# Patient Record
Sex: MALE | Race: WHITE | Employment: OTHER | URBAN - NONMETROPOLITAN AREA
[De-identification: names, ages, dates, MRNs, and addresses within clinical notes are randomized per-mention and may not be internally consistent; named-entity substitution may affect disease eponyms.]

---

## 2021-08-08 ENCOUNTER — HOSPITAL ENCOUNTER (OUTPATIENT)
Age: 81
Setting detail: OBSERVATION
Discharge: HOME | End: 2021-08-10
Attending: EMERGENCY MEDICINE | Admitting: INTERNAL MEDICINE
Payer: MEDICARE

## 2021-08-08 ENCOUNTER — APPOINTMENT (OUTPATIENT)
Dept: GENERAL RADIOLOGY | Age: 81
End: 2021-08-08
Payer: MEDICARE

## 2021-08-08 DIAGNOSIS — R07.9 CHEST PAIN, UNSPECIFIED TYPE: Primary | ICD-10-CM

## 2021-08-08 LAB
ALBUMIN SERPL-MCNC: 4.4 G/DL (ref 3.5–5.2)
ALP BLD-CCNC: 120 U/L (ref 40–130)
ALT SERPL-CCNC: 21 U/L (ref 5–41)
ANION GAP SERPL CALCULATED.3IONS-SCNC: 12 MMOL/L (ref 7–19)
APTT: 26.8 SEC (ref 26–36.2)
AST SERPL-CCNC: 21 U/L (ref 5–40)
BASOPHILS ABSOLUTE: 0 K/UL (ref 0–0.2)
BASOPHILS RELATIVE PERCENT: 0.4 % (ref 0–1)
BILIRUB SERPL-MCNC: 0.5 MG/DL (ref 0.2–1.2)
BUN BLDV-MCNC: 17 MG/DL (ref 8–23)
CALCIUM SERPL-MCNC: 9.8 MG/DL (ref 8.8–10.2)
CHLORIDE BLD-SCNC: 101 MMOL/L (ref 98–111)
CO2: 27 MMOL/L (ref 22–29)
CREAT SERPL-MCNC: 1 MG/DL (ref 0.5–1.2)
D DIMER: <0.27 UG/ML FEU (ref 0–0.48)
EOSINOPHILS ABSOLUTE: 0.1 K/UL (ref 0–0.6)
EOSINOPHILS RELATIVE PERCENT: 1.1 % (ref 0–5)
GFR AFRICAN AMERICAN: >59
GFR NON-AFRICAN AMERICAN: >60
GLUCOSE BLD-MCNC: 120 MG/DL (ref 70–99)
GLUCOSE BLD-MCNC: 147 MG/DL (ref 74–109)
HCT VFR BLD CALC: 38.6 % (ref 42–52)
HEMOGLOBIN: 12.6 G/DL (ref 14–18)
IMMATURE GRANULOCYTES #: 0 K/UL
INR BLD: 1.12 (ref 0.88–1.18)
LYMPHOCYTES ABSOLUTE: 0.4 K/UL (ref 1.1–4.5)
LYMPHOCYTES RELATIVE PERCENT: 7.4 % (ref 20–40)
MCH RBC QN AUTO: 32.1 PG (ref 27–31)
MCHC RBC AUTO-ENTMCNC: 32.6 G/DL (ref 33–37)
MCV RBC AUTO: 98.2 FL (ref 80–94)
MONOCYTES ABSOLUTE: 0.5 K/UL (ref 0–0.9)
MONOCYTES RELATIVE PERCENT: 8.1 % (ref 0–10)
NEUTROPHILS ABSOLUTE: 4.6 K/UL (ref 1.5–7.5)
NEUTROPHILS RELATIVE PERCENT: 82.3 % (ref 50–65)
PDW BLD-RTO: 13.3 % (ref 11.5–14.5)
PERFORMED ON: ABNORMAL
PLATELET # BLD: 135 K/UL (ref 130–400)
PMV BLD AUTO: 10.5 FL (ref 9.4–12.4)
POTASSIUM SERPL-SCNC: 3.7 MMOL/L (ref 3.5–5)
PRO-BNP: 78 PG/ML (ref 0–1800)
PROTHROMBIN TIME: 14.6 SEC (ref 12–14.6)
RBC # BLD: 3.93 M/UL (ref 4.7–6.1)
SARS-COV-2, NAAT: NOT DETECTED
SODIUM BLD-SCNC: 140 MMOL/L (ref 136–145)
TOTAL PROTEIN: 6.7 G/DL (ref 6.6–8.7)
TROPONIN: <0.01 NG/ML (ref 0–0.03)
WBC # BLD: 5.6 K/UL (ref 4.8–10.8)

## 2021-08-08 PROCEDURE — 93005 ELECTROCARDIOGRAM TRACING: CPT | Performed by: EMERGENCY MEDICINE

## 2021-08-08 PROCEDURE — 6370000000 HC RX 637 (ALT 250 FOR IP): Performed by: EMERGENCY MEDICINE

## 2021-08-08 PROCEDURE — G0378 HOSPITAL OBSERVATION PER HR: HCPCS

## 2021-08-08 PROCEDURE — 80053 COMPREHEN METABOLIC PANEL: CPT

## 2021-08-08 PROCEDURE — 83880 ASSAY OF NATRIURETIC PEPTIDE: CPT

## 2021-08-08 PROCEDURE — 82947 ASSAY GLUCOSE BLOOD QUANT: CPT

## 2021-08-08 PROCEDURE — 87635 SARS-COV-2 COVID-19 AMP PRB: CPT

## 2021-08-08 PROCEDURE — 85379 FIBRIN DEGRADATION QUANT: CPT

## 2021-08-08 PROCEDURE — 85610 PROTHROMBIN TIME: CPT

## 2021-08-08 PROCEDURE — 84484 ASSAY OF TROPONIN QUANT: CPT

## 2021-08-08 PROCEDURE — 36415 COLL VENOUS BLD VENIPUNCTURE: CPT

## 2021-08-08 PROCEDURE — 71045 X-RAY EXAM CHEST 1 VIEW: CPT

## 2021-08-08 PROCEDURE — 85730 THROMBOPLASTIN TIME PARTIAL: CPT

## 2021-08-08 PROCEDURE — 85025 COMPLETE CBC W/AUTO DIFF WBC: CPT

## 2021-08-08 PROCEDURE — 99283 EMERGENCY DEPT VISIT LOW MDM: CPT

## 2021-08-08 PROCEDURE — 2580000003 HC RX 258: Performed by: INTERNAL MEDICINE

## 2021-08-08 RX ORDER — ATORVASTATIN CALCIUM 80 MG/1
80 TABLET, FILM COATED ORAL NIGHTLY
Status: DISCONTINUED | OUTPATIENT
Start: 2021-08-08 | End: 2021-08-10 | Stop reason: HOSPADM

## 2021-08-08 RX ORDER — MAGNESIUM SULFATE IN WATER 40 MG/ML
2000 INJECTION, SOLUTION INTRAVENOUS PRN
Status: DISCONTINUED | OUTPATIENT
Start: 2021-08-08 | End: 2021-08-10 | Stop reason: HOSPADM

## 2021-08-08 RX ORDER — ASPIRIN 81 MG/1
81 TABLET ORAL DAILY
Status: DISCONTINUED | OUTPATIENT
Start: 2021-08-09 | End: 2021-08-10 | Stop reason: HOSPADM

## 2021-08-08 RX ORDER — SODIUM CHLORIDE 9 MG/ML
25 INJECTION, SOLUTION INTRAVENOUS PRN
Status: DISCONTINUED | OUTPATIENT
Start: 2021-08-08 | End: 2021-08-10 | Stop reason: HOSPADM

## 2021-08-08 RX ORDER — FUROSEMIDE 20 MG/1
20 TABLET ORAL DAILY
Status: DISCONTINUED | OUTPATIENT
Start: 2021-08-09 | End: 2021-08-10 | Stop reason: HOSPADM

## 2021-08-08 RX ORDER — MORPHINE SULFATE 4 MG/ML
2 INJECTION, SOLUTION INTRAMUSCULAR; INTRAVENOUS EVERY 4 HOURS PRN
Status: DISCONTINUED | OUTPATIENT
Start: 2021-08-08 | End: 2021-08-10 | Stop reason: HOSPADM

## 2021-08-08 RX ORDER — TAMSULOSIN HYDROCHLORIDE 0.4 MG/1
0.4 CAPSULE ORAL DAILY
Status: DISCONTINUED | OUTPATIENT
Start: 2021-08-08 | End: 2021-08-10 | Stop reason: HOSPADM

## 2021-08-08 RX ORDER — DEXTROSE MONOHYDRATE 25 G/50ML
12.5 INJECTION, SOLUTION INTRAVENOUS PRN
Status: DISCONTINUED | OUTPATIENT
Start: 2021-08-08 | End: 2021-08-10 | Stop reason: HOSPADM

## 2021-08-08 RX ORDER — POLYETHYLENE GLYCOL 3350 17 G/17G
17 POWDER, FOR SOLUTION ORAL DAILY PRN
Status: DISCONTINUED | OUTPATIENT
Start: 2021-08-08 | End: 2021-08-10 | Stop reason: HOSPADM

## 2021-08-08 RX ORDER — ACETAMINOPHEN 325 MG/1
650 TABLET ORAL EVERY 6 HOURS PRN
Status: DISCONTINUED | OUTPATIENT
Start: 2021-08-08 | End: 2021-08-10 | Stop reason: HOSPADM

## 2021-08-08 RX ORDER — LEVOTHYROXINE SODIUM 0.07 MG/1
75 TABLET ORAL DAILY
Status: DISCONTINUED | OUTPATIENT
Start: 2021-08-09 | End: 2021-08-10 | Stop reason: HOSPADM

## 2021-08-08 RX ORDER — AMIODARONE HYDROCHLORIDE 200 MG/1
200 TABLET ORAL DAILY
Status: DISCONTINUED | OUTPATIENT
Start: 2021-08-09 | End: 2021-08-10 | Stop reason: HOSPADM

## 2021-08-08 RX ORDER — SODIUM CHLORIDE 0.9 % (FLUSH) 0.9 %
5-40 SYRINGE (ML) INJECTION EVERY 12 HOURS SCHEDULED
Status: DISCONTINUED | OUTPATIENT
Start: 2021-08-08 | End: 2021-08-10 | Stop reason: HOSPADM

## 2021-08-08 RX ORDER — HEPARIN SODIUM 1000 [USP'U]/ML
40 INJECTION, SOLUTION INTRAVENOUS; SUBCUTANEOUS PRN
Status: DISCONTINUED | OUTPATIENT
Start: 2021-08-08 | End: 2021-08-08

## 2021-08-08 RX ORDER — ASPIRIN 325 MG
325 TABLET ORAL ONCE
Status: COMPLETED | OUTPATIENT
Start: 2021-08-08 | End: 2021-08-08

## 2021-08-08 RX ORDER — HEPARIN SODIUM 1000 [USP'U]/ML
80 INJECTION, SOLUTION INTRAVENOUS; SUBCUTANEOUS ONCE
Status: DISCONTINUED | OUTPATIENT
Start: 2021-08-08 | End: 2021-08-08

## 2021-08-08 RX ORDER — ACETAMINOPHEN 650 MG/1
650 SUPPOSITORY RECTAL EVERY 6 HOURS PRN
Status: DISCONTINUED | OUTPATIENT
Start: 2021-08-08 | End: 2021-08-10 | Stop reason: HOSPADM

## 2021-08-08 RX ORDER — POTASSIUM CHLORIDE 7.45 MG/ML
10 INJECTION INTRAVENOUS PRN
Status: DISCONTINUED | OUTPATIENT
Start: 2021-08-08 | End: 2021-08-10 | Stop reason: HOSPADM

## 2021-08-08 RX ORDER — HYDROCHLOROTHIAZIDE 25 MG/1
12.5 TABLET ORAL DAILY
Status: DISCONTINUED | OUTPATIENT
Start: 2021-08-09 | End: 2021-08-10 | Stop reason: HOSPADM

## 2021-08-08 RX ORDER — SODIUM CHLORIDE 0.9 % (FLUSH) 0.9 %
5-40 SYRINGE (ML) INJECTION PRN
Status: DISCONTINUED | OUTPATIENT
Start: 2021-08-08 | End: 2021-08-10 | Stop reason: HOSPADM

## 2021-08-08 RX ORDER — POTASSIUM CHLORIDE 20 MEQ/1
40 TABLET, EXTENDED RELEASE ORAL PRN
Status: DISCONTINUED | OUTPATIENT
Start: 2021-08-08 | End: 2021-08-10 | Stop reason: HOSPADM

## 2021-08-08 RX ORDER — ONDANSETRON 4 MG/1
4 TABLET, ORALLY DISINTEGRATING ORAL EVERY 8 HOURS PRN
Status: DISCONTINUED | OUTPATIENT
Start: 2021-08-08 | End: 2021-08-10 | Stop reason: HOSPADM

## 2021-08-08 RX ORDER — HEPARIN SODIUM 1000 [USP'U]/ML
80 INJECTION, SOLUTION INTRAVENOUS; SUBCUTANEOUS PRN
Status: DISCONTINUED | OUTPATIENT
Start: 2021-08-08 | End: 2021-08-08

## 2021-08-08 RX ORDER — HEPARIN SODIUM 10000 [USP'U]/100ML
5-30 INJECTION, SOLUTION INTRAVENOUS CONTINUOUS
Status: DISCONTINUED | OUTPATIENT
Start: 2021-08-08 | End: 2021-08-08

## 2021-08-08 RX ORDER — AMLODIPINE BESYLATE 10 MG/1
10 TABLET ORAL DAILY
Status: DISCONTINUED | OUTPATIENT
Start: 2021-08-09 | End: 2021-08-10 | Stop reason: HOSPADM

## 2021-08-08 RX ORDER — TERBINAFINE HYDROCHLORIDE 250 MG/1
250 TABLET ORAL DAILY
Status: DISCONTINUED | OUTPATIENT
Start: 2021-08-08 | End: 2021-08-10 | Stop reason: HOSPADM

## 2021-08-08 RX ORDER — NICOTINE POLACRILEX 4 MG
15 LOZENGE BUCCAL PRN
Status: DISCONTINUED | OUTPATIENT
Start: 2021-08-08 | End: 2021-08-10 | Stop reason: HOSPADM

## 2021-08-08 RX ORDER — LOSARTAN POTASSIUM 100 MG/1
100 TABLET ORAL DAILY
Status: DISCONTINUED | OUTPATIENT
Start: 2021-08-09 | End: 2021-08-10 | Stop reason: HOSPADM

## 2021-08-08 RX ORDER — ONDANSETRON 2 MG/ML
4 INJECTION INTRAMUSCULAR; INTRAVENOUS EVERY 6 HOURS PRN
Status: DISCONTINUED | OUTPATIENT
Start: 2021-08-08 | End: 2021-08-10 | Stop reason: HOSPADM

## 2021-08-08 RX ORDER — NITROGLYCERIN 0.4 MG/1
0.4 TABLET SUBLINGUAL EVERY 5 MIN PRN
Status: DISCONTINUED | OUTPATIENT
Start: 2021-08-08 | End: 2021-08-10 | Stop reason: HOSPADM

## 2021-08-08 RX ORDER — DEXTROSE MONOHYDRATE 50 MG/ML
100 INJECTION, SOLUTION INTRAVENOUS PRN
Status: DISCONTINUED | OUTPATIENT
Start: 2021-08-08 | End: 2021-08-10 | Stop reason: HOSPADM

## 2021-08-08 RX ORDER — FLUTICASONE PROPIONATE 50 MCG
2 SPRAY, SUSPENSION (ML) NASAL 2 TIMES DAILY PRN
Status: DISCONTINUED | OUTPATIENT
Start: 2021-08-08 | End: 2021-08-10 | Stop reason: HOSPADM

## 2021-08-08 RX ADMIN — ASPIRIN 325 MG: 325 TABLET ORAL at 13:53

## 2021-08-08 RX ADMIN — SODIUM CHLORIDE, PRESERVATIVE FREE 10 ML: 5 INJECTION INTRAVENOUS at 20:41

## 2021-08-08 ASSESSMENT — ENCOUNTER SYMPTOMS
BACK PAIN: 0
SORE THROAT: 0
VOMITING: 0
SHORTNESS OF BREATH: 0
NAUSEA: 0
DIARRHEA: 0
RHINORRHEA: 0
ABDOMINAL PAIN: 0

## 2021-08-08 ASSESSMENT — HEART SCORE: ECG: 0

## 2021-08-08 NOTE — ED NOTES
Bed: 06  Expected date:   Expected time:   Means of arrival:   Comments:  [id number] Erika Carrera RN  08/08/21 [id number]

## 2021-08-08 NOTE — ED TRIAGE NOTES
Patient was in [employer], said he stood up and had to sit back down because of chest pain.   \"I drank me a diet soda on the way over and the pain is gone\"

## 2021-08-08 NOTE — ED PROVIDER NOTES
[facility]  eMERGENCY dEPARTMENT eNCOUnter      Pt Name: Phoebe Jessica  MRN: [id number]  [place] 1940  Date of evaluation: 8/8/2021  Provider: Isamar Melvin MD    [address] [place] [address]       Chief Complaint   Patient presents with    Chest Pain         HISTORY OF PRESENT ILLNESS   (Location/Symptom, Timing/Onset,Context/Setting, Quality, Duration, Modifying Factors, Severity)  Note limiting factors. Phoebe Jessica is a 80 y.o. male who presents to the emergency department with a \"hard\" pain on the right and center part of his chest.  It lasted about an hour. It resolved on the way here. Patient has a history of hypertension hyperlipidemia and diabetes. He has a history of atrial fibrillation. He is on Eliquis. His prior cardiac management was in [place]. He just moved here recently. He said he had a stress test 3 months ago and believes it was negative. He had a recent cardioversion for atrial fibrillation. Currently chest pain-free. No history of blood clots. No calf pain or leg swelling. HPI    NursingNotes were reviewed. REVIEW OF SYSTEMS    (2-9 systems for level 4, 10 or more for level 5)     Review of Systems   Constitutional: Negative for chills and fever. HENT: Negative for rhinorrhea and sore throat. Respiratory: Negative for shortness of breath. Cardiovascular: Positive for chest pain. Negative for leg swelling. Gastrointestinal: Negative for abdominal pain, diarrhea, nausea and vomiting. Genitourinary: Negative for difficulty urinating. Musculoskeletal: Negative for back pain and neck pain. Skin: Negative for rash. Neurological: Negative for weakness and headaches. Psychiatric/Behavioral: Negative for confusion. A complete review of systems was performed and is negative except as noted above in the HPI.        PAST MEDICAL HISTORY     Past Medical History:   Diagnosis Date    Agent orange exposure     [facility]     [facility]     Hyperlipidemia     Hypertension     Non [facility]'s [facility]     Non-Hodgkin lymphoma ([facility]          SURGICAL HISTORY       Past Surgical History:   Procedure Laterality Date    ANKLE SURGERY      CARDIAC CATHETERIZATION      JOINT REPLACEMENT      SHOULDER ARTHROPLASTY           CURRENT MEDICATIONS       There are no discharge medications for this patient. ALLERGIES     Patient has no known allergies. FAMILY HISTORY     History reviewed. No pertinent family history. SOCIAL HISTORY       Social History     Socioeconomic History    Marital status:      Spouse name: None    Number of children: None    Years of education: None    Highest education level: None   Occupational History    None   Tobacco Use    Smoking status: Former Smoker     Quit date: 1976     Years since quittin.4    Smokeless tobacco: Never Used   Vaping Use    Vaping Use: Never used   Substance and Sexual Activity    Alcohol use: Not Currently    Drug use: Never    Sexual activity: None   Other Topics Concern    None   Social History Narrative    None     Social Determinants of Health     Financial Resource Strain:     Difficulty of Paying Living Expenses:    Food Insecurity:     Worried About Running Out of Food in the Last Year:     [address] in the Last Year:    Transportation Needs:     Lack of Transportation (Medical):      Lack of Transportation (Non-Medical):    Physical Activity:     Days of Exercise per Week:     Minutes of Exercise per Session:    Stress:     Feeling of Stress :    Social Connections:     Frequency of Communication with Friends and Family:     Frequency of Social Gatherings with Friends and Family:     Attends Religion Services:     Active Member of Clubs or Organizations:     Attends Club or Organization Meetings:     Marital Status:    Intimate Partner Violence:     Fear of Current or Ex-Partner:     Emotionally Abused:     Physically Abused:     Sexually Abused:        SCREENINGS    [place] Coma Scale  Eye Opening: Spontaneous  Best Verbal Response: Oriented  Best Motor Response: Obeys commands  [place] Coma Scale Score: 15 Heart Score for chest pain patients  History: Slightly Suspicious  ECG: Normal  Patient Age: > 65 years  *Risk factors for Atherosclerotic disease: Diabetes Mellitus, Hypercholesterolemia, Hypertension  Risk Factors: > 3 Risk factors or history of atherosclerotic disease*  Troponin: < 1X normal limit  Heart Score Total: 4      PHYSICAL EXAM    (up to 7 for level 4, 8 or more for level 5)     ED Triage Vitals [08/08/21 1222]   BP Temp Temp Source Pulse Resp SpO2 Height Weight   (!) 152/60 98.1 °F (36.7 °C) Temporal 53 18 -- 6' 1\" (1.854 m) 200 lb (90.7 kg)       Physical Exam  Vitals and nursing note reviewed. Constitutional:       General: He is not in acute distress. Appearance: He is well-developed. He is not diaphoretic. HENT:      Head: Normocephalic and atraumatic. Eyes:      Pupils: Pupils are equal, round, and reactive to light. Cardiovascular:      Rate and Rhythm: Normal rate and regular rhythm. Heart sounds: Normal heart sounds. Pulmonary:      Effort: Pulmonary effort is normal. No respiratory distress. Breath sounds: Normal breath sounds. Abdominal:      General: Bowel sounds are normal. There is no distension. Palpations: Abdomen is soft. Tenderness: There is no abdominal tenderness. Musculoskeletal:         General: Normal range of motion. Cervical back: Normal range of motion and neck supple. Skin:     General: Skin is warm and dry. Findings: No rash. Neurological:      Mental Status: He is alert and oriented to person, place, and time. Cranial Nerves: No cranial nerve deficit. Motor: No abnormal muscle tone. Coordination: Coordination normal.   Psychiatric:         Behavior: Behavior normal.         DIAGNOSTIC RESULTS     EKG:  All EKG's are interpreted by the Emergency Department Physician who either signs or Co-signs this chart in the absence of a cardiologist.    Normal sinus rhythm rate 50 nonspecific ST waves  Sinus rhythm rate 52 nonspecific ST waves    RADIOLOGY:   Non-plain film images such as CT, Ultrasound and MRI are read by the radiologist. Plainradiographic images are visualized and preliminarily interpreted by the emergency physician with the below findings:        Interpretation per the Radiologist below, if available at the time of this note:    XR CHEST PORTABLE   Final Result   1. No radiographic evidence of acute cardiopulmonary process. Signed by Dr Seble Mcknight            ED BEDSIDE ULTRASOUND:   Performed by ED Physician - none    LABS:  Labs Reviewed   CBC WITH AUTO DIFFERENTIAL - Abnormal; Notable for the following components:       Result Value    RBC 3.93 (*)     Hemoglobin 12.6 (*)     Hematocrit 38.6 (*)     MCV 98.2 (*)     MCH 32.1 (*)     MCHC 32.6 (*)     Neutrophils % 82.3 (*)     Lymphocytes % 7.4 (*)     Lymphocytes Absolute 0.4 (*)     All other components within normal limits   COMPREHENSIVE METABOLIC PANEL - Abnormal; Notable for the following components:    Glucose 147 (*)     All other components within normal limits   COVID-19, RAPID   APTT   BRAIN NATRIURETIC PEPTIDE   D-DIMER, QUANTITATIVE   PROTIME-INR   TROPONIN   TROPONIN   TROPONIN   TROPONIN   CBC WITH AUTO DIFFERENTIAL   COMPREHENSIVE METABOLIC PANEL   MAGNESIUM   HEMOGLOBIN A1C   LIPID PANEL       All other labs were within normal range or not returned as of this dictation.     EMERGENCY DEPARTMENT COURSE and DIFFERENTIALDIAGNOSIS/MDM:   Vitals:    Vitals:    08/08/21 1222 08/08/21 1345 08/08/21 1543 08/08/21 1632   BP: (!) 152/60  (!) 167/61 (!) 159/61   Pulse: 53  53 54   Resp: 18  19 16   Temp: 98.1 °F (36.7 °C)      TempSrc: Temporal      SpO2:  97% 96% 94%   Weight: 200 lb (90.7 kg)      Height: 6' 1\" (1.854 m)          MDM  No access to prior stress test. Heart score 4. Pt does not want to go home    CONSULTS:  [address]:  Unless otherwise notedbelow, none     Procedures    FINAL IMPRESSION     1. Chest pain, unspecified type          DISPOSITION/PLAN   DISPOSITION        PATIENT REFERRED TO:  @FUP@    DISCHARGE MEDICATIONS:  There are no discharge medications for this patient.          (Please note that portions of this note were completed with a voice recognition program.  Efforts were made to edit the dictations butoccasionally words are mis-transcribed.)    Cierra Owens MD (electronically signed)  AttendingEmergency Physician         Cierra Owens MD  08/08/21 3408

## 2021-08-08 NOTE — H&P
Priya Trujillo - History & Physical    06/06  PCP: No primary care provider on file. Date of Admission: 8/8/2021   Date of Service: Pt seen/examined on8/8/2021 and Placed in Observation. Chief Complaint: Chest pain    History Of Present Illness: The patient is a 80 y.o. male with a past medical history of atrial fibrillation on amiodarone/Eliquis, hypertension, hypothyroidism, BPH, diabetes, hyperlipidemia, non-Hodgkin's lymphoma, chronic sinusitis who presented to the ED complaining of midsternal pressure-like constant nonradiating chest pain not associated with dyspnea while he was at [employer] this morning that went away while he was on his way to the hospital.  Patient appears comfortable during my evaluation is currently eating a sandwich. Denies nausea, vomiting, diarrhea, constipation, fevers, chills, sweats, dysuria, cough, sick contacts. He is recently moved from [place] to this area. Past Medical History:        Diagnosis Date    Agent orange exposure     Atrial fibrillation ([facility]     Diabetes mellitus ([facility]     Hyperlipidemia     Hypertension     Non Hodgkin's lymphoma ([facility]        Past Surgical History:        Procedure Laterality Date    ANKLE SURGERY      CARDIAC CATHETERIZATION      JOINT REPLACEMENT      SHOULDER ARTHROPLASTY         Home Medications:  Tamsulosin  Hydrochlorothiazide  Synthroid  Terbinafine  Olmesartan  Amlodipine  Lipitor  Amiodarone  Lasix  Eliquis  Fluticasone  Glipizide    Allergies:    Patient has no known allergies. Social History:    Tobacco:   reports that he has quit smoking. He has never used smokeless tobacco.  Alcohol:   reports previous alcohol use. Illicit Drugs: denies    Family History:  History reviewed. No pertinent family history. Review of Systems:   Negative except as noted above.         Physical Examination:  BP (!) 159/61   Pulse 54   Temp 98.1 °F (36.7 °C) (Temporal)   Resp 16   Ht 6' 1\" (1.854 m) Wt 200 lb (90.7 kg)   SpO2 94%   BMI 26.39 kg/m²      General appearance: Alert  Head: NC/AT  Eyes: conjunctivae/corneas clear. Ears: normal external ears  Neck: supple  Lungs: BLAE, bibasilar crackles   Heart: regular rate and rhythm, no murmurs appreciated  Abdomen: BS+, soft, NT, ND  Extremities: no edema, pulses+  Skin: warm  Neurologic: Alert, gross motor function intact  Psychiatric:  calm        Diagnostic Data:     CBC:  Recent Labs     08/08/21  1325   WBC 5.6   HGB 12.6*   HCT 38.6*        BMP:  Recent Labs     08/08/21  1325      K 3.7      CO2 27   BUN 17   CREATININE 1.0   CALCIUM 9.8     Recent Labs     08/08/21  1325   AST 21   ALT 21   BILITOT 0.5   ALKPHOS 120     Coag Panel:   Recent Labs     08/08/21  1325   INR 1.12   PROTIME 14.6   APTT 26.8     Cardiac Enzymes:   Recent Labs     08/08/21  1325 08/08/21  1550   TROPONINI <0.01 <0.01     ABGs:No results found for: PHART, PO2ART, ZIU8YOQ  Urinalysis:No results found for: Justyn Roulette, BACTERIA, RBCUA, BLOODU, SPECGRAV, GLUCOSEU  RAD:     XR CHEST PORTABLE [[id number]] Resulted: 08/08/21 1410      Order Status: Completed Updated: 08/08/21 1412     Narrative:       EXAMINATION: XR CHEST PORTABLE 8/8/2021 3:09 PM   HISTORY: XR CHEST PORTABLE 8/8/2021 1:51 PM   HISTORY: Chest pain   COMPARISON: None. FINDINGS:   The lungs are clear. The cardiomediastinal silhouette and pulmonary   vascularity are within normal limits. Right shoulder prosthesis is present. Right central venous Port-A-Cath   is present. .     Impression:       1. No radiographic evidence of acute cardiopulmonary process.    Signed by Dr Korin Hartman Problems    Diagnosis Date Noted    Chest pain [R07.9] 08/08/2021    Agent orange exposure [Z77.098]     Atrial fibrillation (HCC) [I48.91]     Diabetes mellitus ([facility] [E11.9]     Hyperlipidemia [E78.5]     Hypertension [I10]     Non Hodgkin's lymphoma ([facility] [C85.90]     BPH (benign prostatic hyperplasia) [N40.0]     Hypothyroidism [E03.9]     Chronic sinusitis [J32.9]        MPRESSION / PLAN:  Principal Problem:    Chest pain  Active Problems:    Agent orange exposure    Atrial fibrillation (HCC)    Diabetes mellitus ([facility]    Hyperlipidemia    Hypertension    Non Hodgkin's lymphoma (HCC)    BPH (benign prostatic hyperplasia)    Hypothyroidism    Chronic sinusitis  Resolved Problems:    * No resolved hospital problems. *    Chest pain: Loaded with aspirin in the ED. Aspirin/statin. Serial troponin.  TTE. Telemetry. Cardiology evaluation. As needed pain management. Hypertension: Home medications resumed. Monitor BP and adjust medications as needed. Diabetes: HbA1c.  ISS. Monitor BG and adjust medications as needed. Hypothyroidism: Synthroid. Atrial fibrillation: RC/AC.    BPH: Tamsulosin. Non-Hodgkin's lymphoma: Requesting referral for outpatient hematology/oncology. Supportive management. DVT prophylaxis: Eliquis  Full code.       Srini Mena MD  8/8/2021

## 2021-08-09 ENCOUNTER — APPOINTMENT (OUTPATIENT)
Dept: NUCLEAR MEDICINE | Age: 81
End: 2021-08-09
Payer: MEDICARE

## 2021-08-09 LAB
ALBUMIN SERPL-MCNC: 4.2 G/DL (ref 3.5–5.2)
ALP BLD-CCNC: 111 U/L (ref 40–130)
ALT SERPL-CCNC: 18 U/L (ref 5–41)
ANION GAP SERPL CALCULATED.3IONS-SCNC: 10 MMOL/L (ref 7–19)
AST SERPL-CCNC: 16 U/L (ref 5–40)
BASOPHILS ABSOLUTE: 0 K/UL (ref 0–0.2)
BASOPHILS RELATIVE PERCENT: 0.7 % (ref 0–1)
BILIRUB SERPL-MCNC: 0.3 MG/DL (ref 0.2–1.2)
BUN BLDV-MCNC: 16 MG/DL (ref 8–23)
CALCIUM SERPL-MCNC: 10.1 MG/DL (ref 8.8–10.2)
CHLORIDE BLD-SCNC: 103 MMOL/L (ref 98–111)
CHOLESTEROL, TOTAL: 129 MG/DL (ref 160–199)
CO2: 30 MMOL/L (ref 22–29)
CREAT SERPL-MCNC: 1.1 MG/DL (ref 0.5–1.2)
EKG P AXIS: -3 DEGREES
EKG P AXIS: 43 DEGREES
EKG P-R INTERVAL: 208 MS
EKG P-R INTERVAL: 230 MS
EKG Q-T INTERVAL: 480 MS
EKG Q-T INTERVAL: 526 MS
EKG QRS DURATION: 102 MS
EKG QRS DURATION: 108 MS
EKG QTC CALCULATION (BAZETT): 466 MS
EKG QTC CALCULATION (BAZETT): 509 MS
EKG T AXIS: 29 DEGREES
EKG T AXIS: 37 DEGREES
EOSINOPHILS ABSOLUTE: 0.2 K/UL (ref 0–0.6)
EOSINOPHILS RELATIVE PERCENT: 3.4 % (ref 0–5)
GFR AFRICAN AMERICAN: >59
GFR NON-AFRICAN AMERICAN: >60
GLUCOSE BLD-MCNC: 105 MG/DL (ref 74–109)
GLUCOSE BLD-MCNC: 107 MG/DL (ref 70–99)
GLUCOSE BLD-MCNC: 116 MG/DL (ref 70–99)
GLUCOSE BLD-MCNC: 189 MG/DL (ref 70–99)
GLUCOSE BLD-MCNC: 201 MG/DL (ref 70–99)
HBA1C MFR BLD: 5.5 % (ref 4–6)
HCT VFR BLD CALC: 33.9 % (ref 42–52)
HDLC SERPL-MCNC: 48 MG/DL (ref 55–121)
HEMOGLOBIN: 11.5 G/DL (ref 14–18)
IMMATURE GRANULOCYTES #: 0.1 K/UL
LDL CHOLESTEROL CALCULATED: 64 MG/DL
LV EF: 54 %
LV EF: 60 %
LVEF MODALITY: NORMAL
LVEF MODALITY: NORMAL
LYMPHOCYTES ABSOLUTE: 0.8 K/UL (ref 1.1–4.5)
LYMPHOCYTES RELATIVE PERCENT: 17.8 % (ref 20–40)
MAGNESIUM: 2.3 MG/DL (ref 1.6–2.4)
MCH RBC QN AUTO: 32.4 PG (ref 27–31)
MCHC RBC AUTO-ENTMCNC: 33.9 G/DL (ref 33–37)
MCV RBC AUTO: 95.5 FL (ref 80–94)
MONOCYTES ABSOLUTE: 0.5 K/UL (ref 0–0.9)
MONOCYTES RELATIVE PERCENT: 11 % (ref 0–10)
NEUTROPHILS ABSOLUTE: 2.9 K/UL (ref 1.5–7.5)
NEUTROPHILS RELATIVE PERCENT: 65 % (ref 50–65)
PDW BLD-RTO: 13.2 % (ref 11.5–14.5)
PERFORMED ON: ABNORMAL
PLATELET # BLD: 119 K/UL (ref 130–400)
PMV BLD AUTO: 10.4 FL (ref 9.4–12.4)
POTASSIUM SERPL-SCNC: 3.7 MMOL/L (ref 3.5–5)
RBC # BLD: 3.55 M/UL (ref 4.7–6.1)
SODIUM BLD-SCNC: 143 MMOL/L (ref 136–145)
TOTAL PROTEIN: 5.7 G/DL (ref 6.6–8.7)
TRIGL SERPL-MCNC: 84 MG/DL (ref 0–149)
TROPONIN: <0.01 NG/ML (ref 0–0.03)
WBC # BLD: 4.4 K/UL (ref 4.8–10.8)

## 2021-08-09 PROCEDURE — 6370000000 HC RX 637 (ALT 250 FOR IP): Performed by: INTERNAL MEDICINE

## 2021-08-09 PROCEDURE — 6360000002 HC RX W HCPCS: Performed by: INTERNAL MEDICINE

## 2021-08-09 PROCEDURE — 80053 COMPREHEN METABOLIC PANEL: CPT

## 2021-08-09 PROCEDURE — 85025 COMPLETE CBC W/AUTO DIFF WBC: CPT

## 2021-08-09 PROCEDURE — 82947 ASSAY GLUCOSE BLOOD QUANT: CPT

## 2021-08-09 PROCEDURE — 36415 COLL VENOUS BLD VENIPUNCTURE: CPT

## 2021-08-09 PROCEDURE — 83735 ASSAY OF MAGNESIUM: CPT

## 2021-08-09 PROCEDURE — 93306 TTE W/DOPPLER COMPLETE: CPT

## 2021-08-09 PROCEDURE — 99204 OFFICE O/P NEW MOD 45 MIN: CPT | Performed by: INTERNAL MEDICINE

## 2021-08-09 PROCEDURE — G0378 HOSPITAL OBSERVATION PER HR: HCPCS

## 2021-08-09 PROCEDURE — A9500 TC99M SESTAMIBI: HCPCS | Performed by: INTERNAL MEDICINE

## 2021-08-09 PROCEDURE — 84484 ASSAY OF TROPONIN QUANT: CPT

## 2021-08-09 PROCEDURE — 93017 CV STRESS TEST TRACING ONLY: CPT

## 2021-08-09 PROCEDURE — 80061 LIPID PANEL: CPT

## 2021-08-09 PROCEDURE — 83036 HEMOGLOBIN GLYCOSYLATED A1C: CPT

## 2021-08-09 PROCEDURE — 2580000003 HC RX 258: Performed by: INTERNAL MEDICINE

## 2021-08-09 PROCEDURE — 3430000000 HC RX DIAGNOSTIC RADIOPHARMACEUTICAL: Performed by: INTERNAL MEDICINE

## 2021-08-09 RX ORDER — ATORVASTATIN CALCIUM 80 MG/1
80 TABLET, FILM COATED ORAL NIGHTLY
COMMUNITY
End: 2022-02-16 | Stop reason: SDUPTHER

## 2021-08-09 RX ORDER — TERBINAFINE HYDROCHLORIDE 250 MG/1
250 TABLET ORAL DAILY
COMMUNITY

## 2021-08-09 RX ORDER — FLUTICASONE PROPIONATE 50 MCG
1 SPRAY, SUSPENSION (ML) NASAL DAILY PRN
COMMUNITY

## 2021-08-09 RX ORDER — HYDROCHLOROTHIAZIDE 12.5 MG/1
12.5 TABLET ORAL DAILY
COMMUNITY

## 2021-08-09 RX ORDER — AMLODIPINE BESYLATE 10 MG/1
10 TABLET ORAL DAILY
COMMUNITY
End: 2022-02-16 | Stop reason: SDUPTHER

## 2021-08-09 RX ORDER — MULTIVIT-MIN/IRON/FOLIC ACID/K 18-600-40
2 CAPSULE ORAL NIGHTLY
COMMUNITY

## 2021-08-09 RX ORDER — GLIPIZIDE 2.5 MG/1
2.5 TABLET, EXTENDED RELEASE ORAL DAILY
COMMUNITY
End: 2021-12-27

## 2021-08-09 RX ORDER — LEVOTHYROXINE SODIUM 0.07 MG/1
75 TABLET ORAL DAILY
COMMUNITY
End: 2021-12-27

## 2021-08-09 RX ORDER — MULTIVIT-MIN/IRON/FOLIC ACID/K 18-600-40
CAPSULE ORAL NIGHTLY
COMMUNITY

## 2021-08-09 RX ORDER — ASCORBIC ACID 500 MG
2000 TABLET ORAL DAILY
COMMUNITY

## 2021-08-09 RX ORDER — AMIODARONE HYDROCHLORIDE 200 MG/1
200 TABLET ORAL DAILY
COMMUNITY
End: 2022-02-16 | Stop reason: SDUPTHER

## 2021-08-09 RX ORDER — TAMSULOSIN HYDROCHLORIDE 0.4 MG/1
0.4 CAPSULE ORAL NIGHTLY
COMMUNITY

## 2021-08-09 RX ORDER — FUROSEMIDE 20 MG/1
20 TABLET ORAL DAILY
COMMUNITY
End: 2021-12-27

## 2021-08-09 RX ORDER — OLMESARTAN MEDOXOMIL 40 MG/1
40 TABLET ORAL DAILY
COMMUNITY
End: 2022-02-16 | Stop reason: SDUPTHER

## 2021-08-09 RX ADMIN — SODIUM CHLORIDE, PRESERVATIVE FREE 10 ML: 5 INJECTION INTRAVENOUS at 21:48

## 2021-08-09 RX ADMIN — FUROSEMIDE 20 MG: 20 TABLET ORAL at 11:02

## 2021-08-09 RX ADMIN — TAMSULOSIN HYDROCHLORIDE 0.4 MG: 0.4 CAPSULE ORAL at 11:03

## 2021-08-09 RX ADMIN — TETRAKIS(2-METHOXYISOBUTYLISOCYANIDE)COPPER(I) TETRAFLUOROBORATE 30 MILLICURIE: 1 INJECTION, POWDER, LYOPHILIZED, FOR SOLUTION INTRAVENOUS at 15:14

## 2021-08-09 RX ADMIN — TERBINAFINE 250 MG: 250 TABLET ORAL at 11:02

## 2021-08-09 RX ADMIN — AMLODIPINE BESYLATE 10 MG: 10 TABLET ORAL at 11:01

## 2021-08-09 RX ADMIN — TETRAKIS(2-METHOXYISOBUTYLISOCYANIDE)COPPER(I) TETRAFLUOROBORATE 10 MILLICURIE: 1 INJECTION, POWDER, LYOPHILIZED, FOR SOLUTION INTRAVENOUS at 15:14

## 2021-08-09 RX ADMIN — ASPIRIN 81 MG: 81 TABLET, COATED ORAL at 11:02

## 2021-08-09 RX ADMIN — APIXABAN 5 MG: 5 TABLET, FILM COATED ORAL at 21:48

## 2021-08-09 RX ADMIN — ATORVASTATIN CALCIUM 80 MG: 80 TABLET, FILM COATED ORAL at 21:48

## 2021-08-09 RX ADMIN — REGADENOSON 0.4 MG: 0.08 INJECTION, SOLUTION INTRAVENOUS at 15:10

## 2021-08-09 RX ADMIN — APIXABAN 5 MG: 5 TABLET, FILM COATED ORAL at 11:03

## 2021-08-09 RX ADMIN — LEVOTHYROXINE SODIUM 75 MCG: 75 TABLET ORAL at 11:01

## 2021-08-09 RX ADMIN — HYDROCHLOROTHIAZIDE 12.5 MG: 25 TABLET ORAL at 11:02

## 2021-08-09 RX ADMIN — LOSARTAN POTASSIUM 100 MG: 100 TABLET, FILM COATED ORAL at 11:01

## 2021-08-09 RX ADMIN — SODIUM CHLORIDE, PRESERVATIVE FREE 10 ML: 5 INJECTION INTRAVENOUS at 11:04

## 2021-08-09 RX ADMIN — AMIODARONE HYDROCHLORIDE 200 MG: 200 TABLET ORAL at 11:02

## 2021-08-09 ASSESSMENT — PAIN SCALES - GENERAL: PAINLEVEL_OUTOF10: 0

## 2021-08-09 ASSESSMENT — ENCOUNTER SYMPTOMS
GASTROINTESTINAL NEGATIVE: 1
RESPIRATORY NEGATIVE: 1

## 2021-08-09 NOTE — PROGRESS NOTES
[facility]        Hospitalist Progress Note  8/9/2021 5:02 PM  Subjective:   Admit Date: 8/8/2021  PCP: No primary care provider on file. Chief Complaint: Chest pain    Subjective: Patient seen and examined status post Lexiscan stress test.  Denies current chest pain or shortness of breath. Appears well. [facility] History: 80 y.o. male with a past medical history of atrial fibrillation on amiodarone/Eliquis, hypertension, hypothyroidism, BPH, diabetes, hyperlipidemia, non-Hodgkin's lymphoma, chronic sinusitis who presented to the ED complaining of chest pain. Cardiology consulted admission. ROS: 14 point review of systems is negative except as specifically addressed above. ADULT DIET;  Regular; 4 carb choices (60 gm/meal)    Intake/Output Summary (Last 24 hours) at 8/9/2021 1702  Last data filed at 8/9/2021 1101  Gross per 24 hour   Intake 250 ml   Output --   Net 250 ml     Medications:   sodium chloride      dextrose       Current Facility-Administered Medications   Medication Dose Route Frequency Provider Last Rate Last Admin    aspirin EC tablet 81 mg  81 mg Oral Daily Derinda Leyden, MD   81 mg at 08/09/21 1102    sodium chloride flush 0.9 % injection 5-40 mL  5-40 mL Intravenous 2 times per day Derinda Leyden, MD   10 mL at 08/09/21 1104    sodium chloride flush 0.9 % injection 5-40 mL  5-40 mL Intravenous PRN Derinda Leyden, MD        0.9 % sodium chloride infusion  25 mL Intravenous PRN Derinda Leyden, MD        ondansetron (ZOFRAN-ODT) disintegrating tablet 4 mg  4 mg Oral Q8H PRN Derinda Leyden, MD        Or    ondansetron [facility]) injection 4 mg  4 mg Intravenous Q6H PRN Derinda Leyden, MD        polyethylene glycol [facility]) packet 17 g  17 g Oral Daily PRN Derinda Leyden, MD        acetaminophen (TYLENOL) tablet 650 mg  650 mg Oral Q6H PRN Derinda Leyden, MD        Or    acetaminophen (TYLENOL) suppository 650 mg  650 mg Rectal Q6H PRN Derinda Leyden, MD        potassium chloride (KLOR-CON M) extended release tablet 40 mEq  40 mEq Oral PRN Janet Small MD        Or    potassium bicarb-citric acid (EFFER-K) effervescent tablet 40 mEq  40 mEq Oral PRN Janet Small MD        Or    potassium chloride 10 mEq/100 mL IVPB (Peripheral Line)  10 mEq Intravenous PRN Janet Small MD        magnesium sulfate 2000 mg in 50 mL IVPB premix  2,000 mg Intravenous PRN Janet Small MD        morphine injection 2 mg  2 mg Intravenous Q4H PRN Janet Small MD        nitroGLYCERIN (NITROSTAT) SL tablet 0.4 mg  0.4 mg Sublingual Q5 Min PRGUILLERMO Small MD        tamsulosin [facility]) capsule 0.4 mg  0.4 mg Oral Daily Janet Small MD   0.4 mg at 08/09/21 1103    levothyroxine (SYNTHROID) tablet 75 mcg  75 mcg Oral Daily Janet Small MD   75 mcg at 08/09/21 1101    terbinafine (LAMISIL) tablet 250 mg  250 mg Oral Daily Janet Small MD   250 mg at 08/09/21 1102    insulin lispro (HUMALOG) injection vial 0-6 Units  0-6 Units Subcutaneous TID WC Janet Small MD        insulin lispro (HUMALOG) injection vial 0-3 Units  0-3 Units Subcutaneous Nightly Janet Small MD        glucose (GLUTOSE) 40 % oral gel 15 g  15 g Oral PRN Janet Small MD        dextrose 50 % IV solution  12.5 g Intravenous PRN Janet Small MD        glucagon (rDNA) injection 1 mg  1 mg Intramuscular PRN Janet Small MD        dextrose 5 % solution  100 mL/hr Intravenous PRN Janet Small MD        hydroCHLOROthiazide (HYDRODIURIL) tablet 12.5 mg  12.5 mg Oral Daily Janet Small MD   12.5 mg at 08/09/21 1102    losartan (COZAAR) tablet 100 mg  100 mg Oral Daily Janet Small MD   100 mg at 08/09/21 1101    amLODIPine (NORVASC) tablet 10 mg  10 mg Oral Daily Janet Small MD   10 mg at 08/09/21 1101    atorvastatin (LIPITOR) tablet 80 mg  80 mg Oral Nightly Janet Small MD        amiodarone (CORDARONE) tablet 200 mg  200 mg Oral Daily Janet Small MD   200 mg at 08/09/21 1102    furosemide (LASIX) tablet 20 mg 20 mg Oral Daily Srini Mena MD   20 mg at 08/09/21 1102    apixaban (ELIQUIS) tablet 5 mg  5 mg Oral BID Srini Mena MD   5 mg at 08/09/21 1103    fluticasone (FLONASE) 50 MCG/ACT nasal spray 2 spray  2 spray Each Nostril BID PRN Srini Mena MD            Labs:     Recent Labs     08/08/21  1325 08/09/21  0004   WBC 5.6 4.4*   RBC 3.93* 3.55*   HGB 12.6* 11.5*   HCT 38.6* 33.9*   MCV 98.2* 95.5*   MCH 32.1* 32.4*   MCHC 32.6* 33.9    119*     Recent Labs     08/08/21  1325 08/09/21  0004    143   K 3.7 3.7   ANIONGAP 12 10    103   CO2 27 30*   BUN 17 16   CREATININE 1.0 1.1   GLUCOSE 147* 105   CALCIUM 9.8 10.1     Recent Labs     08/09/21  0004   MG 2.3     Recent Labs     08/08/21  1325 08/09/21  0004   AST 21 16   ALT 21 18   BILITOT 0.5 0.3   ALKPHOS 120 111     ABGs:No results for input(s): PH, PO2, PCO2, HCO3, BE, O2SAT in the last 72 hours. Troponin T:   Recent Labs     08/08/21  1550 08/08/21 2013 08/09/21  0005   TROPONINI <0.01 <0.01 <0.01     INR:   Recent Labs     08/08/21  1325   INR 1.12     Lactic Acid: No results for input(s): LACTA in the last 72 hours. Objective:   Vitals: BP (!) 113/56   Pulse 52   Temp 96.8 °F (36 °C) (Temporal)   Resp 18   Ht 6' 1\" (1.854 m)   Wt 200 lb (90.7 kg)   SpO2 97%   BMI 26.39 kg/m²   24HR INTAKE/OUTPUT:      Intake/Output Summary (Last 24 hours) at 8/9/2021 1702  Last data filed at 8/9/2021 1101  Gross per 24 hour   Intake 250 ml   Output --   Net 250 ml     General appearance: Alert  Head: NC/AT  Eyes: conjunctivae/corneas clear.    Ears: normal external ears  Neck: supple  Lungs: BLAE, no increased work of breathing  Heart: regular rate and rhythm, no murmurs appreciated  Abdomen: BS+, soft, NT, ND  Extremities: no edema, pulses+  Skin: warm  Neurologic: Alert, gross motor function intact  Psychiatric:  calm    Assessment and Plan:   Principal Problem:    Chest pain  Active Problems:    Agent orange exposure    Atrial fibrillation ([facility]    Diabetes mellitus ([facility]    Hyperlipidemia    Hypertension    Non Hodgkin's lymphoma ([facility]    BPH (benign prostatic hyperplasia)    Hypothyroidism    Chronic sinusitis  Resolved Problems:    * No resolved hospital problems. *    Chest pain: Denies current chest pain. Aspirin/statin. Serial troponin unremarkable. TTE pending. Telemetry. Cardiology on board. As needed pain management. Lexiscan stress test performed today.     Hypertension: Home medications resumed. Monitor BP and adjust medications as needed.     Diabetes: HbA1c 5.5.  ISS. Monitor BG and adjust medications as needed.     Hypothyroidism: Synthroid.     Atrial fibrillation: RC/AC.     BPH: Tamsulosin.     Non-Hodgkin's lymphoma: Requesting referral for outpatient hematology/oncology.     Supportive management.     Advance Directive: Full Code    DVT prophylaxis: Eliquis    Discharge planning: TBD      Signed:  Isaias Mendiola MD 8/9/2021 5:02 PM  Rounding Hospitalist

## 2021-08-09 NOTE — CONSULTS
[facility]) Cardiology   Consult Note   Sarah Caraballo       Requesting MD:  Lisette Tabares MD   Admit Status:  Observation [104]       History obtained from:   [x] Patient  [] Other (specify):     Patient:  Alejandra Shown  [id number]     Chief Complaint:   Chief Complaint   Patient presents with    Chest Pain        HPI: Mr. Jose Ann is a 80 y.o. male with a history of hypertension, hyperlipidemia, history of diabetes, atrial fibrillation on Eliquis, history of hypothyroidism, non-Hodgkin's lymphoma with previous chemotherapy via port, benign prostate hypertrophy    Patient was admitted to the hospital with chest pain which is located mostly in the center on the right side of the chest, patient was living in [place], and just moved here, he mentioned that he underwent recent cardioversion for atrial fibrillation    Review of Systems:  Review of Systems   Constitutional: Negative. Respiratory: Negative. Cardiovascular: Positive for chest pain. Gastrointestinal: Negative. Endocrine: Negative. Genitourinary: Negative. Musculoskeletal: Negative. Skin: Negative. Neurological: Negative. Hematological: Negative. All other systems reviewed and are negative. Cardiac Specific Data:  Specialty Problems        Vascular Problems    Atrial fibrillation ([address]        Diabetes mellitus ([address]        Hyperlipidemia        Hypertension              Past Medical History:  Past Medical History:   Diagnosis Date    Agent orange exposure     Atrial fibrillation ([address]     Diabetes mellitus ([address]     Hyperlipidemia     Hypertension     Non Hodgkin's lymphoma ([address]     Non-Hodgkin lymphoma ([address]         Past Surgical History:  Past Surgical History:   Procedure Laterality Date    ANKLE SURGERY      CARDIAC CATHETERIZATION      JOINT REPLACEMENT      SHOULDER ARTHROPLASTY         Past Family History:  History reviewed. No pertinent family history.     Past Social History:  Social History     Socioeconomic History    Marital status:      Spouse name: Not on file    Number of children: Not on file    Years of education: Not on file    Highest education level: Not on file   Occupational History    Not on file   Tobacco Use    Smoking status: Former Smoker     Quit date: 1976     Years since quittin.4    Smokeless tobacco: Never Used   Vaping Use    Vaping Use: Never used   Substance and Sexual Activity    Alcohol use: Not Currently    Drug use: Never    Sexual activity: Not on file   Other Topics Concern    Not on file   Social History Narrative    Not on file     Social Determinants of Health     Financial Resource Strain:     Difficulty of Paying Living Expenses:    Food Insecurity:     Worried About Running Out of Food in the Last Year:     [address] in the Last Year:    Transportation Needs:     Lack of Transportation (Medical):  Lack of Transportation (Non-Medical):    Physical Activity:     Days of Exercise per Week:     Minutes of Exercise per Session:    Stress:     Feeling of Stress :    Social Connections:     Frequency of Communication with Friends and Family:     Frequency of Social Gatherings with Friends and Family:     Attends [employer] Services:     Active Member of Clubs or Organizations:     Attends Club or Organization Meetings:     Marital Status:    Intimate Partner Violence:     Fear of Current or Ex-Partner:     Emotionally Abused:     Physically Abused:     Sexually Abused: Allergies:  No Known Allergies    Home Meds:  Prior to Admission medications    Medication Sig Start Date End Date Taking?  Authorizing Provider   levothyroxine (SYNTHROID) 75 MCG tablet Take 75 mcg by mouth Daily   Yes Historical Provider, MD   atorvastatin (LIPITOR) 80 MG tablet Take 80 mg by mouth nightly   Yes Historical Provider, MD   apixaban (ELIQUIS) 2.5 MG TABS tablet Take 5 mg by mouth 2 times daily   Yes Historical Provider, MD   CINNAMON PO Take 4,000 mg by mouth 2 times daily   Yes Historical Provider, MD   MULTIPLE VITAMIN PO Take 1 tablet by mouth nightly   Yes Historical Provider, MD   Cholecalciferol (VITAMIN D) 50 MCG (2000 UT) CAPS capsule Take by mouth nightly    Yes Historical Provider, MD   Vitamin D, Cholecalciferol, 25 MCG (1000 UT) TABS Take 2 tablets by mouth nightly   Yes Historical Provider, MD   furosemide (LASIX) 20 MG tablet Take 20 mg by mouth daily   Yes Historical Provider, MD   glipiZIDE (GLUCOTROL XL) 2.5 MG extended release tablet Take 2.5 mg by mouth daily   Yes Historical Provider, MD   amLODIPine (NORVASC) 10 MG tablet Take 10 mg by mouth daily   Yes Historical Provider, MD   hydroCHLOROthiazide (HYDRODIURIL) 12.5 MG tablet Take 12.5 mg by mouth daily   Yes Historical Provider, MD   amiodarone (CORDARONE) 200 MG tablet Take 200 mg by mouth daily   Yes Historical Provider, MD   tamsulosin (FLOMAX) 0.4 MG capsule Take 0.4 mg by mouth nightly   Yes Historical Provider, MD   olmesartan (BENICAR) 40 MG tablet Take 40 mg by mouth daily   Yes Historical Provider, MD   Cyanocobalamin (VITAMIN B 12 PO) Take 5,000 mcg by mouth daily   Yes Historical Provider, MD   terbinafine (LAMISIL) 250 MG tablet Take 250 mg by mouth daily   Yes Historical Provider, MD   fluticasone (FLONASE) 50 MCG/ACT nasal spray 1 spray by Each Nostril route daily as needed for Rhinitis or Allergies   Yes Historical Provider, MD   Multiple Vitamins-Minerals (EYE VITAMINS PO) Take 1 tablet by mouth nightly OcuXanthin OTC supplement   Yes Historical Provider, MD   ascorbic acid (VITAMIN C) 500 MG tablet Take 2,000 mg by mouth daily    Yes Historical Provider, MD       Current Meds:   aspirin  81 mg Oral Daily    sodium chloride flush  5-40 mL Intravenous 2 times per day    tamsulosin  0.4 mg Oral Daily    levothyroxine  75 mcg Oral Daily    terbinafine  250 mg Oral Daily    insulin lispro  0-6 Units Subcutaneous TID WC    insulin lispro  0-3 Units Subcutaneous Nightly  hydroCHLOROthiazide  12.5 mg Oral Daily    losartan  100 mg Oral Daily    amLODIPine  10 mg Oral Daily    atorvastatin  80 mg Oral Nightly    amiodarone  200 mg Oral Daily    furosemide  20 mg Oral Daily    apixaban  5 mg Oral BID       Current Infused Meds:   sodium chloride      dextrose         Physical Exam:  Vitals:    08/09/21 0652   BP: 123/62   Pulse: 80   Resp: 16   Temp: 96.9 °F (36.1 °C)   SpO2: 98%       Intake/Output Summary (Last 24 hours) at 8/9/2021 0844  Last data filed at 8/8/2021 1943  Gross per 24 hour   Intake 240 ml   Output --   Net 240 ml     Estimated body mass index is 26.39 kg/m² as calculated from the following:    Height as of this encounter: 6' 1\" (1.854 m). Weight as of this encounter: 200 lb (90.7 kg). Physical Exam  Vitals reviewed. Constitutional:       Appearance: Normal appearance. HENT:      Head: Normocephalic. Mouth/Throat:      Mouth: Mucous membranes are moist.   Cardiovascular:      Rate and Rhythm: Normal rate and regular rhythm. Pulses: Normal pulses. Heart sounds: Normal heart sounds. No murmur heard. Pulmonary:      Effort: Pulmonary effort is normal.      Breath sounds: Normal breath sounds. Abdominal:      General: Bowel sounds are normal.      Palpations: Abdomen is soft. Tenderness: There is no abdominal tenderness. Musculoskeletal:         General: Normal range of motion. Right lower leg: No edema. Left lower leg: No edema. Skin:     General: Skin is warm. Neurological:      General: No focal deficit present. Mental Status: He is alert and oriented to person, place, and time.    Psychiatric:         Mood and Affect: Mood normal.         Behavior: Behavior normal.         Labs:  Recent Labs     08/08/21  1325 08/09/21  0004   WBC 5.6 4.4*   HGB 12.6* 11.5*    119*       Recent Labs     08/08/21  1325 08/09/21  0004    143   K 3.7 3.7    103   CO2 27 30*   BUN 17 16   CREATININE 1.0 1. 1   LABGLOM >60 >60   MG  --  2.3   CALCIUM 9.8 10.1       CK, CKMB, Troponin:   Recent Labs     08/08/21  1550 08/08/21 2013 08/09/21  0005   TROPONINI <0.01 <0.01 <0.01       Last 3 BNP:  Recent Labs     08/08/21  1325   PROBNP 78       IMAGING:    EKG -   Sinus bradycardia, heart rate 52 with first-degree heart block  Prolonged QT  No acute ischemic changes    ECHO      TTE procedure:ECHO 2D W/DOPPLER/COLOR/CONTRAST. Study Location: Echo Lab     Patient Status: Inpatient     BP: 123/62 mmHg     Indications:Chest pain and Atrial fibrillation.      Conclusions      Summary   LV is normal in size with normal systolic function. LV ejection fraction   estimated at 60%. Diastolic function appears preserved. RV is normal in size with normal systolic function. Normal left atrial size. Normal right atrial size. Aortic valve is trileaflet with mild leaflet thickening but preserved   mobility. No stenosis or significant regurgitation. Mitral valve appears structurally normal with normal leaflet mobility. Trace mitral regurgitation. No stenosis. No significant tricuspid regurgitation noted. No significant pericardial effusion. Signature      ----------------------------------------------------------------   Electronically signed by Randi Mann MD(Interpreting physician)   on 08/09/2021 10:09 PM   ----------------------------------------------------------------    Nuclear stress test       Impression   Impression:   There is no obvious infarct or ischemia, with a calculated ejection   fraction of 54 %. Suggest: Clinical correlation with consideration for medical   management. Submaximal exercise stress test with poor effort   tolerance. Signed by Dr Louise Carias    Result Date: 8/8/2021  1. No radiographic evidence of acute cardiopulmonary process. Signed by Dr Tania Schilling and Plan:     1.  Chest pain -patient was noted with significant chest pain, although the chest pain was resolved and when I saw him on the floor he denies any active angina or shortness of breath, EKG showed no acute ischemic changes, troponin negative x3, 2D echo showed normal LV systolic function with no significant valvular pathology, nuclear stress test showed no obvious infarct or ischemia . Patient was ruled out, no further cardiac work-up needed at this point, he can be discharged home per primary team follow-up with cardiology as outpatient since he moved here from [place]  2. Atrial fibrillation -patient has recent cardioversion, currently his rate and rhythm are sinus, continue with same medication including Eliquis for stroke prevention  3. Hypertension -blood pressure seem to be stable    Thank you for the consult, we appreciate the opportunity to provide care to your patients. Feel free to contact me if I can be of any further assistance.       Electronically signed by Bautista Srivastava MD on 8/9/2021 at 8:44 AM    Bautista Srivastava MD, [facility]  Interventional Cardiologist, Endovascular Specialist   Medical Director, Structural Heart Program   [facility]       (Please note that portions of this note were completed with a voice recognition program.  Effortswere made to edit the dictations but occasionally words are mis-transcribed.)

## 2021-08-10 VITALS
WEIGHT: 187.8 LBS | HEART RATE: 51 BPM | OXYGEN SATURATION: 98 % | HEIGHT: 73 IN | SYSTOLIC BLOOD PRESSURE: 117 MMHG | DIASTOLIC BLOOD PRESSURE: 61 MMHG | TEMPERATURE: 98 F | RESPIRATION RATE: 16 BRPM | BODY MASS INDEX: 24.89 KG/M2

## 2021-08-10 LAB
ALBUMIN SERPL-MCNC: 4.1 G/DL (ref 3.5–5.2)
ALP BLD-CCNC: 113 U/L (ref 40–130)
ALT SERPL-CCNC: 18 U/L (ref 5–41)
ANION GAP SERPL CALCULATED.3IONS-SCNC: 12 MMOL/L (ref 7–19)
AST SERPL-CCNC: 16 U/L (ref 5–40)
BASOPHILS ABSOLUTE: 0 K/UL (ref 0–0.2)
BASOPHILS RELATIVE PERCENT: 0.4 % (ref 0–1)
BILIRUB SERPL-MCNC: 0.4 MG/DL (ref 0.2–1.2)
BUN BLDV-MCNC: 21 MG/DL (ref 8–23)
CALCIUM SERPL-MCNC: 9.7 MG/DL (ref 8.8–10.2)
CHLORIDE BLD-SCNC: 105 MMOL/L (ref 98–111)
CO2: 26 MMOL/L (ref 22–29)
CREAT SERPL-MCNC: 1 MG/DL (ref 0.5–1.2)
EOSINOPHILS ABSOLUTE: 0.1 K/UL (ref 0–0.6)
EOSINOPHILS RELATIVE PERCENT: 1.7 % (ref 0–5)
GFR AFRICAN AMERICAN: >59
GFR NON-AFRICAN AMERICAN: >60
GLUCOSE BLD-MCNC: 106 MG/DL (ref 70–99)
GLUCOSE BLD-MCNC: 92 MG/DL (ref 74–109)
HCT VFR BLD CALC: 34.4 % (ref 42–52)
HEMOGLOBIN: 11.7 G/DL (ref 14–18)
IMMATURE GRANULOCYTES #: 0 K/UL
LYMPHOCYTES ABSOLUTE: 0.5 K/UL (ref 1.1–4.5)
LYMPHOCYTES RELATIVE PERCENT: 7.7 % (ref 20–40)
MAGNESIUM: 2 MG/DL (ref 1.6–2.4)
MCH RBC QN AUTO: 32.5 PG (ref 27–31)
MCHC RBC AUTO-ENTMCNC: 34 G/DL (ref 33–37)
MCV RBC AUTO: 95.6 FL (ref 80–94)
MONOCYTES ABSOLUTE: 0.6 K/UL (ref 0–0.9)
MONOCYTES RELATIVE PERCENT: 8 % (ref 0–10)
NEUTROPHILS ABSOLUTE: 5.7 K/UL (ref 1.5–7.5)
NEUTROPHILS RELATIVE PERCENT: 81.6 % (ref 50–65)
PDW BLD-RTO: 13.4 % (ref 11.5–14.5)
PERFORMED ON: ABNORMAL
PLATELET # BLD: 127 K/UL (ref 130–400)
PMV BLD AUTO: 10.4 FL (ref 9.4–12.4)
POTASSIUM SERPL-SCNC: 3.3 MMOL/L (ref 3.5–5)
RBC # BLD: 3.6 M/UL (ref 4.7–6.1)
SODIUM BLD-SCNC: 143 MMOL/L (ref 136–145)
TOTAL PROTEIN: 5.7 G/DL (ref 6.6–8.7)
WBC # BLD: 7 K/UL (ref 4.8–10.8)

## 2021-08-10 PROCEDURE — 36415 COLL VENOUS BLD VENIPUNCTURE: CPT

## 2021-08-10 PROCEDURE — 80053 COMPREHEN METABOLIC PANEL: CPT

## 2021-08-10 PROCEDURE — 85025 COMPLETE CBC W/AUTO DIFF WBC: CPT

## 2021-08-10 PROCEDURE — 6370000000 HC RX 637 (ALT 250 FOR IP): Performed by: INTERNAL MEDICINE

## 2021-08-10 PROCEDURE — 82947 ASSAY GLUCOSE BLOOD QUANT: CPT

## 2021-08-10 PROCEDURE — G0378 HOSPITAL OBSERVATION PER HR: HCPCS

## 2021-08-10 PROCEDURE — 83735 ASSAY OF MAGNESIUM: CPT

## 2021-08-10 PROCEDURE — 2580000003 HC RX 258: Performed by: INTERNAL MEDICINE

## 2021-08-10 RX ADMIN — ASPIRIN 81 MG: 81 TABLET, COATED ORAL at 10:15

## 2021-08-10 RX ADMIN — TERBINAFINE 250 MG: 250 TABLET ORAL at 10:15

## 2021-08-10 RX ADMIN — SODIUM CHLORIDE, PRESERVATIVE FREE 10 ML: 5 INJECTION INTRAVENOUS at 10:15

## 2021-08-10 RX ADMIN — APIXABAN 5 MG: 5 TABLET, FILM COATED ORAL at 10:15

## 2021-08-10 RX ADMIN — TAMSULOSIN HYDROCHLORIDE 0.4 MG: 0.4 CAPSULE ORAL at 10:15

## 2021-08-10 RX ADMIN — FUROSEMIDE 20 MG: 20 TABLET ORAL at 10:15

## 2021-08-10 RX ADMIN — AMLODIPINE BESYLATE 10 MG: 10 TABLET ORAL at 10:15

## 2021-08-10 RX ADMIN — AMIODARONE HYDROCHLORIDE 200 MG: 200 TABLET ORAL at 10:15

## 2021-08-10 RX ADMIN — LOSARTAN POTASSIUM 100 MG: 100 TABLET, FILM COATED ORAL at 10:15

## 2021-08-10 RX ADMIN — HYDROCHLOROTHIAZIDE 12.5 MG: 25 TABLET ORAL at 10:15

## 2021-08-10 RX ADMIN — LEVOTHYROXINE SODIUM 75 MCG: 75 TABLET ORAL at 06:10

## 2021-08-10 ASSESSMENT — PAIN SCALES - GENERAL: PAINLEVEL_OUTOF10: 0

## 2021-08-10 NOTE — DISCHARGE SUMMARY
Discharge Summary      Doreatha Leventhal  :  1940  MRN:  [id number]    Admit date:  2021  Discharge date:      Admitting Physician:  Berny Huerta MD    Advance Directive: Full Code    Consults: cardiology    Primary Care Physician:  No primary care provider on file. Discharge Diagnoses:  Principal Problem:    Chest pain  Active Problems:    Agent orange exposure    Atrial fibrillation (HCC)    Diabetes mellitus ([address]    Hyperlipidemia    Hypertension    Non Hodgkin's lymphoma (HCC)    BPH (benign prostatic hyperplasia)    Hypothyroidism    Chronic sinusitis  Resolved Problems:    * No resolved hospital problems. *      Significant Diagnostic Studies:   ECHO Complete 2D W Doppler W Color    Result Date: 2021  Transthoracic Echocardiography Report (TTE)  Demographics   Patient Name   Mabel Moreau   Date of Study           2021   MRN            [id number]        Gender                  Male   Date of Birth  1940    Room Number             [facility]-0714   Age            80 year(s)   Height:        73 inches     Referring Physician     erika lucia   Weight:        200 pounds    Sonographer             Heike Avendaño RDCS   BSA:           2.15 m^2      Interpreting Physician  Bianca Mann MD   BMI:           26.39 kg/m^2  Procedure Type of Study   TTE procedure:ECHO 2D W/DOPPLER/COLOR/CONTRAST. Study Location: Echo Lab Patient Status: Inpatient BP: 123/62 mmHg Indications:Chest pain and Atrial fibrillation. Conclusions   Summary  LV is normal in size with normal systolic function. LV ejection fraction  estimated at 60%. Diastolic function appears preserved. RV is normal in size with normal systolic function. Normal left atrial size. Normal right atrial size. Aortic valve is trileaflet with mild leaflet thickening but preserved  mobility. No stenosis or significant regurgitation. Mitral valve appears structurally normal with normal leaflet mobility. Trace mitral regurgitation. No stenosis.   No significant tricuspid regurgitation noted. No significant pericardial effusion. Signature   ----------------------------------------------------------------  Electronically signed by Randi Mann MD(Interpreting physician)  on 08/09/2021 10:09 PM  ----------------------------------------------------------------  M-Mode Measurements (cm)   LVIDd: 5.42 cm                       LVIDs: 3.52 cm  IVSd: 0.94 cm  LVPWd: 0.97 cm                       AO Root Dimension: 3.3 cm  % Ejection Fraction: 63 %            LA: 3.5 cm                                       LVOT: 2.2 cm  Doppler Measurements:   AV Peak Velocity:119 cm/s           MV Peak E-Wave: 84.4 cm/s  AV Peak Gradient: 5.66 mmHg         MV Peak A-Wave: 76.6 cm/s                                      MV E/A Ratio: 1.1 %                                      MV Peak Gradient: 2.85 mmHg      XR CHEST PORTABLE    Result Date: 8/8/2021  EXAMINATION: XR CHEST PORTABLE 8/8/2021 3:09 PM HISTORY: XR CHEST PORTABLE 8/8/2021 1:51 PM HISTORY: Chest pain COMPARISON: None. FINDINGS: The lungs are clear. The cardiomediastinal silhouette and pulmonary vascularity are within normal limits. Right shoulder prosthesis is present. Right central venous Port-A-Cath is present. .    1. No radiographic evidence of acute cardiopulmonary process. Signed by Dr Reymundo Ibarra    Result Date: 8/9/2021  Treadmill  Nuclear Stress Test Report Procedure date: 08/09/21 Indications: chest pain Procedure: Stress was performed using a standard Ravinder protocol. Vital signs and EKG were monitored. Technetium-99 sestamibi was injected in divided doses, approximately 10 mCi and 30 mCi respectively for rest and stress imaging. The patient was discharged in stable condition. Results: Patient had  dyspnea and abdominal cramping during exercise that resolved in recovery. Baseline EKG showed sinus bradycardia without ST/T changes.  During stress there was  nonspecific ST changes. Changes resolved in recovery. Baseline and peak blood pressures were 117/81, and 191/37 respectively. Baseline and peak heart rates were 56 and  109 respectively. The patient exercised for 3:42 minutes on a standard Ravinder protocol, NOT obtaining target heart rate. Nuclear Stress Test Report Results: Patient had symptoms of dyspnea during infusion that resolved in recovery. Baseline EKG showed normal sinus rhythm without ST/T changes. During stress there were no significant EKG changes or rhythm changes. Baseline and peak blood pressures were 134/33, and 134/33 respectively. Baseline and peak heart rates were 95 and  95 respectively. Lexiscan/Cardiolyte Nuclear Medicine Report Date of Procedure: 8/9/2021 The patient was injected with 6.60 millicuries (mCi) of Technetium (Tc99m). After an appropriate level of stress the patient was re-injected with 62.4 millicuries (mCi) of Technetium (Tc99m). Repeat gated images were then performed per standard protocol. Findings: 1. Analysis of the the stress and rest images reveals no obvious defects on stress and rest images. 2.  Analysis of the gated images reveals grossly normal left ventricular function with a calculated ejection fraction of 54 %. Impression: There is no obvious infarct or ischemia, with a calculated ejection fraction of 54 %. Suggest: Clinical correlation with consideration for medical management. Submaximal exercise stress test with poor effort tolerance.  Signed by Dr Narayan Peck:   CBC:   Recent Labs     08/08/21  1325 08/09/21  0004 08/10/21  0214   WBC 5.6 4.4* 7.0   HGB 12.6* 11.5* 11.7*    119* 127*     BMP:    Recent Labs     08/08/21  1325 08/09/21  0004 08/10/21  0214    143 143   K 3.7 3.7 3.3*    103 105   CO2 27 30* 26   BUN 17 16 21   CREATININE 1.0 1.1 1.0   GLUCOSE 147* 105 92     INR:   Recent Labs     08/08/21  1325   INR 1.12     ABGs:No results for input(s): PH, PO2, PCO2, HCO3, BE, O2SAT in the last 72 hours. Lactic Acid:No results for input(s): LACTA in the last 72 hours. Procedures:     Lexiscan  Impression:   There is no obvious infarct or ischemia, with a calculated ejection   fraction of 54 %. Suggest: Clinical correlation with consideration for medical   management. Submaximal exercise stress test with poor effort   tolerance. Signed by Dr Andrea Martin Course: 80 y. o. male with a past medical history of atrial fibrillation on amiodarone/Eliquis, hypertension, hypothyroidism, BPH, diabetes, hyperlipidemia, non-Hodgkin's lymphoma, chronic sinusitis who presented to the ED complaining of chest pain. Cardiology consulted admission with TTE and Lexiscan stress test performed during admission which were both grossly unremarkable. Patient no longer has chest pain and is currently hemodynamically stable for discharge home today to follow-up with PCP and cardiology as an outpatient. Patient has asked for a outpatient referral for oncology regarding follow-up of his known non-Hodgkin's lymphoma and this is been made for the patient. No medication changes have been made during this admission. Physical Exam:  Vitals: /61   Pulse 51   Temp 98 °F (36.7 °C) (Temporal)   Resp 16   Ht 6' 1\" (1.854 m)   Wt 187 lb 12.8 oz (85.2 kg)   SpO2 98%   BMI 24.78 kg/m²   24HR INTAKE/OUTPUT:      Intake/Output Summary (Last 24 hours) at 8/10/2021 0816  Last data filed at 8/9/2021 1101  Gross per 24 hour   Intake 10 ml   Output --   Net 10 ml     General appearance: Alert  Head: NC/AT  Eyes: conjunctivae/corneas clear.    Ears: normal external ears  Neck: supple  Lungs: BLAE, no increased work of breathing  Heart: regular rate and rhythm, no murmurs appreciated  Abdomen: BS+, soft, NT, ND  Extremities: no edema, pulses+  Skin: warm  Neurologic: Alert, gross motor function intact  Psychiatric:  calm    Discharge Medications:       Patrici Brush Medication Instructions SXS:[id number]    Printed on:08/10/21 0786   Medication Information                      amiodarone (CORDARONE) 200 MG tablet  Take 200 mg by mouth daily             amLODIPine (NORVASC) 10 MG tablet  Take 10 mg by mouth daily             apixaban (ELIQUIS) 2.5 MG TABS tablet  Take 5 mg by mouth 2 times daily             ascorbic acid (VITAMIN C) 500 MG tablet  Take 2,000 mg by mouth daily              atorvastatin (LIPITOR) 80 MG tablet  Take 80 mg by mouth nightly             Cholecalciferol (VITAMIN D) 50 MCG (2000 UT) CAPS capsule  Take by mouth nightly              CINNAMON PO  Take 4,000 mg by mouth 2 times daily             Cyanocobalamin (VITAMIN B 12 PO)  Take 5,000 mcg by mouth daily             fluticasone (FLONASE) 50 MCG/ACT nasal spray  1 spray by Each Nostril route daily as needed for Rhinitis or Allergies             furosemide (LASIX) 20 MG tablet  Take 20 mg by mouth daily             glipiZIDE (GLUCOTROL XL) 2.5 MG extended release tablet  Take 2.5 mg by mouth daily             hydroCHLOROthiazide (HYDRODIURIL) 12.5 MG tablet  Take 12.5 mg by mouth daily             levothyroxine (SYNTHROID) 75 MCG tablet  Take 75 mcg by mouth Daily             MULTIPLE VITAMIN PO  Take 1 tablet by mouth nightly             Multiple Vitamins-Minerals (EYE VITAMINS PO)  Take 1 tablet by mouth nightly OcuXanthin OTC supplement             olmesartan (BENICAR) 40 MG tablet  Take 40 mg by mouth daily             tamsulosin (FLOMAX) 0.4 MG capsule  Take 0.4 mg by mouth nightly             terbinafine (LAMISIL) 250 MG tablet  Take 250 mg by mouth daily             Vitamin D, Cholecalciferol, 25 MCG (1000 UT) TABS  Take 2 tablets by mouth nightly                 Discharge Instructions: Follow up with No primary care provider on file. in 7 days. Take medications as directed. Resume activity as tolerated. Diet: ADULT DIET;  Regular; 4 carb choices (60 gm/meal)     Disposition: Patient is medically stable and will be discharged Home. Time spent on discharge 35 minutes.     Signed:  Henrietta Valencia MD 8/10/2021 8:16 AM

## 2021-08-11 ENCOUNTER — CARE COORDINATION (OUTPATIENT)
Dept: CASE MANAGEMENT | Age: 81
End: 2021-08-11

## 2021-08-11 NOTE — CARE COORDINATION
Andre [facility] Transitions Initial Follow Up Call    Call within 2 business days of discharge: Yes    Patient: Ulysses Fujita Patient : 1940   MRN: [id number]  Reason for Admission:   Discharge Date: 8/10/21 RARS: No data recorded    Last Discharge [facility]       Complaint Diagnosis Description Type Department Provider    21 Chest Pain Chest pain, unspecified type ED to Hosp-Admission (Discharged) (ADMITTED) [facility] Bangura MD; Soha Olivarez MD           Spoke with: N/A    Facility: [place] [id number]      Non-face-to-face services provided:  Reviewed encounter information for continuity of care prior to follow up phone call - chart notes, consults, progress notes, test results, med list, appointments, AVS, other information. Care Transitions 24 Hour Call    Care Transitions Interventions         Follow Up : Attempted to make contact with Carol Munoz for an initial follow up call post discharge from the hospital without success. Unable to leave a message regarding intent of call and call back information. Will try again my next business day. No future appointments.     Dorlene Pallas, RN

## 2021-08-12 ENCOUNTER — CARE COORDINATION (OUTPATIENT)
Dept: CASE MANAGEMENT | Age: 81
End: 2021-08-12

## 2021-08-12 NOTE — CARE COORDINATION
[facility] Transitions Initial Follow Up Call    Call within 2 business days of discharge: Yes    Patient: Norwood Goldmann Patient : 1940   MRN: [id number]  Reason for Admission:   Discharge Date: 8/10/21 RARS: No data recorded    Last Discharge [facility]       Complaint Diagnosis Description Type Department Provider    21 Chest Pain Chest pain, unspecified type ED to Hosp-Admission (Discharged) (ADMITTED) [facility] Estrada MD; Leah Lewis MD           Spoke with: N/A    Facility: [place] [id number]    Non-face-to-face services provided:  Reviewed encounter information for continuity of care prior to follow up phone call - chart notes, consults, progress notes, test results, med list, appointments, AVS, other information. Care Transitions 24 Hour Call    Care Transitions Interventions         Follow Up ; Attempt #2 to make contact with Marcy Mejia for an initial follow up call post discharge from the hospital without success. Unable to leave a message regarding intent of call and call back information. Will discharge from CTN services at this time due to inability to make contact. No future appointments.     Tomeka Pardo RN

## 2021-11-22 DIAGNOSIS — C82.90 FOLLICULAR LYMPHOMA, UNSPECIFIED GRADE, UNSPECIFIED BODY REGION (HCC): Primary | ICD-10-CM

## 2021-11-30 ENCOUNTER — HOSPITAL ENCOUNTER (OUTPATIENT)
Dept: INFUSION THERAPY | Age: 81
End: 2021-11-30

## 2021-12-06 NOTE — PROGRESS NOTES
MEDICAL ONCOLOGY CONSULTATION    Pt Name: Rika Baez  MRN: [id number]  YOB: 1940  Date of evaluation: 12/8/2021    REASON FOR CONSULTATION: History of lymphoma  [facility]    History Obtained From:  patient and old medical records    HISTORY OF PRESENT ILLNESS:    Diagnosis  · B-cell lymphoma, consistent with Mantle cell lymphoma, June 2017  · CD20+  · Translocation 11, 14  · Cyclin D1 positive  · SOX-11 positive    Treatment Summary  · 8/2/17 - 12/19/17 Completion bendamustine/Rituxan x6 cycles  · 2/16/18 - 12/18/19 Completion Maintenance Rituxan every 2 months    Hematology/Cancer History  Rika Baez was first seen by me on 12/8/2021. He presents to [facility] continued of care of a history of mantle cell lymphoma. He was treated in [place], [place]. He received induction chemotherapy with bendamustine/rituximab followed by 2 years of maintenance rituximab. · 12/16/16 US Abdominal ([facility], [place]):  20 cm x 9.7 cm splenomegaly  · 5/19/17 CT chest, abdomen, pelvis ([address], [place]): Splenomegaly is similar to the previous exam. No evidence of adenopathy involving the chest abdomen or pelvis. Small nonspecific granular nodule the right upper lobe. Follow-up imaging to assess stability recommended. · 6/26/17 Bone marrow ([facility] [place], [place]): Mildly hypercellular, 40%. CD20+ B-cell lymphoma consistent with mantle cell lymphoma. B-cell lymphoma involves 45%-50% of marrow cellularity. Trilineage hematopoiesis with adequate megakaryocytes. Iron increased (3+/4+). FLOW: CD20+, CD5+, monoclonal B-cell population. Monoclonal B-cell population is CD5+, CD19+, Bright  CD20+, Lambda+, CD23 dim and involves 17% of events. No increased blast population. KARYOTYPE: 45,X,-Y [4]/46,XY[16]. FISH: IGH/BCL1 translocation was detected. MOLECULAR: IgVh somatic hypermutation analysis failed to detect a prominent clonal B-cell population.  COMMENT: The monoclonal B-cell population demonstrates weak staining with Cyclin D1, most suggestive of mantle cell lymphoma. FISH for t(11;14) has been requested for definitive evaluation as mantle cell lymphoma. SOX-11 IHC stain is positive in the lymphocytes, confirming classical  Type (aggressive) mantle cell lymphoma. · [facility]17 [facility] [place] No focal area of abnormal uptake. Splenomegaly. · 7/28/17 MUGA scan ([address], [place]): Normal LVEF of 66%. No wall motion abnormalities. · 10/23/17 CT chest, abdomen, pelvis ([address], [place]): Decrease in splenomegaly. No lymphadenopathy or other definite suspicious findings. Unchanged tiny bilateral pulmonary nodules, favored to be benign. Attention on follow-up recommended. Colonic diverticulosis. Prostatomegaly. · 8/2/17 - 12/19/17 CompletionTreanda Rituxan x6 cycles  · 2/9/18 CT neck, chest, abdomen, pelvis ([address], [place]): No acute abnormality. No significant change in splenomegaly. No  Lymphadenopathy. Redemonstration of incidental findings to include colonic diverticulosis, prostatomegaly, coronary atherosclerotic disease, and tiny likely benign bilateral pulmonary nodules. New mild bilateral lower lobe reticular ground-glass opacities, infectious/inflammatory in appearance. · 2/16/18 - 12/18/19 Completion Maintenance Rituxan every 2 months  · 9/26/18 CT neck, chest, abdomen, pelvis ([address], [place]): No CT evidence of malignancy, lymphadenopthay or metastatic disease within the neck, chest, abdomen, pelvis. No significant change in mild splenomegaly. · 11/15/18 MRI C-spine [facility]): Multilevel degenerative disc and facet disease. Multilevel bilateral neural foraminal narrowing, greatest at C5-C6 where it is severe bilaterally. The vocal cords are opposed, limiting the evaluation of the neck area for a mass.  No CT evidence of malignancy, lymphadenopthay or metastatic disease within the neck, chest, abdomen, pelvis. The spleen is now normal size. · 5/9/19 CT neck, chest, abdomen, pelvis ([address], [place]): No CT evidence of malignancy, lymphadenopthay or metastatic disease within the neck, chest, abdomen, pelvis. · 12/17/19 CT neck, chest, abdomen, pelvis ([address], [place]): No CT evidence of malignancy, lymphadenopthay or metastatic disease within the neck, chest, abdomen, pelvis. · 6/9/20 CT chest, abdomen, pelvis ([address], [place]): No CT evidence of malignancy, lymphadenopthay or metastatic disease within the chest, abdomen, pelvis. · 6/9/20  IgG 597.1, IgA 72.9, IgM 58, Kappa 18.32, Lambda 12.67, Kappa/Lambda ratio 1.45  · 10/27/20  IgG 631.1, IgA 95.3, IgM 63, Kappa 18.37, Lambda 13.12, Kappa/Lambda ratio 1.40  · 2/23/21  IgG 596, IgA 83.1, IgM 65,  · 3/23/21 CT chest, abdomen, pelvis ([address], [place]): No CT evidence of malignancy, lymphadenopathy or metastatic disease within the chest, abdomen, pelvis. Extensive coronary artery calcifications. · 6/22/21- IgG 534.6, IgA 91.8, IgM 59, Kappa 19.25, Lambda 16.27, Kappa/Lambda ratio 1.18  · 12/8/2021she was first seen by me. Recommend continued surveillance. Past Medical History:    Past Medical History:   Diagnosis Date    Agent orange exposure     [facility]     Diabetes mellitus [facility]     Hyperlipidemia     Hypertension     Non Hodgkin's lymphoma ([facility]     Non-Hodgkin lymphoma ([address]        Past Surgical History:    Past Surgical History:   Procedure Laterality Date    ANKLE SURGERY      CARDIAC CATHETERIZATION      JOINT REPLACEMENT      SHOULDER ARTHROPLASTY         Social History:    Marital status:    Smoking status:Former;[id number]  ETOH status:No  Resides: [place]  Prior exposure round-up and Asbestos, agent orange    Family History:   · Brother, Lung cancer    Current Hospital Medications:    Current Outpatient Medications   Medication Sig Dispense Refill    levothyroxine (SYNTHROID) 75 MCG tablet Take 75 mcg by mouth Daily      atorvastatin (LIPITOR) 80 MG tablet Take 80 mg by mouth nightly      apixaban (ELIQUIS) 2.5 MG TABS tablet Take 5 mg by mouth 2 times daily      CINNAMON PO Take 4,000 mg by mouth 2 times daily      MULTIPLE VITAMIN PO Take 1 tablet by mouth nightly      Cholecalciferol (VITAMIN D) 50 MCG (2000 UT) CAPS capsule Take by mouth nightly       Vitamin D, Cholecalciferol, 25 MCG (1000 UT) TABS Take 2 tablets by mouth nightly      amLODIPine (NORVASC) 10 MG tablet Take 10 mg by mouth daily      hydroCHLOROthiazide (HYDRODIURIL) 12.5 MG tablet Take 12.5 mg by mouth daily      amiodarone (CORDARONE) 200 MG tablet Take 200 mg by mouth daily      tamsulosin (FLOMAX) 0.4 MG capsule Take 0.4 mg by mouth nightly      olmesartan (BENICAR) 40 MG tablet Take 40 mg by mouth daily      Cyanocobalamin (VITAMIN B 12 PO) Take 5,000 mcg by mouth daily      terbinafine (LAMISIL) 250 MG tablet Take 250 mg by mouth daily      fluticasone (FLONASE) 50 MCG/ACT nasal spray 1 spray by Each Nostril route daily as needed for Rhinitis or Allergies      Multiple Vitamins-Minerals (EYE VITAMINS PO) Take 1 tablet by mouth nightly OcuXanthin OTC supplement      ascorbic acid (VITAMIN C) 500 MG tablet Take 2,000 mg by mouth daily       furosemide (LASIX) 20 MG tablet Take 20 mg by mouth daily (Patient not taking: Reported on 12/8/2021)      glipiZIDE (GLUCOTROL XL) 2.5 MG extended release tablet Take 2.5 mg by mouth daily (Patient not taking: Reported on 12/8/2021)       No current facility-administered medications for this visit. Allergies: No Known Allergies      Subjective   REVIEW OF SYSTEMS:   CONSTITUTIONAL: no fever, no night sweats, no fatigue, chills, unintentional weight loss;   HEENT: no blurring of vision, no double vision, no hearing difficulty, no tinnitus, no ulceration, no dysplasia, no epistaxis;  LUNGS: no cough, no hemoptysis, no wheeze,   shortness of breath;  CARDIOVASCULAR: calf swelling,no palpitation, atypical chest pain,  shortness of breath;  GI:  abdominal pain, no nausea, no vomiting, no diarrhea,  constipation;  SON: no dysuria, no hematuria, no frequency or urgency, no nephrolithiasis;  MUSCULOSKELETAL:back pain,  joint pain, no swelling, no stiffness;  ENDOCRINE: no polyuria, no polydipsia,  cold intolerance;  HEMATOLOGY: no easy bruising or bleeding, no history of clotting disorder;  DERMATOLOGY: no skin rash, no eczema, no pruritus;  PSYCHIATRY: no depression, no anxiety, no panic attacks, no suicidal ideation, no homicidal ideation;  NEUROLOGY:balance issues, no syncope, no seizures, no numbness or tingling of hands, no numbness or tingling of feet, no paresis;    Objective   /66   Pulse 65   Ht 6' 1\" (1.854 m)   Wt 197 lb 11.2 oz (89.7 kg)   SpO2 97%   BMI 26.08 kg/m²     PHYSICAL EXAM:  CONSTITUTIONAL: Alert, appropriate, no acute distress  EYES: Non icteric, EOM intact, pupils equal round   ENT:congestion, Mucus membranes moist, no oral pharyngeal lesions, external inspection of ears and nose are normal  NECK: Supple, no masses. No palpable thyroid mass  CHEST/LUNGS: CTA bilaterally, normal respiratory effort   CARDIOVASCULAR: RRR, no murmurs. No lower extremity edema  ABDOMEN: soft non-tender, active bowel sounds, no HSM. No palpable masses  EXTREMITIES: warm, full ROM in all 4 extremities, no focal weakness. SKIN: warm, dry with no rashes or lesions  LYMPH: No cervical, clavicular, axillary, or inguinal lymphadenopathy  NEUROLOGIC: follows commands, non focal   PSYCH: mood and affect appropriate.   Alert and oriented to time, place, person      LABORATORY RESULTS REVIEWED/ANALYZED BY ME:  12/8/2021 CBC  WBC 3.57  HGB 12.2    Neut 2.48    RADIOLOGY STUDIES REVIEWED BY ME:  As above     ASSESSMENT:    Orders Placed This Encounter   Procedures    CT CHEST W CONTRAST     Standing Status:   Future     Standing Expiration Date:   12/8/2022     Order Specific Question:   Reason for exam:     Answer:   assess response to treatment    CT ABDOMEN PELVIS W IV CONTRAST Additional Contrast? Oral     Standing Status:   Future     Standing Expiration Date:   12/8/2022     Order Specific Question:   Additional Contrast?     Answer:   Oral     Order Specific Question:   Reason for exam:     Answer:   assess reponse to treatment    Comprehensive Metabolic Panel     Standing Status:   Future     Standing Expiration Date:   12/8/2022    Lactate Dehydrogenase     Standing Status:   Future     Standing Expiration Date:   12/8/2022    External Referral To Dermatology     Referral Priority:   Routine     Referral Type:   Eval and Treat     Referral Reason:   Specialty Services Required     Requested Specialty:   Dermatology     Number of Visits Requested:   1    External Referral To ENT     Referral Priority:   Routine     Referral Type:   Eval and Treat     Referral Reason:   Specialty Services Required     Requested Specialty:   Otolaryngology     Number of Visits Requested:   1      Matha Gilford was seen today for new patient. Diagnoses and all orders for this visit:    Follicular lymphoma, unspecified grade, unspecified body region [facility])  -     Comprehensive Metabolic Panel; Future  -     Lactate Dehydrogenase; Future  -     CT CHEST W CONTRAST; Future  -     CT ABDOMEN PELVIS W IV CONTRAST Additional Contrast? Oral; Future    Care plan discussed with patient    Skin lesions  -     External Referral To Dermatology    Lesion of nose  -     External Referral To ENT    Mantle cell lymphoma, SOX11 positive, June 2017  The patient was counseled today about diagnosis, staging, prognosis, diagnostic tests and disease management. Essentially, status post completion of induction chemotherapy with bendamustine/rituximab followed by 2 years of maintenance rituximab completed December 2019.   He is in clinical/radiologic complete response.  -Continue clinical/short-term surveillance  -Repeat CT chest, abdomen, pelvis March 2022  -RTC MD after scans    Environmental exposureDriver/agent orange. This may have contributed to his diagnosis of lymphoma. More likely than not. A. fibcontinue Eliquis. Advised compliance. Skin lesion over right mandibular regionreferral to dermatology. Lesion has been present for 6 months. Right nare lesion-referral to ENT    PLAN:  · RTC with MD in March 2022 and scans  · Recommend CT Chest,Abdomen,Pelvis in March 2022  · Recommend Port Flush today and every 8 weeks  · CMP,LDH labs today  · Refer to Dr. Emir Jeffries ENT  · Refer to Dr. Gina Chun Dermatology    I, Robley Krabbe, am pre-charting as a registered nurse for Jackie Madden MD. Electronically signed by Robley Krabbe, RN on 12/8/2021 at 5:57 PM CST. Howard Mckenzie am scribing as Medical Assistant for Jackie Madden MD. Electronically signed by Jack Hernández MA on 12/8/2021 at 10:30 AM CST. I, Dr Brian Silva, personally performed the services described in this documentation as scribed by Jack Hernádnez MA in my presence and is both accurate and complete. I have seen, examined and reviewed this patient medication list, appropriate labs and imaging studies. I reviewed relevant medical records and others physicians notes. I discussed the plans of care with the patient. I answered all the questions to the patients satisfaction. I have also reviewed the chief complaint (CC) and part of the history (History of Present Illness (HPI), Past Family Social History [facility]), or Review of Systems (ROS) and made changes when appropriated.        (Please note that portions of this note were completed with a voice recognition program. Efforts were made to edit the dictations but occasionally words are mis-transcribed.)    Electronically signed by Jackie Madden MD on 12/8/2021 at 10:42 AM      The total time (60 min) I spent to see the patient today includes at least one or more of the following: preparing to see the patient by reviewing prior tests, prior notes or other relevant information, performing appropriate independent examination and evaluation, counseling, ordering of medications, tests or procedures, referrals, communicating with other healthcare professionals when appropriated to coordinate care, documenting clinic information in the electronic medical record or other health records, independently interpreting results of tests, managing test results and communicating the results to the patient/family or caregiver.

## 2021-12-08 ENCOUNTER — OFFICE VISIT (OUTPATIENT)
Dept: HEMATOLOGY | Age: 81
End: 2021-12-08
Payer: MEDICARE

## 2021-12-08 ENCOUNTER — HOSPITAL ENCOUNTER (OUTPATIENT)
Dept: INFUSION THERAPY | Age: 81
Discharge: HOME | End: 2021-12-08
Payer: MEDICARE

## 2021-12-08 VITALS
BODY MASS INDEX: 26.2 KG/M2 | WEIGHT: 197.7 LBS | HEART RATE: 65 BPM | OXYGEN SATURATION: 97 % | DIASTOLIC BLOOD PRESSURE: 66 MMHG | SYSTOLIC BLOOD PRESSURE: 132 MMHG | HEIGHT: 73 IN

## 2021-12-08 DIAGNOSIS — Z71.89 CARE PLAN DISCUSSED WITH PATIENT: ICD-10-CM

## 2021-12-08 DIAGNOSIS — J34.89 LESION OF NOSE: ICD-10-CM

## 2021-12-08 DIAGNOSIS — C82.90 FOLLICULAR LYMPHOMA, UNSPECIFIED GRADE, UNSPECIFIED BODY REGION (HCC): Primary | ICD-10-CM

## 2021-12-08 DIAGNOSIS — L98.9 SKIN LESIONS: ICD-10-CM

## 2021-12-08 DIAGNOSIS — C82.90 FOLLICULAR LYMPHOMA, UNSPECIFIED GRADE, UNSPECIFIED BODY REGION (HCC): ICD-10-CM

## 2021-12-08 LAB
ALBUMIN SERPL-MCNC: 4.3 G/DL (ref 3.5–5.2)
ALP BLD-CCNC: 124 U/L (ref 40–130)
ALT SERPL-CCNC: 24 U/L (ref 21–72)
ANION GAP SERPL CALCULATED.3IONS-SCNC: 6 MMOL/L (ref 7–19)
AST SERPL-CCNC: 31 U/L (ref 17–59)
BASOPHILS ABSOLUTE: 0.02 K/UL (ref 0.01–0.08)
BASOPHILS RELATIVE PERCENT: 0.6 % (ref 0.1–1.2)
BILIRUB SERPL-MCNC: 1 MG/DL (ref 0.2–1.3)
BUN BLDV-MCNC: 17 MG/DL (ref 9–20)
CALCIUM SERPL-MCNC: 10.4 MG/DL (ref 8.4–10.2)
CHLORIDE BLD-SCNC: 104 MMOL/L (ref 98–111)
CO2: 31 MMOL/L (ref 22–29)
CREAT SERPL-MCNC: 1.1 MG/DL (ref 0.6–1.2)
EOSINOPHILS ABSOLUTE: 0.08 K/UL (ref 0.04–0.54)
EOSINOPHILS RELATIVE PERCENT: 2.2 % (ref 0.7–7)
GFR NON-AFRICAN AMERICAN: >60
GLUCOSE BLD-MCNC: 139 MG/DL (ref 74–106)
HCT VFR BLD CALC: 37.8 % (ref 40.1–51)
HEMOGLOBIN: 12.2 G/DL (ref 13.7–17.5)
LACTATE DEHYDROGENASE: 505 U/L (ref 313–618)
LYMPHOCYTES ABSOLUTE: 0.57 K/UL (ref 1.18–3.74)
LYMPHOCYTES RELATIVE PERCENT: 16 % (ref 19.3–53.1)
MCH RBC QN AUTO: 33.3 PG (ref 25.7–32.2)
MCHC RBC AUTO-ENTMCNC: 32.3 G/DL (ref 32.3–36.5)
MCV RBC AUTO: 103.3 FL (ref 79–92.2)
MONOCYTES ABSOLUTE: 0.42 K/UL (ref 0.24–0.82)
MONOCYTES RELATIVE PERCENT: 11.8 % (ref 4.7–12.5)
NEUTROPHILS ABSOLUTE: 2.48 K/UL (ref 1.56–6.13)
NEUTROPHILS RELATIVE PERCENT: 69.4 % (ref 34–71.1)
PDW BLD-RTO: 13.8 % (ref 11.6–14.4)
PLATELET # BLD: 108 K/UL (ref 163–337)
PMV BLD AUTO: 10.4 FL (ref 7.4–10.4)
POTASSIUM SERPL-SCNC: 3.6 MMOL/L (ref 3.5–5.1)
RBC # BLD: 3.66 M/UL (ref 4.63–6.08)
SODIUM BLD-SCNC: 141 MMOL/L (ref 137–145)
TOTAL PROTEIN: 6.8 G/DL (ref 6.3–8.2)
WBC # BLD: 3.57 K/UL (ref 4.23–9.07)

## 2021-12-08 PROCEDURE — 85025 COMPLETE CBC W/AUTO DIFF WBC: CPT

## 2021-12-08 PROCEDURE — G8484 FLU IMMUNIZE NO ADMIN: HCPCS | Performed by: INTERNAL MEDICINE

## 2021-12-08 PROCEDURE — 83615 LACTATE (LD) (LDH) ENZYME: CPT

## 2021-12-08 PROCEDURE — 80053 COMPREHEN METABOLIC PANEL: CPT

## 2021-12-08 PROCEDURE — G8427 DOCREV CUR MEDS BY ELIG CLIN: HCPCS | Performed by: INTERNAL MEDICINE

## 2021-12-08 PROCEDURE — 4040F PNEUMOC VAC/ADMIN/RCVD: CPT | Performed by: INTERNAL MEDICINE

## 2021-12-08 PROCEDURE — 99213 OFFICE O/P EST LOW 20 MIN: CPT

## 2021-12-08 PROCEDURE — 99205 OFFICE O/P NEW HI 60 MIN: CPT | Performed by: INTERNAL MEDICINE

## 2021-12-08 PROCEDURE — 1123F ACP DISCUSS/DSCN MKR DOCD: CPT | Performed by: INTERNAL MEDICINE

## 2021-12-08 PROCEDURE — G8417 CALC BMI ABV UP PARAM F/U: HCPCS | Performed by: INTERNAL MEDICINE

## 2021-12-08 PROCEDURE — 1036F TOBACCO NON-USER: CPT | Performed by: INTERNAL MEDICINE

## 2021-12-08 PROCEDURE — 36415 COLL VENOUS BLD VENIPUNCTURE: CPT

## 2021-12-27 ENCOUNTER — OFFICE VISIT (OUTPATIENT)
Dept: CARDIOLOGY CLINIC | Age: 81
End: 2021-12-27
Payer: MEDICARE

## 2021-12-27 VITALS
WEIGHT: 197 LBS | BODY MASS INDEX: 26.11 KG/M2 | DIASTOLIC BLOOD PRESSURE: 61 MMHG | HEIGHT: 73 IN | HEART RATE: 63 BPM | SYSTOLIC BLOOD PRESSURE: 121 MMHG

## 2021-12-27 DIAGNOSIS — E78.5 HYPERLIPIDEMIA, UNSPECIFIED HYPERLIPIDEMIA TYPE: ICD-10-CM

## 2021-12-27 DIAGNOSIS — I48.0 PAROXYSMAL ATRIAL FIBRILLATION (HCC): Primary | ICD-10-CM

## 2021-12-27 DIAGNOSIS — I10 HYPERTENSION, UNSPECIFIED TYPE: ICD-10-CM

## 2021-12-27 PROCEDURE — G8484 FLU IMMUNIZE NO ADMIN: HCPCS | Performed by: NURSE PRACTITIONER

## 2021-12-27 PROCEDURE — 4040F PNEUMOC VAC/ADMIN/RCVD: CPT | Performed by: NURSE PRACTITIONER

## 2021-12-27 PROCEDURE — 1123F ACP DISCUSS/DSCN MKR DOCD: CPT | Performed by: NURSE PRACTITIONER

## 2021-12-27 PROCEDURE — 1036F TOBACCO NON-USER: CPT | Performed by: NURSE PRACTITIONER

## 2021-12-27 PROCEDURE — G8427 DOCREV CUR MEDS BY ELIG CLIN: HCPCS | Performed by: NURSE PRACTITIONER

## 2021-12-27 PROCEDURE — 99213 OFFICE O/P EST LOW 20 MIN: CPT | Performed by: NURSE PRACTITIONER

## 2021-12-27 PROCEDURE — G8417 CALC BMI ABV UP PARAM F/U: HCPCS | Performed by: NURSE PRACTITIONER

## 2021-12-27 RX ORDER — LEVOTHYROXINE SODIUM 88 UG/1
88 TABLET ORAL DAILY
COMMUNITY

## 2021-12-27 ASSESSMENT — ENCOUNTER SYMPTOMS
SHORTNESS OF BREATH: 0
ABDOMINAL PAIN: 0
FACIAL SWELLING: 0
WHEEZING: 0
EYE REDNESS: 0
ABDOMINAL DISTENTION: 0
EYE DISCHARGE: 0
NAUSEA: 0
COUGH: 0
COLOR CHANGE: 0
VOMITING: 0
TROUBLE SWALLOWING: 0
BLOOD IN STOOL: 0

## 2021-12-27 NOTE — PROGRESS NOTES
[address] [place] [address] Cardiology  [address]  [id number]  Phone: [phone]  Fax: [phone]    OFFICE VISIT:  2021    Marion Kelsey - : 1940    Reason For Visit:  Mickie Chun is a 80 y.o. male who is here for Follow-Up from Hospital      HPI    Mr. Indu Cooper is a 80 y.o. male with history of hyperlipidemia, hypertension, non-Hodgkin's lymphoma, agent orange exposure, proximal atrial fibrillation, diabetes who presents with the chief complaint of hospital follow-up. Patient was in the hospital in August for chest pain. He states since then he has been doing well. He had one episode of chest discomfort that started in his stomach. He was seen at [facility] and we will request these records. He states it has been over a month ago when he has no complaints. Mickie Chun has no exertional chest pain, pressure, burning, tightness or squeezing. No symptomatic tachy- or mgehann-arrhythmia. No lightheadedness, dizziness, or syncope. No numbness or weakness to suggest cerebrovascular accident or transient ischemic attack. he denies signs of bleeding. Reports no edema or shortness of breath. He denies orthopnea or paroxysmal nocturnal dyspnea. his BP has been controlled at home. he reports no activity change. PCP follows lipids and labs. Dr. Cooper Mari is patient's cardiologist.       Gay Arriaga MD is PCP.   Marion Kelsey has the following history as recorded in [facility]:    Patient Active Problem List    Diagnosis Date Noted    Chest pain 2021    Agent orange exposure     Atrial fibrillation ([address]     Diabetes mellitus ([address]     Hyperlipidemia     Hypertension     Non Hodgkin's lymphoma ([address]     BPH (benign prostatic hyperplasia)     Hypothyroidism     Chronic sinusitis      Past Medical History:   Diagnosis Date    Agent orange exposure     Atrial fibrillation ([address]     Diabetes mellitus ([address]     Hyperlipidemia     Hypertension     Non [facility]     [facility]      Past Surgical History:   Procedure Laterality Date    ANKLE SURGERY      CARDIAC CATHETERIZATION      JOINT REPLACEMENT      SHOULDER ARTHROPLASTY       Family History   Problem Relation Age of Onset    Cancer Brother         Lung     Social History     Tobacco Use    Smoking status: Former Smoker     Quit date: 1976     Years since quittin.8    Smokeless tobacco: Never Used   Substance Use Topics    Alcohol use: Not Currently      Current Outpatient Medications   Medication Sig Dispense Refill    levothyroxine (SYNTHROID) 88 MCG tablet Take 88 mcg by mouth Daily      atorvastatin (LIPITOR) 80 MG tablet Take 80 mg by mouth nightly      apixaban (ELIQUIS) 2.5 MG TABS tablet Take 5 mg by mouth 2 times daily      CINNAMON PO Take 4,000 mg by mouth 2 times daily      MULTIPLE VITAMIN PO Take 1 tablet by mouth nightly      Cholecalciferol (VITAMIN D) 50 MCG (2000 UT) CAPS capsule Take by mouth nightly       Vitamin D, Cholecalciferol, 25 MCG (1000 UT) TABS Take 2 tablets by mouth nightly      amLODIPine (NORVASC) 10 MG tablet Take 10 mg by mouth daily      hydroCHLOROthiazide (HYDRODIURIL) 12.5 MG tablet Take 12.5 mg by mouth daily      amiodarone (CORDARONE) 200 MG tablet Take 200 mg by mouth daily      tamsulosin (FLOMAX) 0.4 MG capsule Take 0.4 mg by mouth nightly      olmesartan (BENICAR) 40 MG tablet Take 40 mg by mouth daily      Cyanocobalamin (VITAMIN B 12 PO) Take 5,000 mcg by mouth daily      terbinafine (LAMISIL) 250 MG tablet Take 250 mg by mouth daily      fluticasone (FLONASE) 50 MCG/ACT nasal spray 1 spray by Each Nostril route daily as needed for Rhinitis or Allergies      Multiple Vitamins-Minerals (EYE VITAMINS PO) Take 1 tablet by mouth nightly OcuXanthin OTC supplement      ascorbic acid (VITAMIN C) 500 MG tablet Take 2,000 mg by mouth daily        No current facility-administered medications for this visit. Allergies: Patient has no known allergies. Review of Systems  Review of Systems   Constitutional: Negative for activity change, diaphoresis, fatigue, fever and unexpected weight change. HENT: Negative for facial swelling, nosebleeds and trouble swallowing. Eyes: Negative for discharge, redness and visual disturbance. Respiratory: Negative for cough, shortness of breath and wheezing. Cardiovascular: Negative for chest pain, palpitations and leg swelling. Gastrointestinal: Negative for abdominal distention, abdominal pain, blood in stool, nausea and vomiting. Endocrine: Negative for cold intolerance and heat intolerance. Genitourinary: Negative for dysuria and hematuria. Musculoskeletal: Negative for gait problem. Skin: Negative for color change, pallor and rash. Neurological: Negative for dizziness, syncope, facial asymmetry and light-headedness. Hematological: Does not bruise/bleed easily. Psychiatric/Behavioral: Negative for behavioral problems and confusion. All other systems reviewed and are negative. Objective  Vital Signs - /61   Pulse 63   Ht 6' 1\" (1.854 m)   Wt 197 lb (89.4 kg)   BMI 25.99 kg/m²   Physical Exam  Vitals and nursing note reviewed. Constitutional:       Appearance: He is well-developed. HENT:      Head: Normocephalic and atraumatic. Right Ear: External ear normal.      Left Ear: External ear normal.      Nose: Nose normal.   Eyes:      General:         Right eye: No discharge. Left eye: No discharge. Pupils: Pupils are equal, round, and reactive to light. Neck:      Vascular: No carotid bruit or JVD. Trachea: No tracheal deviation. Cardiovascular:      Rate and Rhythm: Normal rate and regular rhythm. Heart sounds: Normal heart sounds. No murmur heard. No friction rub. No gallop. Pulmonary:      Effort: Pulmonary effort is normal. No respiratory distress. Breath sounds: No wheezing, rhonchi or rales. Abdominal:      General: Bowel sounds are normal. There is no distension. Palpations: Abdomen is soft. Tenderness: There is no abdominal tenderness. Musculoskeletal:         General: Normal range of motion. Cervical back: Normal range of motion. No edema. Skin:     General: Skin is warm and dry. Capillary Refill: Capillary refill takes less than 2 seconds. Findings: No rash. Neurological:      Mental Status: He is alert and oriented to person, place, and time. Psychiatric:         Behavior: Behavior normal.         Judgment: Judgment normal.         Cardiac data:      Assessment, Recommendations, & Plan:  80 y.o. male with      Diagnosis Orders   1. Paroxysmal atrial fibrillation (HCC)     2. Hypertension, unspecified type     3. Hyperlipidemia, unspecified hyperlipidemia type         1. Proximal atrial fibrillation-sinus rhythm today. Continue to monitor. Reports no symptoms of bleeding on anticoagulation. 2.  Hypertension-blood pressure today 121/61. No changes made    3. Hyperlipidemia-continue statin therapy. 4.  Chest pain-1 episode since discharge that started in his stomach. We will request records from [facility]. If pain returns patient will call office. No orders of the defined types were placed in this encounter. Return in about 6 months (around 6/27/2022). Call with any questionsor concerns  Follow up with Pablito Momin MD for non cardiac problems  Report any new problems  Cardiovascular Fitness-Exercise as tolerated. Strive for 15 minutes of exercise most days of the week. Cardiac / HealthyDiet  Continue current medications as directed  Continue plan of treatment  It is always recommended that you bring your medicationsbottles with you to each visit - this is for your safety! Please do not hesitate to contact me for any questions or concerns.     Sincerely yours,    LULA Paniagua    This dictation was generated by voice recognition computer software. Although all attempts are made to edit dictation for accuracy, there may be errors in the transcription that are not intended.

## 2021-12-27 NOTE — PATIENT INSTRUCTIONS
No orders of the defined types were placed in this encounter. Return in about 6 months (around 6/27/2022). Call with any questionsor concerns  Follow up with Cruzito Soliman MD for non cardiac problems  Report any new problems  Cardiovascular Fitness-Exercise as tolerated. Strive for 15 minutes of exercise most days of the week. Cardiac / HealthyDiet  Continue current medications as directed  Continue plan of treatment  It is always recommended that you bring your medicationsbottles with you to each visit - this is for your safety!

## 2022-02-16 RX ORDER — AMIODARONE HYDROCHLORIDE 200 MG/1
200 TABLET ORAL DAILY
Qty: 30 TABLET | Refills: 5 | Status: SHIPPED | OUTPATIENT
Start: 2022-02-16

## 2022-02-16 RX ORDER — OLMESARTAN MEDOXOMIL 40 MG/1
40 TABLET ORAL DAILY
Qty: 30 TABLET | Refills: 5 | Status: SHIPPED | OUTPATIENT
Start: 2022-02-16

## 2022-02-16 RX ORDER — AMLODIPINE BESYLATE 10 MG/1
10 TABLET ORAL DAILY
Qty: 30 TABLET | Refills: 5 | Status: SHIPPED | OUTPATIENT
Start: 2022-02-16

## 2022-02-16 RX ORDER — ATORVASTATIN CALCIUM 80 MG/1
80 TABLET, FILM COATED ORAL NIGHTLY
Qty: 30 TABLET | Refills: 5 | Status: SHIPPED | OUTPATIENT
Start: 2022-02-16 | End: 2022-06-21 | Stop reason: SDUPTHER

## 2022-02-17 ENCOUNTER — TELEPHONE (OUTPATIENT)
Dept: CARDIOLOGY CLINIC | Age: 82
End: 2022-02-17

## 2022-02-17 NOTE — TELEPHONE ENCOUNTER
The [address] called to verify dose of eliquis. It was sent in as 2.5 mg take 2 tablets BID #60. Advised our office has never filled this before but the dose he received in the hospital was 5 mg BID. Advised that he fill it as 5 mg BID. I did attempt to call patient to verify. Will keep trying.

## 2022-02-23 DIAGNOSIS — Z01.818 PRE-OPERATIVE EXAMINATION: Primary | ICD-10-CM

## 2022-02-25 ENCOUNTER — LAB (OUTPATIENT)
Dept: LAB | Facility: HOSPITAL | Age: 82
End: 2022-02-25

## 2022-02-25 DIAGNOSIS — Z01.818 PRE-OPERATIVE EXAMINATION: ICD-10-CM

## 2022-02-25 LAB — SARS-COV-2 ORF1AB RESP QL NAA+PROBE: NOT DETECTED

## 2022-02-25 PROCEDURE — U0005 INFEC AGEN DETEC AMPLI PROBE: HCPCS

## 2022-02-25 PROCEDURE — U0004 COV-19 TEST NON-CDC HGH THRU: HCPCS

## 2022-02-25 PROCEDURE — C9803 HOPD COVID-19 SPEC COLLECT: HCPCS

## 2022-03-01 ENCOUNTER — OFFICE VISIT (OUTPATIENT)
Dept: OTOLARYNGOLOGY | Facility: CLINIC | Age: 82
End: 2022-03-01

## 2022-03-01 VITALS
TEMPERATURE: 97.8 F | BODY MASS INDEX: 26.3 KG/M2 | SYSTOLIC BLOOD PRESSURE: 145 MMHG | HEIGHT: 72 IN | WEIGHT: 194.2 LBS | DIASTOLIC BLOOD PRESSURE: 58 MMHG | HEART RATE: 58 BPM

## 2022-03-01 DIAGNOSIS — J34.2 NASAL SEPTAL DEVIATION: Primary | ICD-10-CM

## 2022-03-01 DIAGNOSIS — R09.81 NASAL CONGESTION: ICD-10-CM

## 2022-03-01 PROCEDURE — 99204 OFFICE O/P NEW MOD 45 MIN: CPT | Performed by: NURSE PRACTITIONER

## 2022-03-01 RX ORDER — TERBINAFINE HYDROCHLORIDE 250 MG/1
TABLET ORAL
COMMUNITY

## 2022-03-01 RX ORDER — LEVOTHYROXINE SODIUM 88 UG/1
88 TABLET ORAL
COMMUNITY

## 2022-03-01 RX ORDER — TAMSULOSIN HYDROCHLORIDE 0.4 MG/1
1 CAPSULE ORAL DAILY
COMMUNITY
Start: 2022-02-26

## 2022-03-01 RX ORDER — PERPHENAZINE 16 MG/1
1 TABLET, FILM COATED ORAL DAILY
COMMUNITY
Start: 2022-01-10

## 2022-03-01 RX ORDER — GLIPIZIDE 2.5 MG/1
TABLET, EXTENDED RELEASE ORAL
Status: ON HOLD | COMMUNITY
End: 2022-06-29

## 2022-03-01 RX ORDER — AZELASTINE 1 MG/ML
2 SPRAY, METERED NASAL 2 TIMES DAILY
Qty: 30 ML | Refills: 11 | Status: SHIPPED | OUTPATIENT
Start: 2022-03-01 | End: 2022-06-02 | Stop reason: SDUPTHER

## 2022-03-01 RX ORDER — OLMESARTAN MEDOXOMIL 40 MG/1
TABLET ORAL
COMMUNITY
Start: 2022-02-16

## 2022-03-01 RX ORDER — ASCORBIC ACID 500 MG
2000 TABLET ORAL
COMMUNITY

## 2022-03-01 RX ORDER — HYDROCHLOROTHIAZIDE 12.5 MG/1
TABLET ORAL
COMMUNITY

## 2022-03-01 RX ORDER — AMLODIPINE BESYLATE 10 MG/1
10 TABLET ORAL DAILY
COMMUNITY
Start: 2022-01-10

## 2022-03-01 RX ORDER — ATORVASTATIN CALCIUM 80 MG/1
TABLET, FILM COATED ORAL
COMMUNITY
Start: 2022-02-16

## 2022-03-01 RX ORDER — MELATONIN
2 NIGHTLY
COMMUNITY

## 2022-03-01 RX ORDER — FUROSEMIDE 20 MG/1
TABLET ORAL
COMMUNITY

## 2022-03-01 RX ORDER — FLUTICASONE PROPIONATE 50 MCG
SPRAY, SUSPENSION (ML) NASAL
COMMUNITY
End: 2022-06-02 | Stop reason: SDUPTHER

## 2022-03-01 RX ORDER — METOPROLOL TARTRATE 50 MG/1
TABLET, FILM COATED ORAL
COMMUNITY

## 2022-03-01 RX ORDER — APIXABAN 5 MG/1
5 TABLET, FILM COATED ORAL 2 TIMES DAILY
COMMUNITY
Start: 2022-02-08

## 2022-03-01 NOTE — PATIENT INSTRUCTIONS
CONTACT INFORMATION:  The main office phone number is [phone]. For emergencies after hours and on weekends, this number will convert over to our answering service and the on call provider will answer. Please try to keep non emergent phone calls/ questions to office hours 9am-5pm Monday through Friday.      [employer]  As an alternative, you can sign up and use the Epic MyChart system for more direct and quicker access for non emergent questions/ problems.  [employer] allows you to send messages to your doctor, view your test results, renew your prescriptions, schedule appointments, and more. To sign up, go to Workbooks and click on the Sign Up Now link in the New User? box. Enter your [employer] Activation Code exactly as it appears below along with the last four digits of your Social Security Number and your Date of Birth () to complete the sign-up process. If you do not sign up before the expiration date, you must request a new code.     [employer] Activation Code: Activation code not generated  Current [employer] Status: Active     If you have questions, you can email [email or web address] or call [phone] to talk to our [employer] staff. Remember, [employer] is NOT to be used for urgent needs. For medical emergencies, dial 911.     IF YOU SMOKE OR USE TOBACCO PLEASE READ THE FOLLOWING:  Why is smoking bad for me?  Smoking increases the risk of heart disease, lung disease, vascular disease, stroke, and cancer. If you smoke, STOP!        IF YOU SMOKE OR USE TOBACCO PLEASE READ THE FOLLOWING:  Why is smoking bad for me?  Smoking increases the risk of heart disease, lung disease, vascular disease, stroke, and cancer. If you smoke, STOP!     For more information:  Quit Now [place]  -QUIT-NOW  https://[place].quitlogix.org/en-US/    For the best response, use your nasal sprays every day without skipping doses. It may take several weeks before the full effect is acheived.   Use  mupirocin

## 2022-03-19 NOTE — PROGRESS NOTES
MEDICAL ONCOLOGY PROGRESS NOTE    Pt Name: Kaylee Tinajero  MRN: [id number]  YOB: 1940  Date of evaluation: 4/11/2022      HISTORY OF PRESENT ILLNESS:    The patient has a diagnosis of mantle cell lymphoma in 2017. He received 6 cycles of bendamustine/rituximab followed by 2 years of rituximab maintenance completed December 2019. He has been in remission. He had CT chest abdomen pelvis another day. He denies any B symptoms. Diagnosis  · B-cell lymphoma, consistent with Mantle cell lymphoma, June 2017  · CD20+  · Translocation 11, 14  · Cyclin D1 positive  · SOX-11 positive    Treatment Summary  · 8/2/17 - 12/19/17 Completion Bendamustine/Rituxan x6 cycles  · 2/16/18 - 12/18/19 Completion Maintenance Rituxan every 2 months    Hematology/Cancer History  Kaylee Tinajero was first seen by me on 12/8/2021. He presents to [facility] of care of a history of mantle cell lymphoma. He was treated in [place], [place]. He received induction chemotherapy with bendamustine/rituximab followed by 2 years of maintenance rituximab. · 12/16/16 US Abdominal ([facility], [place]):  20 cm x 9.7 cm splenomegaly  · 5/19/17 CT chest, abdomen, pelvis ([address], [place]): Splenomegaly is similar to the previous exam. No evidence of adenopathy involving the chest abdomen or pelvis. Small nonspecific granular nodule the right upper lobe. Follow-up imaging to assess stability recommended. · 6/26/17 Bone marrow ([facility], [place], [place]): Mildly hypercellular, 40%. CD20+ B-cell lymphoma consistent with mantle cell lymphoma. B-cell lymphoma involves 45%-50% of marrow cellularity. Trilineage hematopoiesis with adequate megakaryocytes. Iron increased (3+/4+). FLOW: CD20+, CD5+, monoclonal B-cell population. Monoclonal B-cell population is CD5+, CD19+, Bright  CD20+, Lambda+, CD23 dim and involves 17% of events. No increased blast population. KARYOTYPE: 45,X,-Y [4]/46,XY[16].  FISH: IGH/BCL1 translocation was detected. MOLECULAR: IgVh somatic hypermutation analysis failed to detect a prominent clonal B-cell population. COMMENT: The monoclonal B-cell population demonstrates weak staining with Cyclin D1, most suggestive of mantle cell lymphoma. FISH for t(11;14) has been requested for definitive evaluation as mantle cell lymphoma. SOX-11 IHC stain is positive in the lymphocytes, confirming classical  Type (aggressive) mantle cell lymphoma. · [facility]17 [facility] [place] No focal area of abnormal uptake. Splenomegaly. · 7/28/17 MUGA scan [facility] [place] Normal LVEF of 66%. No wall motion abnormalities. · 10/23/17 CT chest, abdomen, pelvis [facility] [place] Decrease in splenomegaly. No lymphadenopathy or other definite suspicious findings. Unchanged tiny bilateral pulmonary nodules, favored to be benign. Attention on follow-up recommended. Colonic diverticulosis. Prostatomegaly. · 8/2/17 - 12/19/17 CompletionTreanda Rituxan x6 cycles  · 2/9/18 CT neck, chest, abdomen, pelvis [facility] [place] No acute abnormality. No significant change in splenomegaly. No  Lymphadenopathy. Redemonstration of incidental findings to include colonic diverticulosis, prostatomegaly, coronary atherosclerotic disease, and tiny likely benign bilateral pulmonary nodules. New mild bilateral lower lobe reticular ground-glass opacities, infectious/inflammatory in appearance. · 2/16/18 - 12/18/19 Completion Maintenance Rituxan every 2 months  · 9/26/18 CT neck, chest, abdomen, pelvis [facility] [place] No CT evidence of malignancy, lymphadenopthay or metastatic disease within the neck, chest, abdomen, pelvis. No significant change in mild splenomegaly. · 11/15/18 MRI C-spine [facility]): Multilevel degenerative disc and facet disease.  Multilevel bilateral neural foraminal narrowing, greatest at C5-C6 where it is severe bilaterally. The vocal cords are opposed, limiting the evaluation of the neck area for a mass. No CT evidence of malignancy, lymphadenopthay or metastatic disease within the neck, chest, abdomen, pelvis. The spleen is now normal size. · 5/9/19 CT neck, chest, abdomen, pelvis ([address], [place]): No CT evidence of malignancy, lymphadenopthay or metastatic disease within the neck, chest, abdomen, pelvis. · 12/17/19 CT neck, chest, abdomen, pelvis ([address], [place]): No CT evidence of malignancy, lymphadenopthay or metastatic disease within the neck, chest, abdomen, pelvis. · 6/9/20 CT chest, abdomen, pelvis ([address], [place]): No CT evidence of malignancy, lymphadenopthay or metastatic disease within the chest, abdomen, pelvis. · 6/9/20  IgG 597.1, IgA 72.9, IgM 58, Kappa 18.32, Lambda 12.67, Kappa/Lambda ratio 1.45  · 10/27/20  IgG 631.1, IgA 95.3, IgM 63, Kappa 18.37, Lambda 13.12, Kappa/Lambda ratio 1.40  · 2/23/21  IgG 596, IgA 83.1, IgM 65,  · 3/23/21 CT chest, abdomen, pelvis ([address], [place]): No CT evidence of malignancy, lymphadenopathy or metastatic disease within the chest, abdomen, pelvis. Extensive coronary artery calcifications. · 6/22/21- IgG 534.6, IgA 91.8, IgM 59, Kappa 19.25, Lambda 16.27, Kappa/Lambda ratio 1.18  · 12/8/2021-she was first seen by me. Recommend continued surveillance. · 4/8/2022 CT Abd/Pelvis W IV Contrast (Oral) No acute abnormality of the abdomen or pelvis, particularly, no evidence of a neoplastic process or metastatic disease. No mass or lymphadenopathy. The stable low density nodules in both kidneys. The nodule in the lower pole of the right kidney medially probably represent a complicated cyst. A follow-up study in 6 months may be obtained to ensure stability. A stable tiny low-density nodule in the uncinate process of thehead of the pancreas.  Another follow-up examination in one year may be obtained for comparison (FLOMAX) 0.4 MG capsule Take 0.4 mg by mouth nightly      Cyanocobalamin (VITAMIN B 12 PO) Take 5,000 mcg by mouth daily      terbinafine (LAMISIL) 250 MG tablet Take 250 mg by mouth daily      fluticasone (FLONASE) 50 MCG/ACT nasal spray 1 spray by Each Nostril route daily as needed for Rhinitis or Allergies      Multiple Vitamins-Minerals (EYE VITAMINS PO) Take 1 tablet by mouth nightly OcuXanthin OTC supplement      ascorbic acid (VITAMIN C) 500 MG tablet Take 2,000 mg by mouth daily        No current facility-administered medications for this visit.      Facility-Administered Medications Ordered in Other Visits   Medication Dose Route Frequency Provider Last Rate Last Admin    sodium chloride flush 0.9 % injection 5-40 mL  5-40 mL IntraVENous PRGUILLERMO Curtis MD        heparin flush 100 UNIT/ML injection 500 Units  500 Units IntraCATHeter PRN Taiwo Curtis MD           Allergies: Not on File      Subjective   REVIEW OF SYSTEMS:   CONSTITUTIONAL: no fever, no night sweats, weakness,fatigue;  HEENT: no blurring of vision, no double vision, no hearing difficulty, no tinnitus, no ulceration, no dysplasia, no epistaxis;   LUNGS: no cough, no hemoptysis, no wheeze,  no shortness of breath;  CARDIOVASCULAR: no palpitation, no chest pain, no shortness of breath;  GI: no abdominal pain, no nausea, no vomiting, no diarrhea, no constipation;  SON: no dysuria, no hematuria, no frequency or urgency, no nephrolithiasis;  MUSCULOSKELETAL:back pain, no joint pain, no swelling, no stiffness;  ENDOCRINE: no polyuria, no polydipsia, no cold or heat intolerance;  HEMATOLOGY: no easy bruising or bleeding, no history of clotting disorder;  DERMATOLOGY: no skin rash, no eczema, no pruritus;  PSYCHIATRY: no depression, no anxiety, no panic attacks, no suicidal ideation, no homicidal ideation;  NEUROLOGY: no syncope, no seizures, no numbness or tingling of hands, numbness or tingling of feet, no paresis; Objective   /63   Pulse 62   Ht 6' 2.4\" (1.89 m)   Wt 198 lb 14.4 oz (90.2 kg)   SpO2 98%   BMI 25.26 kg/m²     PHYSICAL EXAM:  CONSTITUTIONAL: Alert, appropriate, no acute distress  EYES: Non icteric, EOM intact, pupils equal round   ENT: Mucus membranes moist, no oral pharyngeal lesions, external inspection of ears and nose are normal.  NECK: Supple, no masses. No palpable thyroid mass  CHEST/LUNGS: CTA bilaterally, normal respiratory effort   CARDIOVASCULAR: RRR, no murmurs. No lower extremity edema  ABDOMEN: soft non-tender, active bowel sounds, no HSM. No palpable masses  EXTREMITIES: warm, full ROM in all 4 extremities, no focal weakness. SKIN: warm, dry with no rashes or lesions  LYMPH: No cervical, clavicular, axillary, or inguinal lymphadenopathy  NEUROLOGIC: follows commands, non focal   PSYCH: mood and affect appropriate. Alert and oriented to time, place, person    LABORATORY RESULTS REVIEWED/ANALYZED BY ME:  Lab Results   Component Value Date    WBC 4.90 04/11/2022    HGB 12.5 (L) 04/11/2022    HCT 38.7 (L) 04/11/2022    MCV 96.8 (H) 04/11/2022     (L) 04/11/2022       RADIOLOGY STUDIES REVIEWED BY ME:  4/8/2022 CT Abd/Pelvis W IV Contrast (Oral) No acute abnormality of the abdomen or pelvis, particularly, no evidence of a neoplastic process or metastatic disease. No mass or lymphadenopathy. The stable low density nodules in both kidneys. The nodule in the lower pole of the right kidney medially probably represent a complicated cyst. A follow-up study in 6 months may be obtained to ensure stability. A stable tiny low-density nodule in the uncinate process of thehead of the pancreas. Another follow-up examination in one year may be obtained for comparison and ensure stability. Enlarged prostate. 4/8/2022 CT Chest W Contrast Mild chronic lung changes.  No mass or lymphadenopathy is seen within the chest.    ASSESSMENT:    No orders of the defined types were placed in this encounter. Agata Valadez was seen today for follow-up. Diagnoses and all orders for this visit:    Follicular lymphoma, unspecified grade, unspecified body region [facility])    Care plan discussed with patient      Mantle cell lymphoma, SOX11 positive, June 2017  Essentially, status post completion of induction chemotherapy with bendamustine/rituximab followed by 2 years of maintenance rituximab completed December 2019. He is in clinical/radiologic complete response.  -Continue clinical/short-term surveillance  -Repeat CT chest, abdomen, pelvis March 2022- no evidence of lymphoma recurrence  --Repeat CT chest/abdomen-pelvis March 2023  -No evidence of lymphoma recurrence    Environmental exposure-[place]/agent orange. This may have contributed to his diagnosis of lymphoma. More likely than not. A. fib-continue Eliquis. Advised compliance. Skin lesion over right mandibular region-referral to dermatology. Lesion has been present for 6 months.  -Is currently seen by dermatology    Right nare lesion-Continue follow-up with ENT  -Following up with dermatology    Small pancreatic nodule  -Follow-up CT scan 1 year. PLAN:  · RTC with MD 4 months  · Recommend CT Chest, Abdomen, Pelvis in April 2023  · Port Flush today and every 8 weeks  · Continue follow-up with Dr. Ghanshyam Murcia ENT  · Continue follow-up with Dr. Tootie Escobar Dermatology    I, Didi Tello am pre-charting as a registered nurse for Elidia Garrido MD. Electronically signed by Didi Tello RN on 4/11/2022 at 12:55 PM CDT. Deepika Colbert am scribing for Elidia Garrido MD. Electronically signed by Didi Tello RN on 4/11/2022 at 10:55 AM CDT. I, Dr Helio Rodriguez, personally performed the services described in this documentation as scribed by Didi Tello RN in my presence and is both accurate and complete. I have seen, examined and reviewed this patient medication list, appropriate labs and imaging studies.  I reviewed relevant medical records and others physicians notes. I discussed the plans of care with the patient. I answered all the questions to the patients satisfaction. I have also reviewed the chief complaint (CC) and part of the history (History of Present Illness (HPI), Past Family Social History [facility]), or Review of Systems (ROS) and made changes when appropriated.        (Please note that portions of this note were completed with a voice recognition program. Efforts were made to edit the dictations but occasionally words are mis-transcribed.)    Electronically signed by Alyx Maria MD on 4/11/2022 at 11:07 AM

## 2022-04-08 ENCOUNTER — HOSPITAL ENCOUNTER (OUTPATIENT)
Dept: CT IMAGING | Age: 82
Discharge: HOME | End: 2022-04-08
Payer: MEDICARE

## 2022-04-08 DIAGNOSIS — C82.90 FOLLICULAR LYMPHOMA, UNSPECIFIED GRADE, UNSPECIFIED BODY REGION (HCC): ICD-10-CM

## 2022-04-08 DIAGNOSIS — C82.90 FOLLICULAR LYMPHOMA, UNSPECIFIED GRADE, UNSPECIFIED BODY REGION (HCC): Primary | ICD-10-CM

## 2022-04-08 LAB
GFR AFRICAN AMERICAN: >60
GFR NON-AFRICAN AMERICAN: >60
POC CREATININE: 0.8 MG/DL (ref 0.3–1.3)

## 2022-04-08 PROCEDURE — 82565 ASSAY OF CREATININE: CPT

## 2022-04-08 PROCEDURE — 74177 CT ABD & PELVIS W/CONTRAST: CPT

## 2022-04-08 PROCEDURE — 6360000004 HC RX CONTRAST MEDICATION: Performed by: INTERNAL MEDICINE

## 2022-04-08 PROCEDURE — 71260 CT THORAX DX C+: CPT

## 2022-04-08 RX ADMIN — IOPAMIDOL 75 ML: 755 INJECTION, SOLUTION INTRAVENOUS at 10:04

## 2022-04-11 ENCOUNTER — OFFICE VISIT (OUTPATIENT)
Dept: HEMATOLOGY | Age: 82
End: 2022-04-11
Payer: MEDICARE

## 2022-04-11 ENCOUNTER — HOSPITAL ENCOUNTER (OUTPATIENT)
Dept: INFUSION THERAPY | Age: 82
Discharge: HOME | End: 2022-04-11
Payer: MEDICARE

## 2022-04-11 VITALS
WEIGHT: 198.9 LBS | SYSTOLIC BLOOD PRESSURE: 133 MMHG | DIASTOLIC BLOOD PRESSURE: 63 MMHG | BODY MASS INDEX: 25.53 KG/M2 | HEIGHT: 74 IN | HEART RATE: 62 BPM | OXYGEN SATURATION: 98 %

## 2022-04-11 DIAGNOSIS — Z71.89 CARE PLAN DISCUSSED WITH PATIENT: ICD-10-CM

## 2022-04-11 DIAGNOSIS — C85.90 NON-HODGKIN'S LYMPHOMA, UNSPECIFIED BODY REGION, UNSPECIFIED NON-HODGKIN LYMPHOMA TYPE (HCC): ICD-10-CM

## 2022-04-11 DIAGNOSIS — C82.90 FOLLICULAR LYMPHOMA, UNSPECIFIED GRADE, UNSPECIFIED BODY REGION (HCC): Primary | ICD-10-CM

## 2022-04-11 LAB
HCT VFR BLD CALC: 38.7 % (ref 40.1–51)
HEMOGLOBIN: 12.5 G/DL (ref 13.7–17.5)
MCH RBC QN AUTO: 31.3 PG (ref 25.7–32.2)
MCHC RBC AUTO-ENTMCNC: 32.3 G/DL (ref 32.3–36.5)
MCV RBC AUTO: 96.8 FL (ref 79–92.2)
PDW BLD-RTO: 13.4 % (ref 11.6–14.4)
PLATELET # BLD: 119 K/UL (ref 163–337)
PMV BLD AUTO: 9.7 FL (ref 7.4–10.4)
RBC # BLD: 4 M/UL (ref 4.63–6.08)
WBC # BLD: 4.9 K/UL (ref 4.23–9.07)

## 2022-04-11 PROCEDURE — 99212 OFFICE O/P EST SF 10 MIN: CPT

## 2022-04-11 PROCEDURE — 96523 IRRIG DRUG DELIVERY DEVICE: CPT

## 2022-04-11 PROCEDURE — G8427 DOCREV CUR MEDS BY ELIG CLIN: HCPCS | Performed by: INTERNAL MEDICINE

## 2022-04-11 PROCEDURE — G8417 CALC BMI ABV UP PARAM F/U: HCPCS | Performed by: INTERNAL MEDICINE

## 2022-04-11 PROCEDURE — 4040F PNEUMOC VAC/ADMIN/RCVD: CPT | Performed by: INTERNAL MEDICINE

## 2022-04-11 PROCEDURE — 6360000002 HC RX W HCPCS: Performed by: INTERNAL MEDICINE

## 2022-04-11 PROCEDURE — 36415 COLL VENOUS BLD VENIPUNCTURE: CPT

## 2022-04-11 PROCEDURE — 85027 COMPLETE CBC AUTOMATED: CPT

## 2022-04-11 PROCEDURE — 1036F TOBACCO NON-USER: CPT | Performed by: INTERNAL MEDICINE

## 2022-04-11 PROCEDURE — 99213 OFFICE O/P EST LOW 20 MIN: CPT | Performed by: INTERNAL MEDICINE

## 2022-04-11 PROCEDURE — 2580000003 HC RX 258: Performed by: INTERNAL MEDICINE

## 2022-04-11 PROCEDURE — 1123F ACP DISCUSS/DSCN MKR DOCD: CPT | Performed by: INTERNAL MEDICINE

## 2022-04-11 RX ORDER — SODIUM CHLORIDE 9 MG/ML
25 INJECTION, SOLUTION INTRAVENOUS PRN
Status: CANCELLED | OUTPATIENT
Start: 2022-04-11

## 2022-04-11 RX ORDER — HEPARIN SODIUM (PORCINE) LOCK FLUSH IV SOLN 100 UNIT/ML 100 UNIT/ML
500 SOLUTION INTRAVENOUS PRN
Status: CANCELLED | OUTPATIENT
Start: 2022-04-11

## 2022-04-11 RX ORDER — HEPARIN SODIUM (PORCINE) LOCK FLUSH IV SOLN 100 UNIT/ML 100 UNIT/ML
500 SOLUTION INTRAVENOUS PRN
Status: DISCONTINUED | OUTPATIENT
Start: 2022-04-11 | End: 2022-04-12 | Stop reason: HOSPADM

## 2022-04-11 RX ORDER — SODIUM CHLORIDE 0.9 % (FLUSH) 0.9 %
5-40 SYRINGE (ML) INJECTION PRN
Status: DISCONTINUED | OUTPATIENT
Start: 2022-04-11 | End: 2022-04-12 | Stop reason: HOSPADM

## 2022-04-11 RX ORDER — SODIUM CHLORIDE 0.9 % (FLUSH) 0.9 %
5-40 SYRINGE (ML) INJECTION PRN
Status: CANCELLED | OUTPATIENT
Start: 2022-04-11

## 2022-04-11 RX ADMIN — HEPARIN 500 UNITS: 100 SYRINGE at 11:14

## 2022-04-11 RX ADMIN — SODIUM CHLORIDE, PRESERVATIVE FREE 10 ML: 5 INJECTION INTRAVENOUS at 11:14

## 2022-05-10 DIAGNOSIS — Z01.818 PRE-OPERATIVE EXAMINATION: Primary | ICD-10-CM

## 2022-05-16 ENCOUNTER — HOSPITAL ENCOUNTER (OUTPATIENT)
Dept: CT IMAGING | Facility: HOSPITAL | Age: 82
Discharge: HOME | End: 2022-05-16
Admitting: NURSE PRACTITIONER

## 2022-05-16 DIAGNOSIS — J34.2 NASAL SEPTAL DEVIATION: ICD-10-CM

## 2022-05-16 PROCEDURE — 70486 CT MAXILLOFACIAL W/O DYE: CPT

## 2022-05-24 ENCOUNTER — LAB (OUTPATIENT)
Dept: LAB | Facility: HOSPITAL | Age: 82
End: 2022-05-24

## 2022-05-24 DIAGNOSIS — Z01.818 PRE-OPERATIVE EXAMINATION: ICD-10-CM

## 2022-05-24 LAB — SARS-COV-2 ORF1AB RESP QL NAA+PROBE: NOT DETECTED

## 2022-05-24 PROCEDURE — U0005 INFEC AGEN DETEC AMPLI PROBE: HCPCS

## 2022-05-24 PROCEDURE — U0004 COV-19 TEST NON-CDC HGH THRU: HCPCS

## 2022-05-24 PROCEDURE — C9803 HOPD COVID-19 SPEC COLLECT: HCPCS

## 2022-06-02 ENCOUNTER — OFFICE VISIT (OUTPATIENT)
Dept: OTOLARYNGOLOGY | Facility: CLINIC | Age: 82
End: 2022-06-02

## 2022-06-02 VITALS
DIASTOLIC BLOOD PRESSURE: 68 MMHG | SYSTOLIC BLOOD PRESSURE: 169 MMHG | HEIGHT: 72 IN | WEIGHT: 206.6 LBS | BODY MASS INDEX: 27.98 KG/M2 | HEART RATE: 53 BPM | TEMPERATURE: 97.7 F

## 2022-06-02 DIAGNOSIS — D48.9 NEOPLASM OF UNCERTAIN BEHAVIOR: ICD-10-CM

## 2022-06-02 DIAGNOSIS — J34.2 NASAL SEPTAL DEVIATION: ICD-10-CM

## 2022-06-02 DIAGNOSIS — R09.81 NASAL CONGESTION: Primary | ICD-10-CM

## 2022-06-02 DIAGNOSIS — L98.9 LESION OF FACE: ICD-10-CM

## 2022-06-02 DIAGNOSIS — K21.9 LARYNGOPHARYNGEAL REFLUX: ICD-10-CM

## 2022-06-02 DIAGNOSIS — J34.89 CONCHA BULLOSA: ICD-10-CM

## 2022-06-02 PROCEDURE — 11104 PUNCH BX SKIN SINGLE LESION: CPT | Performed by: OTOLARYNGOLOGY

## 2022-06-02 PROCEDURE — 99213 OFFICE O/P EST LOW 20 MIN: CPT | Performed by: NURSE PRACTITIONER

## 2022-06-02 RX ORDER — AZELASTINE 1 MG/ML
2 SPRAY, METERED NASAL 2 TIMES DAILY
Qty: 30 ML | Refills: 11 | Status: SHIPPED | OUTPATIENT
Start: 2022-06-02 | End: 2022-07-11 | Stop reason: SDUPTHER

## 2022-06-02 RX ORDER — FAMOTIDINE 20 MG/1
20 TABLET, FILM COATED ORAL 2 TIMES DAILY
Qty: 60 TABLET | Refills: 3 | Status: SHIPPED | OUTPATIENT
Start: 2022-06-02

## 2022-06-02 RX ORDER — AMIODARONE HYDROCHLORIDE 200 MG/1
200 TABLET ORAL DAILY
COMMUNITY
Start: 2022-04-23

## 2022-06-02 RX ORDER — FLUTICASONE PROPIONATE 50 MCG
2 SPRAY, SUSPENSION (ML) NASAL DAILY
Qty: 18.2 ML | Refills: 3 | Status: SHIPPED | OUTPATIENT
Start: 2022-06-02 | End: 2022-07-11 | Stop reason: SDUPTHER

## 2022-06-02 NOTE — PATIENT INSTRUCTIONS
Call or return for problems  Wound care as instructed; clean 3 times a day with warm soapy water and apply ointment provided  Follow up in 10 days for suture removal       CONTACT INFORMATION:  The main office phone number is [phone]. For emergencies after hours and on weekends, this number will convert over to our answering service and the on call provider will answer. Please try to keep non emergent phone calls/ questions to office hours 9am-5pm Monday through Friday.      [employer]  As an alternative, you can sign up and use the Epic MyChart system for more direct and quicker access for non emergent questions/ problems.  [facility] "[employer] allows you to send messages to your doctor, view your test results, renew your prescriptions, schedule appointments, and more. To sign up, go to EventBoard and click on the Sign Up Now link in the New User? box. Enter your "[employer] Activation Code exactly as it appears below along with the last four digits of your Social Security Number and your Date of Birth () to complete the sign-up process. If you do not sign up before the expiration date, you must request a new code.     "[employer] Activation Code: Activation code not generated  Current [employer] Status: Active     If you have questions, you can email [employer] [email or web address] or call [phone] to talk to our "[employer] staff. Remember, "[employer] is NOT to be used for urgent needs. For medical emergencies, dial 911.     IF YOU SMOKE OR USE TOBACCO PLEASE READ THE FOLLOWING:  Why is smoking bad for me?  Smoking increases the risk of heart disease, lung disease, vascular disease, stroke, and cancer. If you smoke, STOP!        IF YOU SMOKE OR USE TOBACCO PLEASE READ THE FOLLOWING:  Why is smoking bad for me?  Smoking increases the risk of heart disease, lung disease, vascular disease, stroke, and cancer. If you smoke, STOP!     For more information:  Quit Now  [place]  1-800-QUIT-NOW  https://[facility].quitlogix.org/en-US/ Gastroesophageal Reflux Disease (Laryngopharyngeal Reflux), Adult  Gastroesophageal reflux disease (GERD) and/or Laryngopharyngeal Reflux, (LPR) happens when acid from your stomach flows up into the esophagus and/or throat and voicebox or larynx. When acid comes in contact with the these organs, the acid can cause soreness (inflammation). Over time, GERD may create small holes (ulcers) in the lining of the esophagus and may lead to the development of hoarseness, difficulty swallowing,   feeling of something stuck in the throat, increased mucous or drainage and even predispose to the development of malignancies, (cancer).    CAUSES   Increased body weight. This puts pressure on the stomach, making acid rise from the stomach into the esophagus.  Smoking. This increases acid production in the stomach.  Drinking alcohol. This causes decreased pressure in the lower esophageal sphincter (valve or ring of muscle between the esophagus and stomach), allowing acid from the stomach into the esophagus.  Late evening meals and a full stomach. This increases pressure and acid production in the stomach.  A malformed lower esophageal sphincter  Diet which can include avoidance of gluten and dairy products  Age  SYMPTOMS   Burning pain in the lower part of the mid-chest behind the breastbone and in the mid-stomach area. This may occur twice a week or more often.  Trouble swallowing.  Sore throat.  Dry cough.  Asthma-like symptoms including chest tightness, shortness of breath, or wheezing.  Globus sensation-something stuck in the throat/fullness  Hoarseness  DIAGNOSIS   Your caregiver may be able to diagnose GERD based on your symptoms. In some cases, X-rays and other tests may be done to check for complications or to check the condition of your stomach and esophagus.  You may need to see another doctor.  TREATMENT   Over-the-counter or prescription medicines to help  decrease acid production.   Dietary and behavioral modifications or changes may be also recommended.  HOME CARE INSTRUCTIONS   Change the factors that you can control. Ask your caregiver for guidance concerning weight loss, quitting smoking, and alcohol consumption.  Avoid foods and drinks that make your symptoms worse, and MAY include such as:  Caffeine or alcoholic drinks.  Chocolate.  Gluten containing foods  Dairy  Peppermint or mint flavorings.  Garlic and onions.  Spicy foods.  Citrus fruits, such as oranges, jerson, or limes.  Tomato-based foods such as sauce, chili, salsa, and pizza.  Fried and fatty foods.  Avoid lying down for the 3 hours prior to your bedtime or prior to taking a nap.  Eat small, frequent meals instead of large meals.  Wear loose-fitting clothing. Do not wear anything tight around your waist that causes pressure on your stomach.  Raise the head of your bed 6 to 8 inches with wood blocks to help you sleep. Extra pillows will not help.  Only take over-the-counter or prescription medicines for pain, discomfort, or fever as directed by your caregiver.  Do not take aspirin, ibuprofen, or other nonsteroidal anti-inflammatory drugs if possible (NSAIDs).  SEEK IMMEDIATE MEDICAL CARE IF:   You have pain in your arms, neck, jaw, teeth, or back.  Your pain increases or changes in intensity or duration.  You develop nausea, vomiting, or sweating (diaphoresis).  You develop shortness of breath, or you faint.  Your vomit is green, yellow, black, or looks like coffee grounds or blood.  Your stool is red, bloody, or black.  These symptoms could be signs of other problems, such as heart disease, gastric bleeding, or esophageal bleeding.  MAKE SURE YOU:   Understand these instructions.  Will watch your condition.  Will get help right away if you are not doing well or get worse.     This information is not intended to replace advice given to you by your physician. Make sure you discuss any questions you  have with your health care provider.     Modified by Ky Godoy MD, FACS 9/8/2016.  Document Released: 09/27/2006 Document Revised: 01/08/2016 Document Reviewed: 04/13/2016  [employer] Patient Education ©2016 [employer]. For the best response, use your nasal sprays every day without skipping doses. It may take several weeks before the full effect is acheived.  Call or return for problems  Wound care as instructed; clean 3 times a day with warm soapy water and apply ointment provided  Follow up in 10 days for suture removal

## 2022-06-02 NOTE — PROGRESS NOTES
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 The epidermis was re-approximated with interrupted 5-0 nylon.  The specimen was submitted for permanent pathologic examination.  The patient tolerated the procedure well with no complications.

## 2022-06-09 ENCOUNTER — TELEPHONE (OUTPATIENT)
Dept: OTOLARYNGOLOGY | Facility: CLINIC | Age: 82
End: 2022-06-09

## 2022-06-10 DIAGNOSIS — C44.319 BASAL CELL CARCINOMA (BCC) OF SKIN OF OTHER PART OF FACE: ICD-10-CM

## 2022-06-10 DIAGNOSIS — D48.9 NEOPLASM OF UNCERTAIN BEHAVIOR: Primary | ICD-10-CM

## 2022-06-10 DIAGNOSIS — R79.1 ABNORMAL COAGULATION PROFILE: ICD-10-CM

## 2022-06-10 DIAGNOSIS — R94.5 ABNORMAL RESULTS OF LIVER FUNCTION STUDIES: ICD-10-CM

## 2022-06-13 ENCOUNTER — TELEPHONE (OUTPATIENT)
Dept: OTOLARYNGOLOGY | Facility: CLINIC | Age: 82
End: 2022-06-13

## 2022-06-13 ENCOUNTER — TELEPHONE (OUTPATIENT)
Dept: HEMATOLOGY | Age: 82
End: 2022-06-13

## 2022-06-13 PROBLEM — C44.91 BASAL CELL CARCINOMA: Status: ACTIVE | Noted: 2022-06-13

## 2022-06-13 PROBLEM — D48.9 NEOPLASM OF UNCERTAIN BEHAVIOR: Status: ACTIVE | Noted: 2022-06-13

## 2022-06-13 NOTE — TELEPHONE ENCOUNTER
----- Message from Debora Singletary RN sent at 6/10/2022 10:58 AM CDT -----  Clearance with dr simpson for ezequiel

## 2022-06-13 NOTE — TELEPHONE ENCOUNTER
Pt of Dr Celia Paget: Attempted to contact pt due to no show for port flush today. Pt did not answer and did not have voicemail.

## 2022-06-21 ENCOUNTER — OFFICE VISIT (OUTPATIENT)
Dept: CARDIOLOGY CLINIC | Age: 82
End: 2022-06-21
Payer: MEDICARE

## 2022-06-21 VITALS
WEIGHT: 204 LBS | OXYGEN SATURATION: 97 % | SYSTOLIC BLOOD PRESSURE: 138 MMHG | BODY MASS INDEX: 27.63 KG/M2 | DIASTOLIC BLOOD PRESSURE: 60 MMHG | HEIGHT: 72 IN | HEART RATE: 55 BPM

## 2022-06-21 DIAGNOSIS — I10 HYPERTENSION, UNSPECIFIED TYPE: ICD-10-CM

## 2022-06-21 DIAGNOSIS — E78.5 HYPERLIPIDEMIA, UNSPECIFIED HYPERLIPIDEMIA TYPE: ICD-10-CM

## 2022-06-21 DIAGNOSIS — Z79.01 CHRONIC ANTICOAGULATION: ICD-10-CM

## 2022-06-21 DIAGNOSIS — Z79.899 ON AMIODARONE THERAPY: Primary | ICD-10-CM

## 2022-06-21 DIAGNOSIS — I48.0 PAROXYSMAL ATRIAL FIBRILLATION (HCC): ICD-10-CM

## 2022-06-21 PROCEDURE — 93000 ELECTROCARDIOGRAM COMPLETE: CPT | Performed by: NURSE PRACTITIONER

## 2022-06-21 PROCEDURE — 1123F ACP DISCUSS/DSCN MKR DOCD: CPT | Performed by: NURSE PRACTITIONER

## 2022-06-21 PROCEDURE — G8427 DOCREV CUR MEDS BY ELIG CLIN: HCPCS | Performed by: NURSE PRACTITIONER

## 2022-06-21 PROCEDURE — 99214 OFFICE O/P EST MOD 30 MIN: CPT | Performed by: NURSE PRACTITIONER

## 2022-06-21 PROCEDURE — 1036F TOBACCO NON-USER: CPT | Performed by: NURSE PRACTITIONER

## 2022-06-21 PROCEDURE — G8417 CALC BMI ABV UP PARAM F/U: HCPCS | Performed by: NURSE PRACTITIONER

## 2022-06-21 RX ORDER — ATORVASTATIN CALCIUM 80 MG/1
80 TABLET, FILM COATED ORAL NIGHTLY
Qty: 30 TABLET | Refills: 1 | Status: SHIPPED | OUTPATIENT
Start: 2022-06-21

## 2022-06-21 NOTE — PATIENT INSTRUCTIONS
Get chest x-ray in August  Schedule Eye Exam       Atrial Fibrillation     Definition   Atrial fibrillation is an abnormal heart rhythm. The heart's electrical system normally sends regularly spaced signals telling the heart muscle to beat. The heart has two upper chambers, called atria, and two lower chambers, called ventricles. Each signal starts in the atria and travels to the rest of the heart. In atrial fibrillation, the electrical signals from the atria are fast and irregular. The atria quiver, rather than contract. Some signals do not reach the ventricles and the ventricles continue pumping, usually irregularly and sometimes rapidly. This uncoordinated rhythm can reduce the heart's efficiency at pumping blood out to the body. Blood left in the heart chambers can form clots. These clots may sometimes break away, travel to the brain, and cause a stroke . Atrial Fibrillation        [address] [place] [address].   Causes   In most cases, atrial fibrillation is due to an existing heart condition. But atrial fibrillation can occur in people with no structural heart problems. A thyroid disorder or other condition may cause the abnormal rhythm. In some cases, the cause is unknown.    Risk Factors   Risk factors include:   · Cardiovascular diseases:   ¨ High blood pressure   ¨ Coronary artery disease   ¨ Congestive heart failure   ¨ Heart attack   ¨ Heart valve disease   ¨ Endocarditis (infection of a heart valve)   ¨ Cardiomyopathy (disease of the heart muscle)   ¨ Congenital heart disease   ¨ Prior episode of atrial fibrillation   · Lung diseases:   ¨ Emphysema   ¨ Asthma   ¨ Blood clots in the lungs   · Age: 54 or older   · Smoking   · Chronic conditions:   ¨ Overactive thyroid   ¨ Diabetes   · Excessive alcohol intake   · Use of stimulant drugs, including caffeine   · Sex: male   · Undergoing general anesthesia   · Stress, either emotional or physical   · Family history of atrial fibrillation     Symptoms Symptoms can vary from mild to severe, depending on your heart function and overall health. Some people may not notice any symptoms. Symptoms include:   · Irregular or rapid pulse or heart beat   · Racing feeling in the chest   · Palpitations, or a pounding feeling in the chest   · Dizziness, lightheadedness, or fainting   · Sweating   · Pain or pressure in the chest   · Shortness of breath   · Fatigue or weakness   · Exercise intolerance     Treatment   The goals of treatment are to:   · Restore a regular rhythm, if possible   · Keep heart rate as close to normal   ¨ Your doctor will tell you what your target heart rate is. In general, the your resting rate should be between 60-80 beats per minute, and  beats per minute during moderate exercise. · Prevent blood clots from forming   If an underlying cause of atrial fibrillation is found, it may be treated. Some patients return to a normal rhythm without treatment. Treatments include:   Medication   · Drugs to slow the heart rate, such as:   ¨ Digitalis   ¨ Verapamil   ¨ Diltiazem   ¨ Metoprolol   ¨ Atenolol   · Drugs to keep the heart in a regular rhythm, such as:   ¨ Sotalol (Betapace)  ¨ Propafenone (Rythmol)  ¨ Amiodarone   ¨ Multaq  · Drugs to prevent clot formation, such as warfarin (Coumadin) or Pradaxa    If you are started on a blood thinners, you will need at least 6 weeks prior to trying to get your heart back into a normal rhythm. If you are on Coumadin, you will need weekly blood checks to monitor your INR. You will need to keep your INR between 2.0-2.5 or 2.0 and 3.0 as your doctor's directions. Cardioversion   Cardioversion is a procedure that uses an electrical current or drugs to help normalize the heart rhythm. You will be in the hospital approximately 2-3 days to start antiarrhythmics (to keep your heart in rhythm) and shock to get you back in a normal rhythm.

## 2022-06-21 NOTE — PROGRESS NOTES
Dear Dr. Margaux Mathur MD,    Thank you for allowing me to participate in the care of Mr. Khai Cuenca. He presents today at the [address] [place] [address]. As you know, Mr. Thierno Kumari is a 80 y.o. male with history of hypertension, hyperlipidemia, diabetes, non-Hodgkin's lymphoma and PAF who presents with the chief complaint of follow-up for chronic cardiac conditions. He is a patient of Dr. Jordana Verma. He is active at home. He denies any exertional chest pain or shortness of breath. He denies any fast or slow heart rhythms. He denies any bleeding issues on the Eliquis. he is sleeping on 1 pillow at night. he is not waking gasping for air. he denies any swelling. he has been compliant with his medications. his BP has been controlled. PCP follows labs and lipids. He otherwise denies chest pain, SOA, MAY, PND, orthopnea, syncope or near syncope. He has no other complaints. Review of Systems   Constitutional: Negative for fever, chills, diaphoresis, activity change, appetite change, fatigue and unexpected weight change. Eyes: Negative for photophobia, pain, redness and visual disturbance. Respiratory: Negative for apnea, cough, chest tightness, shortness of breath, wheezing and stridor. Cardiovascular: Negative for chest pain, palpitations and leg swelling. Gastrointestinal: Negative for abdominal distention. Genitourinary: Negative for dysuria, urgency and frequency. Musculoskeletal: Negative for myalgias, arthralgias and gait problem. Skin: Negative for color change, pallor, rash and wound. Neurological: Negative for dizziness, tremors, speech difficulty, weakness and numbness. Hematological: Does not bruise/bleed easily. Psychiatric/Behavioral: Negative.         Past Medical History:   Diagnosis Date    Agent orange exposure     [facility]     Diabetes mellitus [facility]     Hyperlipidemia     Hypertension     Non Hodgkin's lymphoma ([facility]     Non-Hodgkin lymphoma [facility]     Skin cancer     right low cheek       Past Surgical History:   Procedure Laterality Date    ANKLE SURGERY      CARDIAC CATHETERIZATION      JOINT REPLACEMENT      SHOULDER ARTHROPLASTY         Family History   Problem Relation Age of Onset    Cancer Brother         Lung       Social History     Socioeconomic History    Marital status:      Spouse name: Not on file    Number of children: Not on file    Years of education: Not on file    Highest education level: Not on file   Occupational History    Not on file   Tobacco Use    Smoking status: Former Smoker     Quit date: 1976     Years since quittin.3    Smokeless tobacco: Never Used   Vaping Use    Vaping Use: Never used   Substance and Sexual Activity    Alcohol use: Not Currently    Drug use: Never    Sexual activity: Not on file   Other Topics Concern    Not on file   Social History Narrative    Not on file     Social Determinants of Health     Financial Resource Strain:     Difficulty of Paying Living Expenses: Not on file   Food Insecurity:     Worried About [address] in the Last Year: Not on file    [address] in the Last Year: Not on file   Transportation Needs:     Lack of Transportation (Medical): Not on file    Lack of Transportation (Non-Medical):  Not on file   Physical Activity:     Days of Exercise per Week: Not on file    Minutes of Exercise per Session: Not on file   Stress:     Feeling of Stress : Not on file   Social Connections:     Frequency of Communication with Friends and Family: Not on file    Frequency of Social Gatherings with Friends and Family: Not on file    Attends Spiritism Services: Not on file    Active Member of Clubs or Organizations: Not on file    Attends Club or Organization Meetings: Not on file    Marital Status: Not on file   Intimate Partner Violence:     Fear of Current or Ex-Partner: Not on file    Emotionally Abused: Not on file    Physically Abused: Not on file    Sexually Abused: Not on file   Housing Stability:     Unable to Pay for Housing in the Last Year: Not on file    Number of Places Lived in the Last Year: Not on file    Unstable Housing in the Last Year: Not on file       No Known Allergies      Current Outpatient Medications:     mupirocin (BACTROBAN) 2 % ointment, Apply topically 3 times daily Apply topically 3 times daily. , Disp: , Rfl:     amLODIPine (NORVASC) 10 MG tablet, Take 1 tablet by mouth daily, Disp: 30 tablet, Rfl: 5    amiodarone (CORDARONE) 200 MG tablet, Take 1 tablet by mouth daily, Disp: 30 tablet, Rfl: 5    apixaban (ELIQUIS) 2.5 MG TABS tablet, Take 2 tablets by mouth 2 times daily, Disp: 60 tablet, Rfl: 5    atorvastatin (LIPITOR) 80 MG tablet, Take 1 tablet by mouth nightly, Disp: 30 tablet, Rfl: 5    olmesartan (BENICAR) 40 MG tablet, Take 1 tablet by mouth daily, Disp: 30 tablet, Rfl: 5    levothyroxine (SYNTHROID) 88 MCG tablet, Take 88 mcg by mouth Daily, Disp: , Rfl:     CINNAMON PO, Take 4,000 mg by mouth 2 times daily, Disp: , Rfl:     MULTIPLE VITAMIN PO, Take 1 tablet by mouth nightly, Disp: , Rfl:     Cholecalciferol (VITAMIN D) 50 MCG (2000 UT) CAPS capsule, Take by mouth nightly , Disp: , Rfl:     Vitamin D, Cholecalciferol, 25 MCG (1000 UT) TABS, Take 2 tablets by mouth nightly, Disp: , Rfl:     hydroCHLOROthiazide (HYDRODIURIL) 12.5 MG tablet, Take 12.5 mg by mouth daily, Disp: , Rfl:     tamsulosin (FLOMAX) 0.4 MG capsule, Take 0.4 mg by mouth nightly, Disp: , Rfl:     Cyanocobalamin (VITAMIN B 12 PO), Take 5,000 mcg by mouth daily, Disp: , Rfl:     fluticasone (FLONASE) 50 MCG/ACT nasal spray, 1 spray by Each Nostril route daily as needed for Rhinitis or Allergies, Disp: , Rfl:     Multiple Vitamins-Minerals (EYE VITAMINS PO), Take 1 tablet by mouth nightly OcuXanthin OTC supplement, Disp: , Rfl:     ascorbic acid (VITAMIN C) 500 MG tablet, Take 2,000 mg by mouth daily , Disp: , Rfl:   terbinafine (LAMISIL) 250 MG tablet, Take 250 mg by mouth daily (Patient not taking: Reported on 6/21/2022), Disp: , Rfl:     PE:  Vitals:    06/21/22 0800   BP: 138/60   Pulse: 55   SpO2: 97%       Estimated body mass index is 27.67 kg/m² as calculated from the following:    Height as of this encounter: 6' (1.829 m). Weight as of this encounter: 204 lb (92.5 kg). Constitutional: He is oriented to person, place, and time. He appears well-developed and well-nourished in no acute distress. Neck:  Neck supple without JVD present. No trachea deviation present. No thyromegaly present. Eyes:Conjunctivae and EOM are normal. Pupils equal and reactive to light. ENT:Hearing appears normal.conjunctiva and lids are normal, ears and nose appear normal.  Cardiovascular: Normal rate, NormalN rhythm, normal heart sounds. No murmur ascultated. No gallop and no friction rub. No carotid bruits. No peripheral edema. Pulmonary/Chest:  Lungs clear to auscultation bilaterally without evidence of respiratory distress. He without wheezes. He without rales or ronchi. Musculoskeletal: Normal range of motion. Gait is normal.  Head is normocephalic and atraumatic. Skin: Skin is warm and dry without rash or pallor. Psychiatric:He is alert and oriented to person, place, and time. He has a normal mood and affect. His behavior is normal. Thought content normal.       Lab Results   Component Value Date    CREATININE 0.8 04/08/2022    CREATININE 1.1 12/08/2021    CREATININE 1.0 08/10/2021    CREATININE 1.1 08/09/2021    HGB 12.5 04/11/2022    HGB 12.2 12/08/2021    HGB 11.7 08/10/2021    PROBNP 78 08/08/2021         ECG 06/21/22  Sinus Bradycardia, QTc 480 msec, HR 55 bpm    1. Blood work: (done every 6 months) Last CMP Last week next due 6 months. Last thyroid levels Last week next due 6 months. 2. Chest X-ray: (done annually)  Last 8/9/21 next due 8/9/22.      3. Pulmonary Function Study: ( done annually) Last ? next due Now.     4. Eye exam: (done annually) Last 1.5 years ago next due Now.     5. EKG: QTc: 480 msec            Assessment, Recommendations, & Plan:  80 y.o. male with PAF, Chronic anticoagulation, Amiodarone Therapy, HTN, & HLD    PAF/Chronic anticoagulation/Amiodarone therapy-In sinus rhythm on Amiodarone. Anticoagulated on Eliquis without bleeding issues. He had lab work done with PCP last week. We will have him fax records so we can see what CMP and thyroid levels are. He is due for an eye exam patient is going to schedule this. He will have a repeat chest x-ray done in August and we will schedule PFTs. Continue current regimen    HTN-controlled on Norvasc, hydrochlorothiazide, & Benicar. Continue current regimen    HLD-PCP, on Lipitor. He had fasting lab work done last week. We will have him sign a medical release form to get most recent labs from PCP faxed to us. Disposition - RTC in 6 months with Dr. Giselle Vasquez or sooner if needed      Please do not hesitate to contact me for any questions or concerns.     Sincerely yours,    LULA Herrera

## 2022-06-27 ENCOUNTER — PRE-ADMISSION TESTING (OUTPATIENT)
Dept: PREADMISSION TESTING | Facility: HOSPITAL | Age: 82
End: 2022-06-27

## 2022-06-27 ENCOUNTER — HOSPITAL ENCOUNTER (OUTPATIENT)
Dept: GENERAL RADIOLOGY | Facility: HOSPITAL | Age: 82
Discharge: HOME | End: 2022-06-27

## 2022-06-27 ENCOUNTER — LAB (OUTPATIENT)
Dept: LAB | Facility: HOSPITAL | Age: 82
End: 2022-06-27

## 2022-06-27 VITALS
HEIGHT: 72 IN | SYSTOLIC BLOOD PRESSURE: 154 MMHG | OXYGEN SATURATION: 99 % | RESPIRATION RATE: 18 BRPM | HEART RATE: 58 BPM | WEIGHT: 204.37 LBS | BODY MASS INDEX: 27.68 KG/M2 | DIASTOLIC BLOOD PRESSURE: 81 MMHG

## 2022-06-27 DIAGNOSIS — R79.1 ABNORMAL COAGULATION PROFILE: ICD-10-CM

## 2022-06-27 DIAGNOSIS — R94.5 ABNORMAL RESULTS OF LIVER FUNCTION STUDIES: ICD-10-CM

## 2022-06-27 DIAGNOSIS — D48.9 NEOPLASM OF UNCERTAIN BEHAVIOR: ICD-10-CM

## 2022-06-27 DIAGNOSIS — C44.319 BASAL CELL CARCINOMA (BCC) OF SKIN OF OTHER PART OF FACE: ICD-10-CM

## 2022-06-27 LAB
ALBUMIN SERPL-MCNC: 4.1 G/DL (ref 3.5–5.2)
ALBUMIN/GLOB SERPL: 2 G/DL
ALP SERPL-CCNC: 167 U/L (ref 39–117)
ALT SERPL W P-5'-P-CCNC: 47 U/L (ref 1–41)
ANION GAP SERPL CALCULATED.3IONS-SCNC: 7 MMOL/L (ref 5–15)
APTT PPP: 26.3 SECONDS (ref 24.1–35)
AST SERPL-CCNC: 33 U/L (ref 1–40)
BILIRUB SERPL-MCNC: 0.6 MG/DL (ref 0–1.2)
BUN SERPL-MCNC: 15 MG/DL (ref 8–23)
BUN/CREAT SERPL: 16.9 (ref 7–25)
CALCIUM SPEC-SCNC: 9.6 MG/DL (ref 8.6–10.5)
CHLORIDE SERPL-SCNC: 106 MMOL/L (ref 98–107)
CO2 SERPL-SCNC: 29 MMOL/L (ref 22–29)
CREAT SERPL-MCNC: 0.89 MG/DL (ref 0.76–1.27)
DEPRECATED RDW RBC AUTO: 50.4 FL (ref 37–54)
EGFRCR SERPLBLD CKD-EPI 2021: 85.6 ML/MIN/1.73
ERYTHROCYTE [DISTWIDTH] IN BLOOD BY AUTOMATED COUNT: 14.6 % (ref 12.3–15.4)
GLOBULIN UR ELPH-MCNC: 2.1 GM/DL
GLUCOSE SERPL-MCNC: 169 MG/DL (ref 65–99)
HCT VFR BLD AUTO: 32.7 % (ref 37.5–51)
HGB BLD-MCNC: 10.6 G/DL (ref 13–17.7)
INR PPP: 1.01 (ref 0.91–1.09)
MCH RBC QN AUTO: 30.9 PG (ref 26.6–33)
MCHC RBC AUTO-ENTMCNC: 32.4 G/DL (ref 31.5–35.7)
MCV RBC AUTO: 95.3 FL (ref 79–97)
PLATELET # BLD AUTO: 128 10*3/MM3 (ref 140–450)
PMV BLD AUTO: 10 FL (ref 6–12)
POTASSIUM SERPL-SCNC: 4.1 MMOL/L (ref 3.5–5.2)
PROT SERPL-MCNC: 6.2 G/DL (ref 6–8.5)
PROTHROMBIN TIME: 12.9 SECONDS (ref 11.9–14.6)
RBC # BLD AUTO: 3.43 10*6/MM3 (ref 4.14–5.8)
SARS-COV-2 ORF1AB RESP QL NAA+PROBE: NOT DETECTED
SODIUM SERPL-SCNC: 142 MMOL/L (ref 136–145)
WBC NRBC COR # BLD: 2.6 10*3/MM3 (ref 3.4–10.8)

## 2022-06-27 PROCEDURE — U0005 INFEC AGEN DETEC AMPLI PROBE: HCPCS

## 2022-06-27 PROCEDURE — 80053 COMPREHEN METABOLIC PANEL: CPT | Performed by: NURSE PRACTITIONER

## 2022-06-27 PROCEDURE — 71046 X-RAY EXAM CHEST 2 VIEWS: CPT

## 2022-06-27 PROCEDURE — 85027 COMPLETE CBC AUTOMATED: CPT | Performed by: NURSE PRACTITIONER

## 2022-06-27 PROCEDURE — 85610 PROTHROMBIN TIME: CPT | Performed by: NURSE PRACTITIONER

## 2022-06-27 PROCEDURE — C9803 HOPD COVID-19 SPEC COLLECT: HCPCS

## 2022-06-27 PROCEDURE — U0004 COV-19 TEST NON-CDC HGH THRU: HCPCS

## 2022-06-27 PROCEDURE — 93005 ELECTROCARDIOGRAM TRACING: CPT | Performed by: NURSE PRACTITIONER

## 2022-06-27 PROCEDURE — 93010 ELECTROCARDIOGRAM REPORT: CPT | Performed by: EMERGENCY MEDICINE

## 2022-06-27 PROCEDURE — 85730 THROMBOPLASTIN TIME PARTIAL: CPT | Performed by: NURSE PRACTITIONER

## 2022-06-27 NOTE — DISCHARGE INSTRUCTIONS
Before you come to the hospital        Arrival time: AS DIRECTED BY OFFICE     YOU MAY TAKE THE FOLLOWING MEDICATION(S) THE MORNING OF SURGERY WITH A SIP OF WATER: ***metoprolol           ALL OTHER HOME MEDICATION CHECK WITH YOUR PHYSICIAN (especially if you are taking diabetes medicines or blood thinners)    Do not take any Erectile Dysfunction medications (EX: CIALIS, VIAGRA) 24 hours prior to surgery      If you were given and instructed to use a germ- killing soap, use as directed the night before surgery and the morning of surgery before coming to the hospital.             Eating and drinking restrictions prior to scheduled arrival time    2 Hours before arrival time STOP   Drinking Clear liquids (water, apple juice-no pulp)     6 Hours before arrival time STOP   Milk or drinks that contain milk, full liquids    6 Hours before arrival time STOP   Light meals or foods, such as toast or cereal    8 Hours before arrival time STOP   Heavy foods, such as meat, fried foods, or fatty foods    (It is extremely important that you follow these guidelines to prevent delay or cancelation of your procedure)     Clear Liquids  Water and flavored water                                                                      Clear Fruit juices, such as cranberry juice and apple juice.  Black coffee (NO cream of any kind, including powdered).  Plain tea  Clear bouillon or broth.  Flavored gelatin.  Soda.  Gatorade or Powerade.  Full liquid examples  Juices that have pulp.  Frozen ice pops that contain fruit pieces.  Coffee with creamer  Milk.  Yogurt.              MANAGING PAIN AFTER SURGERY    We know you are probably wondering what your pain will be like after surgery.  Following surgery it is unrealistic to expect you will not have pain.   Pain is how our bodies let us know that something is wrong or cautions us to be careful.  That said, our goal is to make your pain tolerable.    Methods we may use to treat your pain include  (oral or IV medications, PCAs, epidurals, nerve blocks, etc.)   While some procedures require IV pain medications for a short time after surgery, transitioning to pain medications by mouth allows for better management of pain.   Your nurse will encourage you to take oral pain medications whenever possible.  IV medications work almost immediately, but only last a short while.  Taking medications by mouth allows for a more constant level of medication in your blood stream for a longer period of time.      Once your pain is out of control it is harder to get back under control.  It is important you are aware when your next dose of pain medication is due.  If you are admitted, your nurse may write the time of your next dose on the white board in your room to help you remember.      We are interested in your pain and encourage you to inform us about aggravating factors during your visit.   Many times a simple repositioning every few hours can make a big difference.    If your physician says it is okay, do not let your pain prevent you from getting out of bed. Be sure to call your nurse for assistance prior to getting up so you do not fall.      Before surgery, please decide your tolerable pain goal.  These faces help describe the pain ratings we use on a 0-10 scale.   Be prepared to tell us your goal and whether or not you take pain or anxiety medications at home.

## 2022-06-28 LAB
QT INTERVAL: 464 MS
QTC INTERVAL: 440 MS

## 2022-06-29 ENCOUNTER — ANESTHESIA (OUTPATIENT)
Dept: PERIOP | Facility: HOSPITAL | Age: 82
End: 2022-06-29

## 2022-06-29 ENCOUNTER — HOSPITAL ENCOUNTER (OUTPATIENT)
Facility: HOSPITAL | Age: 82
Setting detail: HOSPITAL OUTPATIENT SURGERY
Discharge: HOME | End: 2022-06-29
Attending: OTOLARYNGOLOGY | Admitting: OTOLARYNGOLOGY

## 2022-06-29 ENCOUNTER — ANESTHESIA EVENT (OUTPATIENT)
Dept: PERIOP | Facility: HOSPITAL | Age: 82
End: 2022-06-29

## 2022-06-29 VITALS
OXYGEN SATURATION: 99 % | SYSTOLIC BLOOD PRESSURE: 162 MMHG | RESPIRATION RATE: 16 BRPM | HEART RATE: 49 BPM | DIASTOLIC BLOOD PRESSURE: 60 MMHG | TEMPERATURE: 97.6 F

## 2022-06-29 DIAGNOSIS — D48.9 NEOPLASM OF UNCERTAIN BEHAVIOR: ICD-10-CM

## 2022-06-29 DIAGNOSIS — C44.319 BASAL CELL CARCINOMA (BCC) OF SKIN OF OTHER PART OF FACE: ICD-10-CM

## 2022-06-29 DIAGNOSIS — C44.91 BASAL CELL CARCINOMA (BCC), UNSPECIFIED SITE: Primary | ICD-10-CM

## 2022-06-29 LAB — GLUCOSE BLDC GLUCOMTR-MCNC: 152 MG/DL (ref 70–130)

## 2022-06-29 PROCEDURE — 88305 TISSUE EXAM BY PATHOLOGIST: CPT | Performed by: OTOLARYNGOLOGY

## 2022-06-29 PROCEDURE — 88331 PATH CONSLTJ SURG 1 BLK 1SPC: CPT | Performed by: OTOLARYNGOLOGY

## 2022-06-29 PROCEDURE — 82962 GLUCOSE BLOOD TEST: CPT

## 2022-06-29 PROCEDURE — 25010000002 PROPOFOL 10 MG/ML EMULSION

## 2022-06-29 PROCEDURE — 13132 CMPLX RPR F/C/C/M/N/AX/G/H/F: CPT | Performed by: OTOLARYNGOLOGY

## 2022-06-29 PROCEDURE — 11643 EXC F/E/E/N/L MAL+MRG 2.1-3: CPT | Performed by: OTOLARYNGOLOGY

## 2022-06-29 RX ORDER — LABETALOL HYDROCHLORIDE 5 MG/ML
5 INJECTION, SOLUTION INTRAVENOUS
Status: DISCONTINUED | OUTPATIENT
Start: 2022-06-29 | End: 2022-06-29 | Stop reason: HOSPADM

## 2022-06-29 RX ORDER — ONDANSETRON 2 MG/ML
4 INJECTION INTRAMUSCULAR; INTRAVENOUS ONCE AS NEEDED
Status: DISCONTINUED | OUTPATIENT
Start: 2022-06-29 | End: 2022-06-29 | Stop reason: HOSPADM

## 2022-06-29 RX ORDER — NALOXONE HCL 0.4 MG/ML
0.4 VIAL (ML) INJECTION AS NEEDED
Status: DISCONTINUED | OUTPATIENT
Start: 2022-06-29 | End: 2022-06-29 | Stop reason: HOSPADM

## 2022-06-29 RX ORDER — HYDROCODONE BITARTRATE AND ACETAMINOPHEN 5; 325 MG/1; MG/1
1 TABLET ORAL EVERY 4 HOURS PRN
Qty: 8 TABLET | Refills: 0 | Status: SHIPPED | OUTPATIENT
Start: 2022-06-29 | End: 2023-03-03

## 2022-06-29 RX ORDER — LIDOCAINE HYDROCHLORIDE 10 MG/ML
0.5 INJECTION, SOLUTION EPIDURAL; INFILTRATION; INTRACAUDAL; PERINEURAL ONCE AS NEEDED
Status: DISCONTINUED | OUTPATIENT
Start: 2022-06-29 | End: 2022-06-29 | Stop reason: HOSPADM

## 2022-06-29 RX ORDER — LIDOCAINE HYDROCHLORIDE 20 MG/ML
INJECTION, SOLUTION EPIDURAL; INFILTRATION; INTRACAUDAL; PERINEURAL AS NEEDED
Status: DISCONTINUED | OUTPATIENT
Start: 2022-06-29 | End: 2022-06-29 | Stop reason: SURG

## 2022-06-29 RX ORDER — FENTANYL CITRATE 50 UG/ML
25 INJECTION, SOLUTION INTRAMUSCULAR; INTRAVENOUS
Status: DISCONTINUED | OUTPATIENT
Start: 2022-06-29 | End: 2022-06-29 | Stop reason: HOSPADM

## 2022-06-29 RX ORDER — DROPERIDOL 2.5 MG/ML
0.62 INJECTION, SOLUTION INTRAMUSCULAR; INTRAVENOUS ONCE AS NEEDED
Status: DISCONTINUED | OUTPATIENT
Start: 2022-06-29 | End: 2022-06-29 | Stop reason: HOSPADM

## 2022-06-29 RX ORDER — LIDOCAINE HYDROCHLORIDE AND EPINEPHRINE 10; 10 MG/ML; UG/ML
INJECTION, SOLUTION INFILTRATION; PERINEURAL AS NEEDED
Status: DISCONTINUED | OUTPATIENT
Start: 2022-06-29 | End: 2022-06-29 | Stop reason: HOSPADM

## 2022-06-29 RX ORDER — SODIUM CHLORIDE 0.9 % (FLUSH) 0.9 %
3 SYRINGE (ML) INJECTION EVERY 12 HOURS SCHEDULED
Status: DISCONTINUED | OUTPATIENT
Start: 2022-06-29 | End: 2022-06-29 | Stop reason: HOSPADM

## 2022-06-29 RX ORDER — SODIUM CHLORIDE 0.9 % (FLUSH) 0.9 %
3-10 SYRINGE (ML) INJECTION AS NEEDED
Status: DISCONTINUED | OUTPATIENT
Start: 2022-06-29 | End: 2022-06-29 | Stop reason: HOSPADM

## 2022-06-29 RX ORDER — OXYCODONE AND ACETAMINOPHEN 10; 325 MG/1; MG/1
1 TABLET ORAL ONCE AS NEEDED
Status: DISCONTINUED | OUTPATIENT
Start: 2022-06-29 | End: 2022-06-29 | Stop reason: HOSPADM

## 2022-06-29 RX ORDER — SODIUM CHLORIDE 0.9 % (FLUSH) 0.9 %
3 SYRINGE (ML) INJECTION AS NEEDED
Status: DISCONTINUED | OUTPATIENT
Start: 2022-06-29 | End: 2022-06-29 | Stop reason: HOSPADM

## 2022-06-29 RX ORDER — SODIUM CHLORIDE, SODIUM LACTATE, POTASSIUM CHLORIDE, CALCIUM CHLORIDE 600; 310; 30; 20 MG/100ML; MG/100ML; MG/100ML; MG/100ML
1000 INJECTION, SOLUTION INTRAVENOUS CONTINUOUS
Status: DISCONTINUED | OUTPATIENT
Start: 2022-06-29 | End: 2022-06-29 | Stop reason: HOSPADM

## 2022-06-29 RX ORDER — MAGNESIUM HYDROXIDE 1200 MG/15ML
LIQUID ORAL AS NEEDED
Status: DISCONTINUED | OUTPATIENT
Start: 2022-06-29 | End: 2022-06-29 | Stop reason: HOSPADM

## 2022-06-29 RX ORDER — SODIUM CHLORIDE, SODIUM LACTATE, POTASSIUM CHLORIDE, CALCIUM CHLORIDE 600; 310; 30; 20 MG/100ML; MG/100ML; MG/100ML; MG/100ML
100 INJECTION, SOLUTION INTRAVENOUS CONTINUOUS
Status: DISCONTINUED | OUTPATIENT
Start: 2022-06-29 | End: 2022-06-29 | Stop reason: HOSPADM

## 2022-06-29 RX ORDER — FLUMAZENIL 0.1 MG/ML
0.2 INJECTION INTRAVENOUS AS NEEDED
Status: DISCONTINUED | OUTPATIENT
Start: 2022-06-29 | End: 2022-06-29 | Stop reason: HOSPADM

## 2022-06-29 RX ORDER — HYDROCODONE BITARTRATE AND ACETAMINOPHEN 5; 325 MG/1; MG/1
1 TABLET ORAL ONCE AS NEEDED
Status: DISCONTINUED | OUTPATIENT
Start: 2022-06-29 | End: 2022-06-29 | Stop reason: HOSPADM

## 2022-06-29 RX ORDER — OXYCODONE AND ACETAMINOPHEN 7.5; 325 MG/1; MG/1
2 TABLET ORAL EVERY 4 HOURS PRN
Status: DISCONTINUED | OUTPATIENT
Start: 2022-06-29 | End: 2022-06-29 | Stop reason: HOSPADM

## 2022-06-29 RX ORDER — ONDANSETRON 4 MG/1
4 TABLET, FILM COATED ORAL ONCE AS NEEDED
Status: DISCONTINUED | OUTPATIENT
Start: 2022-06-29 | End: 2022-06-29 | Stop reason: HOSPADM

## 2022-06-29 RX ADMIN — LIDOCAINE HYDROCHLORIDE 80 MG: 20 INJECTION, SOLUTION EPIDURAL; INFILTRATION; INTRACAUDAL; PERINEURAL at 12:16

## 2022-06-29 RX ADMIN — SODIUM CHLORIDE, POTASSIUM CHLORIDE, SODIUM LACTATE AND CALCIUM CHLORIDE 1000 ML: 600; 310; 30; 20 INJECTION, SOLUTION INTRAVENOUS at 09:16

## 2022-06-29 RX ADMIN — PROPOFOL 100 MCG/KG/MIN: 10 INJECTION, EMULSION INTRAVENOUS at 12:16

## 2022-06-29 NOTE — ANESTHESIA PREPROCEDURE EVALUATION
Anesthesia Evaluation     Patient summary reviewed   no history of anesthetic complications:  NPO Solid Status: > 6 hours             Airway   Mallampati: II  Dental    (+) edentulous    Pulmonary    (+) a smoker Former,   Cardiovascular   Exercise tolerance: good (4-7 METS)    (+) hypertension, dysrhythmias Atrial Fib, hyperlipidemia,   (-) valvular problems/murmurs, past MI, cardiac stents, CABG      Neuro/Psych  (+) CVA (2018--instability now ),    (-) seizures  GI/Hepatic/Renal/Endo    (+)   diabetes mellitus, thyroid problem     Musculoskeletal     Abdominal    Substance History      OB/GYN          Other   blood dyscrasia anemia,   history of cancer                    Anesthesia Plan    ASA 3     MAC     (eliquis held )  intravenous induction     Anesthetic plan, risks, benefits, and alternatives have been provided, discussed and informed consent has been obtained with: patient.        CODE STATUS:

## 2022-06-29 NOTE — ANESTHESIA POSTPROCEDURE EVALUATION
Patient: Juan Carlos Marsh    Procedure Summary     Date: 06/29/22 Room / Location:  [facility] OR  /  [facility]    Anesthesia Start: 1211 Anesthesia Stop: 1300    Procedure: EXCISION LESION OF RIGHT JAWLINE WITH FROZEN SECTION WITH POSSIBLE FLAP OR GRAFT (Right ) Diagnosis:       Neoplasm of uncertain behavior      Basal cell carcinoma (BCC) of skin of other part of face      (Neoplasm of uncertain behavior [D48.9])      (Basal cell carcinoma (BCC) of skin of other part of face [C44.319])    Surgeons: Ky Godoy MD Provider: Gage Barrios CRNA    Anesthesia Type: MAC ASA Status: 3          Anesthesia Type: MAC    Vitals  Vitals Value Taken Time   BP     Temp     Pulse 50 06/29/22 1301   Resp     SpO2 100 % 06/29/22 1301   Vitals shown include unvalidated device data.        Post Anesthesia Care and Evaluation    Patient location during evaluation: PHASE II  Patient participation: complete - patient participated  Level of consciousness: awake  Pain management: adequate    Airway patency: patent  Anesthetic complications: No anesthetic complications    Cardiovascular status: acceptable  Respiratory status: acceptable  Hydration status: acceptable    Comments: /56 (BP Location: Right arm, Patient Position: Lying)   Pulse 52   Temp 97.6 °F (36.4 °C) (Temporal)   Resp 16   SpO2 99%

## 2022-06-30 LAB
CYTO UR: NORMAL
LAB AP CASE REPORT: NORMAL
Lab: NORMAL
Lab: NORMAL
PATH REPORT.FINAL DX SPEC: NORMAL
PATH REPORT.GROSS SPEC: NORMAL

## 2022-07-11 ENCOUNTER — OFFICE VISIT (OUTPATIENT)
Dept: HEMATOLOGY | Age: 82
End: 2022-07-11
Payer: MEDICARE

## 2022-07-11 ENCOUNTER — OFFICE VISIT (OUTPATIENT)
Dept: OTOLARYNGOLOGY | Facility: CLINIC | Age: 82
End: 2022-07-11

## 2022-07-11 ENCOUNTER — HOSPITAL ENCOUNTER (OUTPATIENT)
Dept: INFUSION THERAPY | Age: 82
Discharge: HOME | End: 2022-07-11
Payer: MEDICARE

## 2022-07-11 VITALS
HEART RATE: 60 BPM | SYSTOLIC BLOOD PRESSURE: 150 MMHG | WEIGHT: 205.2 LBS | HEIGHT: 72 IN | BODY MASS INDEX: 27.79 KG/M2 | OXYGEN SATURATION: 96 % | DIASTOLIC BLOOD PRESSURE: 64 MMHG

## 2022-07-11 DIAGNOSIS — C82.90 FOLLICULAR LYMPHOMA, UNSPECIFIED GRADE, UNSPECIFIED BODY REGION (HCC): Primary | ICD-10-CM

## 2022-07-11 DIAGNOSIS — C85.90 NON-HODGKIN'S LYMPHOMA, UNSPECIFIED BODY REGION, UNSPECIFIED NON-HODGKIN LYMPHOMA TYPE (HCC): ICD-10-CM

## 2022-07-11 DIAGNOSIS — Z71.89 CARE PLAN DISCUSSED WITH PATIENT: ICD-10-CM

## 2022-07-11 DIAGNOSIS — C44.310 BASAL CELL CARCINOMA (BCC) OF SKIN OF FACE, UNSPECIFIED PART OF FACE: Primary | ICD-10-CM

## 2022-07-11 DIAGNOSIS — C44.310 BASAL CELL CARCINOMA (BCC) OF SKIN OF FACE, UNSPECIFIED PART OF FACE: ICD-10-CM

## 2022-07-11 LAB
BASOPHILS ABSOLUTE: 0.01 K/UL (ref 0.01–0.08)
BASOPHILS RELATIVE PERCENT: 0.3 % (ref 0.1–1.2)
EOSINOPHILS ABSOLUTE: 0.03 K/UL (ref 0.04–0.54)
EOSINOPHILS RELATIVE PERCENT: 1 % (ref 0.7–7)
HCT VFR BLD CALC: 40.2 % (ref 40.1–51)
HEMOGLOBIN: 12.2 G/DL (ref 13.7–17.5)
LYMPHOCYTES ABSOLUTE: 0.69 K/UL (ref 1.18–3.74)
LYMPHOCYTES RELATIVE PERCENT: 22.2 % (ref 19.3–53.1)
MCH RBC QN AUTO: 31.4 PG (ref 25.7–32.2)
MCHC RBC AUTO-ENTMCNC: 30.3 G/DL (ref 32.3–36.5)
MCV RBC AUTO: 103.3 FL (ref 79–92.2)
MONOCYTES ABSOLUTE: 0.28 K/UL (ref 0.24–0.82)
MONOCYTES RELATIVE PERCENT: 9 % (ref 4.7–12.5)
NEUTROPHILS ABSOLUTE: 2.08 K/UL (ref 1.56–6.13)
NEUTROPHILS RELATIVE PERCENT: 66.9 % (ref 34–71.1)
PDW BLD-RTO: 14.2 % (ref 11.6–14.4)
PLATELET # BLD: 113 K/UL (ref 163–337)
PMV BLD AUTO: 10.3 FL (ref 7.4–10.4)
RBC # BLD: 3.89 M/UL (ref 4.63–6.08)
WBC # BLD: 3.11 K/UL (ref 4.23–9.07)

## 2022-07-11 PROCEDURE — 99212 OFFICE O/P EST SF 10 MIN: CPT

## 2022-07-11 PROCEDURE — 85025 COMPLETE CBC W/AUTO DIFF WBC: CPT

## 2022-07-11 PROCEDURE — G8427 DOCREV CUR MEDS BY ELIG CLIN: HCPCS | Performed by: INTERNAL MEDICINE

## 2022-07-11 PROCEDURE — 99213 OFFICE O/P EST LOW 20 MIN: CPT | Performed by: INTERNAL MEDICINE

## 2022-07-11 PROCEDURE — 96523 IRRIG DRUG DELIVERY DEVICE: CPT

## 2022-07-11 PROCEDURE — 2580000003 HC RX 258: Performed by: INTERNAL MEDICINE

## 2022-07-11 PROCEDURE — 6360000002 HC RX W HCPCS: Performed by: INTERNAL MEDICINE

## 2022-07-11 PROCEDURE — 1036F TOBACCO NON-USER: CPT | Performed by: INTERNAL MEDICINE

## 2022-07-11 PROCEDURE — 99024 POSTOP FOLLOW-UP VISIT: CPT | Performed by: OTOLARYNGOLOGY

## 2022-07-11 PROCEDURE — 1123F ACP DISCUSS/DSCN MKR DOCD: CPT | Performed by: INTERNAL MEDICINE

## 2022-07-11 PROCEDURE — G8417 CALC BMI ABV UP PARAM F/U: HCPCS | Performed by: INTERNAL MEDICINE

## 2022-07-11 RX ORDER — FLUTICASONE PROPIONATE 50 MCG
2 SPRAY, SUSPENSION (ML) NASAL DAILY
Qty: 18.2 ML | Refills: 3 | Status: SHIPPED | OUTPATIENT
Start: 2022-07-11 | End: 2022-07-21

## 2022-07-11 RX ORDER — SODIUM CHLORIDE 9 MG/ML
25 INJECTION, SOLUTION INTRAVENOUS PRN
Status: CANCELLED | OUTPATIENT
Start: 2022-07-11

## 2022-07-11 RX ORDER — SODIUM CHLORIDE 0.9 % (FLUSH) 0.9 %
5-40 SYRINGE (ML) INJECTION PRN
Status: DISCONTINUED | OUTPATIENT
Start: 2022-07-11 | End: 2022-07-12 | Stop reason: HOSPADM

## 2022-07-11 RX ORDER — AZELASTINE 1 MG/ML
2 SPRAY, METERED NASAL 2 TIMES DAILY
Qty: 30 ML | Refills: 3 | Status: SHIPPED | OUTPATIENT
Start: 2022-07-11 | End: 2022-07-21

## 2022-07-11 RX ORDER — HEPARIN SODIUM (PORCINE) LOCK FLUSH IV SOLN 100 UNIT/ML 100 UNIT/ML
500 SOLUTION INTRAVENOUS PRN
Status: DISCONTINUED | OUTPATIENT
Start: 2022-07-11 | End: 2022-07-12 | Stop reason: HOSPADM

## 2022-07-11 RX ORDER — HEPARIN SODIUM (PORCINE) LOCK FLUSH IV SOLN 100 UNIT/ML 100 UNIT/ML
500 SOLUTION INTRAVENOUS PRN
Status: CANCELLED | OUTPATIENT
Start: 2022-07-11

## 2022-07-11 RX ORDER — SODIUM CHLORIDE 0.9 % (FLUSH) 0.9 %
5-40 SYRINGE (ML) INJECTION PRN
Status: CANCELLED | OUTPATIENT
Start: 2022-07-11

## 2022-07-11 RX ORDER — SODIUM CHLORIDE 9 MG/ML
25 INJECTION, SOLUTION INTRAVENOUS PRN
OUTPATIENT
Start: 2022-07-11

## 2022-07-11 RX ADMIN — SODIUM CHLORIDE, PRESERVATIVE FREE 20 ML: 5 INJECTION INTRAVENOUS at 11:38

## 2022-07-11 RX ADMIN — HEPARIN 500 UNITS: 100 SYRINGE at 11:38

## 2022-07-11 NOTE — PROGRESS NOTES
Procedure   Suture Removal    Date/Time: 7/11/2022 9:44 AM  Performed by: Debora Singletary RN  Authorized by: Ky Godoy MD   Consent: Verbal consent obtained.  Consent given by: patient  Patient identity confirmed: verbally with patient  Body area: head/neck  Location details: right cheek  Comments: Patient presents for suture removal. The incision is well-approximated with no redness or edema noted. Path discussed

## 2022-07-11 NOTE — PROGRESS NOTES
MEDICAL ONCOLOGY PROGRESS NOTE    Pt Name: Julito Gandara  MRN: [id number]  YOB: 1940  Date of evaluation: 7/11/2022      HISTORY OF PRESENT ILLNESS:    The patient has a diagnosis of mantle cell lymphoma in 2017. He received 6 cycles of bendamustine/rituximab followed by 2 years of rituximab maintenance completed December 2019. He has been in remission. He had CT chest abdomen pelvis another day. He had recent skin cancer surgery at [facility] advised ENT group. They noticed that he was pancytopenic. Therefore I request you to come back and see us. He denies any new symptoms. Denies any peripheral neuropathy. Diagnosis  · B-cell lymphoma, consistent with Mantle cell lymphoma, June 2017  · CD20+  · Translocation 11, 14  · Cyclin D1 positive  · SOX-11 positive    Treatment Summary  · 8/2/17 - 12/19/17 Completion Bendamustine/Rituxan x6 cycles  · 2/16/18 - 12/18/19 Completion Maintenance Rituxan every 2 months    Hematology/Cancer History  Lyndon Lester was first seen by me on 12/8/2021. He presents to [facility] continued of care of a history of mantle cell lymphoma. He was treated in [place], [place]. He received induction chemotherapy with bendamustine/rituximab followed by 2 years of maintenance rituximab. · 12/16/16 US Abdominal ([facility], [place]):  20 cm x 9.7 cm splenomegaly  · 5/19/17 CT chest, abdomen, pelvis ([address], [place]): Splenomegaly is similar to the previous exam. No evidence of adenopathy involving the chest abdomen or pelvis. Small nonspecific granular nodule the right upper lobe. Follow-up imaging to assess stability recommended. · 6/26/17 Bone marrow ([facility], [address] [place] [address]): Mildly hypercellular, 40%. CD20+ B-cell lymphoma consistent with mantle cell lymphoma. B-cell lymphoma involves 45%-50% of marrow cellularity. Trilineage hematopoiesis with adequate megakaryocytes. Iron increased (3+/4+).  FLOW: CD20+, CD5+, monoclonal B-cell population. Monoclonal B-cell population is CD5+, CD19+, Bright  CD20+, Lambda+, CD23 dim and involves 17% of events. No increased blast population. KARYOTYPE: 45,X,-Y [4]/46,XY[16]. FISH: IGH/BCL1 translocation was detected. MOLECULAR: IgVh somatic hypermutation analysis failed to detect a prominent clonal B-cell population. COMMENT: The monoclonal B-cell population demonstrates weak staining with Cyclin D1, most suggestive of mantle cell lymphoma. FISH for t(11;14) has been requested for definitive evaluation as mantle cell lymphoma. SOX-11 IHC stain is positive in the lymphocytes, confirming classical  Type (aggressive) mantle cell lymphoma. · 7/25/17 PET scan ([address], [place]): No focal area of abnormal uptake. Splenomegaly. · [facility]28[facility]17 [facility] [place] Normal LVEF of 66%. No wall motion abnormalities. · 10/23/17 CT chest, abdomen, pelvis ([address], [place]): Decrease in splenomegaly. No lymphadenopathy or other definite suspicious findings. Unchanged tiny bilateral pulmonary nodules, favored to be benign. Attention on follow-up recommended. Colonic diverticulosis. Prostatomegaly. · 8/2/17 - 12/19/17 CompletionTreanda Rituxan x6 cycles  · 2/9/18 CT neck, chest, abdomen, pelvis ([address], [place]): No acute abnormality. No significant change in splenomegaly. No  Lymphadenopathy. Redemonstration of incidental findings to include colonic diverticulosis, prostatomegaly, coronary atherosclerotic disease, and tiny likely benign bilateral pulmonary nodules. New mild bilateral lower lobe reticular ground-glass opacities, infectious/inflammatory in appearance. · 2/16/18 - 12/18/19 Completion Maintenance Rituxan every 2 months  · 9/26/18 CT neck, chest, abdomen, pelvis ([address], [place]): No CT evidence of malignancy, lymphadenopthay or metastatic disease within the neck, chest, abdomen, pelvis.  No significant change in mild splenomegaly. · 11/15/18 MRI C-spine [facility]): Multilevel degenerative disc and facet disease. Multilevel bilateral neural foraminal narrowing, greatest at C5-C6 where it is severe bilaterally. The vocal cords are opposed, limiting the evaluation of the neck area for a mass. No CT evidence of malignancy, lymphadenopthay or metastatic disease within the neck, chest, abdomen, pelvis. The spleen is now normal size. · 5/9/19 CT neck, chest, abdomen, pelvis ([address], [place]): No CT evidence of malignancy, lymphadenopthay or metastatic disease within the neck, chest, abdomen, pelvis. · 12/17/19 CT neck, chest, abdomen, pelvis ([address], [place]): No CT evidence of malignancy, lymphadenopthay or metastatic disease within the neck, chest, abdomen, pelvis. · 6/9/20 CT chest, abdomen, pelvis ([address], [place]): No CT evidence of malignancy, lymphadenopthay or metastatic disease within the chest, abdomen, pelvis. · 6/9/20  IgG 597.1, IgA 72.9, IgM 58, Kappa 18.32, Lambda 12.67, Kappa/Lambda ratio 1.45  · 10/27/20  IgG 631.1, IgA 95.3, IgM 63, Kappa 18.37, Lambda 13.12, Kappa/Lambda ratio 1.40  · 2/23/21  IgG 596, IgA 83.1, IgM 65,  · 3/23/21 CT chest, abdomen, pelvis ([address], [place]): No CT evidence of malignancy, lymphadenopathy or metastatic disease within the chest, abdomen, pelvis. Extensive coronary artery calcifications. · 6/22/21- IgG 534.6, IgA 91.8, IgM 59, Kappa 19.25, Lambda 16.27, Kappa/Lambda ratio 1.18  · 12/8/2021-she was first seen by me. Recommend continued surveillance. · 4/8/2022 CT Abd/Pelvis W IV Contrast (Oral) No acute abnormality of the abdomen or pelvis, particularly, no evidence of a neoplastic process or metastatic disease. No mass or lymphadenopathy. The stable low density nodules in both kidneys.  The nodule in the lower pole of the right kidney medially probably represent a complicated cyst. A follow-up study in 6 months may be obtained to ensure stability. A stable tiny low-density nodule in the uncinate process of thehead of the pancreas. Another follow-up examination in one year may be obtained for comparison and ensure stability. Enlarged prostate. · 4/8/2022 CT Chest W Contrast Mild chronic lung changes. No mass or lymphadenopathy is seen within the chest.  · 4/11/2022-I reviewed CT chest abdomen pelvis. No evidence of lymphoma recurrence. Small nodule in the head of the pancreas. Follow-up 1 year recommended. · 6/29/2022 Skin from [facility]) Nodular basal cell carcinoma occurring on solar damaged skin. Deep and peripheral margins negative for tumor in plane of section examined. Past Medical History:    Past Medical History:   Diagnosis Date    Agent orange exposure     Atrial fibrillation ([address]     Diabetes mellitus ([address]     Hyperlipidemia     Hypertension     Non Hodgkin's lymphoma ([address]     Non-Hodgkin lymphoma ([address]     Skin cancer     right low cheek       Past Surgical History:    Past Surgical History:   Procedure Laterality Date    ANKLE SURGERY      CARDIAC CATHETERIZATION      JOINT REPLACEMENT      SHOULDER ARTHROPLASTY         Social History:    Marital status:   Smoking status:Former;[id number]  ETOH status:No  Resides: [place], [place]  Prior exposure round-up and Asbestos, agent orange    Family History:   · Brother, Lung cancer    Current Hospital Medications:    Current Outpatient Medications   Medication Sig Dispense Refill    mupirocin (BACTROBAN) 2 % ointment Apply topically 3 times daily Apply topically 3 times daily.       atorvastatin (LIPITOR) 80 MG tablet Take 1 tablet by mouth nightly 30 tablet 1    amLODIPine (NORVASC) 10 MG tablet Take 1 tablet by mouth daily 30 tablet 5    amiodarone (CORDARONE) 200 MG tablet Take 1 tablet by mouth daily 30 tablet 5    apixaban (ELIQUIS) 2.5 MG TABS tablet Take 2 tablets by mouth 2 times daily 60 tablet 5    olmesartan (BENICAR) 40 MG tablet Take 1 tablet by mouth daily 30 tablet 5    levothyroxine (SYNTHROID) 88 MCG tablet Take 88 mcg by mouth Daily      CINNAMON PO Take 4,000 mg by mouth 2 times daily      MULTIPLE VITAMIN PO Take 1 tablet by mouth nightly      Cholecalciferol (VITAMIN D) 50 MCG (2000 UT) CAPS capsule Take by mouth nightly       Vitamin D, Cholecalciferol, 25 MCG (1000 UT) TABS Take 2 tablets by mouth nightly      hydroCHLOROthiazide (HYDRODIURIL) 12.5 MG tablet Take 12.5 mg by mouth daily      tamsulosin (FLOMAX) 0.4 MG capsule Take 0.4 mg by mouth nightly      Cyanocobalamin (VITAMIN B 12 PO) Take 5,000 mcg by mouth daily      terbinafine (LAMISIL) 250 MG tablet Take 250 mg by mouth daily (Patient not taking: Reported on 6/21/2022)      fluticasone (FLONASE) 50 MCG/ACT nasal spray 1 spray by Each Nostril route daily as needed for Rhinitis or Allergies      Multiple Vitamins-Minerals (EYE VITAMINS PO) Take 1 tablet by mouth nightly OcuXanthin OTC supplement      ascorbic acid (VITAMIN C) 500 MG tablet Take 2,000 mg by mouth daily        No current facility-administered medications for this visit.      Facility-Administered Medications Ordered in Other Visits   Medication Dose Route Frequency Provider Last Rate Last Admin    sodium chloride flush 0.9 % injection 5-40 mL  5-40 mL IntraVENous PRN Lilliana Chou MD        heparin flush 100 UNIT/ML injection 500 Units  500 Units IntraCATHeter PRN Lilliana Chou MD           Allergies: No Known Allergies      Subjective   REVIEW OF SYSTEMS:   CONSTITUTIONAL: no fever, no night sweats, fatigue;  HEENT: no blurring of vision, no double vision, no hearing difficulty, no tinnitus, no ulceration, no dysplasia, no epistaxis;   LUNGS: no cough, no hemoptysis, no wheeze,  no shortness of breath;  CARDIOVASCULAR: no palpitation, no chest pain, no shortness of breath;  GI: no abdominal pain, no nausea, no vomiting, no diarrhea, no constipation;  SON: no dysuria, no hematuria, no frequency or urgency, no nephrolithiasis;  MUSCULOSKELETAL: no joint pain, no swelling, no stiffness;  ENDOCRINE: no polyuria, no polydipsia, no cold or heat intolerance;  HEMATOLOGY: no easy bruising or bleeding, no history of clotting disorder;  DERMATOLOGY: no skin rash, no eczema, no pruritus;  PSYCHIATRY: no depression, no anxiety, no panic attacks, no suicidal ideation, no homicidal ideation;  NEUROLOGY: no syncope, no seizures, no numbness or tingling of hands, no numbness or tingling of feet, no paresis;     Objective   BP (!) 150/64 (Site: Right Upper Arm, Position: Sitting)   Pulse 60   Ht 6' (1.829 m)   Wt 205 lb 3.2 oz (93.1 kg)   SpO2 96%   BMI 27.83 kg/m²     PHYSICAL EXAM:  CONSTITUTIONAL: Alert, appropriate, no acute distress  EYES: Non icteric, EOM intact, pupils equal round   ENT: Mucus membranes moist, no oral pharyngeal lesions, external inspection of ears and nose are normal.  NECK: Supple, no masses. No palpable thyroid mass  CHEST/LUNGS: CTA bilaterally, normal respiratory effort   CARDIOVASCULAR: RRR, no murmurs. No lower extremity edema  ABDOMEN: soft non-tender, active bowel sounds, no HSM. No palpable masses  EXTREMITIES: warm, full ROM in all 4 extremities, no focal weakness. SKIN: warm, dry with no rashes or lesions  LYMPH: No cervical, clavicular, axillary, or inguinal lymphadenopathy  NEUROLOGIC: follows commands, non focal   PSYCH: mood and affect appropriate. Alert and oriented to time, place, person    LABORATORY RESULTS REVIEWED/ANALYZED BY ME:  6/29/2022 Skin from [facility]) Nodular basal cell carcinoma occurring on solar damaged skin. Deep and peripheral margins negative for tumor in plane of section examined.   Lab Results   Component Value Date    WBC 3.11 (L) 07/11/2022    HGB 12.2 (L) 07/11/2022    HCT 40.2 07/11/2022    .3 (H) 07/11/2022     (L) 07/11/2022     Lab Results   Component Value Date    NEUTROABS 2.08 07/11/2022       RADIOLOGY STUDIES REVIEWED BY ME:  None    ASSESSMENT:    No orders of the defined types were placed in this encounter. Dorinda Avendano was seen today for follow-up. Diagnoses and all orders for this visit:    Follicular lymphoma, unspecified grade, unspecified body region [facility])    Care plan discussed with patient    Basal cell carcinoma (BCC) of skin of face, unspecified part of face    Non-Hodgkin's lymphoma, unspecified body region, unspecified non-Hodgkin lymphoma type ([facility]      Mantle cell lymphoma, SOX11 positive, June 2017  Essentially, status post completion of induction chemotherapy with bendamustine/rituximab followed by 2 years of maintenance rituximab completed December 2019. He is in clinical/radiologic complete response.  -Continue clinical/short-term surveillance  -Repeat CT chest, abdomen, pelvis March 2022- no evidence of lymphoma recurrence  --Repeat CT chest/abdomen-pelvis March 2023  -No evidence of lymphoma recurrence    Pancytopenia-overall, stable  WBC 3.11, , ANC 2.08  Hemoglobin 12.2 and platelet count 919,968. These numbers have been stable for at least a year. The lymphopenia is due to prior rituximab treatment. Platelet counts have been overall stable over the last year. Therefore, no further work-up at this time. We will continue to monitor his blood counts. Environmental exposure-[place]/agent orange. This may have contributed to his diagnosis of lymphoma. More likely than not. A. fib-continue Eliquis. Advised compliance. Skin lesion over right mandibular region-referral to dermatology. -6/29/2022-basal cell carcinoma    Small pancreatic nodule  -Follow-up CT scan 1 year.     PLAN:  · RTC with MD 2 months  · Recommend CT Chest, Abdomen, Pelvis in April 2023  · Port Flush today and every 8 weeks  · Continue follow-up with Dr. Amanda Ivory ENT  · Continue follow-up with Dr. Amil Gitelman Dermatology  · Reviewed pathology report and ENT notes    ENID, Julio Scott am pre charting as Medical Assistant for Mayra Donovan MD. Electronically signed by Julio Scott MA on 7/11/2022 at 11:25 AM CDT. Ovidio Hannah am scribing as Medical Assistant for Mayra Donovan MD. Electronically signed by Julio Scott MA on 7/11/2022 at 11:25 AM CDT. I, Dr Sher Hernandez, personally performed the services described in this documentation as scribed by Julio Scott MA in my presence and is both accurate and complete. I have seen, examined and reviewed this patient medication list, appropriate labs and imaging studies. I reviewed relevant medical records and others physicians notes. I discussed the plans of care with the patient. I answered all the questions to the patients satisfaction. I have also reviewed the chief complaint (CC) and part of the history (History of Present Illness (HPI), Past Family Social History [facility]), or Review of Systems (ROS) and made changes when appropriated. (Please note that portions of this note were completed with a voice recognition program. Efforts were made to edit the dictations but occasionally words are mis-transcribed. )Electronically signed by Mayra Donovan MD on 7/11/2022 at 11:32 AM

## 2022-07-21 RX ORDER — AZELASTINE HYDROCHLORIDE 137 UG/1
SPRAY, METERED NASAL
Qty: 30 ML | Refills: 1 | Status: SHIPPED | OUTPATIENT
Start: 2022-07-21 | End: 2022-10-24

## 2022-07-21 RX ORDER — FLUTICASONE PROPIONATE 50 MCG
2 SPRAY, SUSPENSION (ML) NASAL DAILY
Qty: 48 ML | Refills: 1 | Status: SHIPPED | OUTPATIENT
Start: 2022-07-21 | End: 2023-01-12

## 2022-09-02 ENCOUNTER — OFFICE VISIT (OUTPATIENT)
Dept: OTOLARYNGOLOGY | Facility: CLINIC | Age: 82
End: 2022-09-02

## 2022-09-02 VITALS
SYSTOLIC BLOOD PRESSURE: 148 MMHG | BODY MASS INDEX: 26.82 KG/M2 | HEIGHT: 72 IN | DIASTOLIC BLOOD PRESSURE: 62 MMHG | TEMPERATURE: 98.6 F | WEIGHT: 198 LBS | HEART RATE: 61 BPM

## 2022-09-02 DIAGNOSIS — C44.319 BASAL CELL CARCINOMA (BCC) OF SKIN OF OTHER PART OF FACE: Primary | ICD-10-CM

## 2022-09-02 PROCEDURE — 99213 OFFICE O/P EST LOW 20 MIN: CPT | Performed by: NURSE PRACTITIONER

## 2022-09-02 NOTE — PROGRESS NOTES
YOB: 1940  Location: [facility]  Location Address: [address] [place] [address], [facility], [address] [place] [address]  Location Phone: [phone]    Chief Complaint   Patient presents with   • Skin Lesion     Follow up       History of Present Illness  Juan Carlos Marsh is a 82 y.o. male.  Juan Carlos Marsh is here for follow up of ENT complaints. The patient has had problems with basal cell skin cancer  The symptoms are localized to the right side. The patient is s/p excision of basal cell carcinoma of right jawline 2022. He has had a relatively normal postoperative course.      he does complain of some hearing loss and tinnitus that has been going on for quite some time due to extensive noise exposure history              Past Medical History:   Diagnosis Date   • Cancer (HCC)    • Diabetes mellitus (HCC)    • Disease of thyroid gland    • High blood pressure    • High cholesterol    • Sinusitis    • Stroke (HCC)        Past Surgical History:   Procedure Laterality Date   • EXCISION LESION Right 2022    Procedure: Excision of basal cell carcinoma of the right jawline with complex closure;  Surgeon: Ky Godoy MD;  Location: [facility];  Service: ENT;  Laterality: Right;   • HEEL SPUR SURGERY     • PORTACATH PLACEMENT Right    • REPLACEMENT TOTAL KNEE Left    • SHOULDER SURGERY Right    • SKIN LESION EXCISION         Outpatient Medications Marked as Taking for the 22 encounter (Office Visit) with Ashli Hassan APRN   Medication Sig Dispense Refill   • amiodarone (PACERONE) 200 MG tablet Take 200 mg by mouth Daily.     • amLODIPine (NORVASC) 10 MG tablet Take 10 mg by mouth Daily.     • ascorbic acid (VITAMIN C) 500 MG tablet Take 2,000 mg by mouth.     • atorvastatin (LIPITOR) 80 MG tablet atorvastatin 80 mg tablet   TAKE 1 TABLET BY MOUTH AT BEDTIME     • Azelastine HCl 137 MCG/SPRAY solution 2 SPRAYS INTO EACH NOSTRIL AS DIRECTED BY PROVIDER 2 (TWO) TIMES A DAY. 30 mL 1   • cholecalciferol  (VITAMIN D3) 25 MCG (1000 UT) tablet Take 2 tablets by mouth Every Night.     • Contour Next Test test strip 1 each by Other route Daily.     • Eliquis 5 MG tablet tablet Take 5 mg by mouth 2 (Two) Times a Day.     • famotidine (Pepcid) 20 MG tablet Take 1 tablet by mouth 2 (Two) Times a Day. 60 tablet 3   • furosemide (LASIX) 20 MG tablet furosemide 20 mg tablet   TAKE 1 TABLET BY MOUTH ONCE DAILY IN THE MORNING     • hydroCHLOROthiazide (HYDRODIURIL) 12.5 MG tablet hydrochlorothiazide 12.5 mg tablet   TAKE 1 TABLET BY MOUTH ONCE DAILY IN THE MORNING     • HYDROcodone-acetaminophen (NORCO) 5-325 MG per tablet Take 1 tablet by mouth Every 4 (Four) Hours As Needed for Moderate Pain  or Severe Pain  (Pain) for up to 8 doses. 8 tablet 0   • levothyroxine (SYNTHROID, LEVOTHROID) 88 MCG tablet Take 88 mcg by mouth.     • metoprolol tartrate (LOPRESSOR) 50 MG tablet metoprolol tartrate 50 mg tablet   TAKE 1 TABLET BY MOUTH TWICE DAILY     • mupirocin (BACTROBAN) 2 % ointment Apply  topically to the appropriate area as directed.     • olmesartan (BENICAR) 40 MG tablet olmesartan 40 mg tablet   TAKE 1 TABLET BY MOUTH ONCE DAILY     • tamsulosin (FLOMAX) 0.4 MG capsule 24 hr capsule Take 1 capsule by mouth Daily.     • terbinafine (lamiSIL) 250 MG tablet terbinafine HCl 250 mg tablet   TAKE 1 TABLET BY MOUTH ONCE DAILY FOR TOENAIL FUNGUS         Patient has no known allergies.    History reviewed. No pertinent family history.    Social History     Socioeconomic History   • Marital status:    Tobacco Use   • Smoking status: Former Smoker     Types: Cigarettes     Quit date:      Years since quittin.7   • Smokeless tobacco: Never Used   Vaping Use   • Vaping Use: Never used   Substance and Sexual Activity   • Alcohol use: Not Currently   • Drug use: Never   • Sexual activity: Defer       Review of Systems   Constitutional: Negative.    HENT: Positive for hearing loss and postnasal drip.         Admits previous  skin lesion excision      Respiratory: Negative.    Cardiovascular: Negative.    Gastrointestinal: Negative.    Genitourinary: Negative.    Musculoskeletal: Negative.        Vitals:    09/02/22 0952   BP: 148/62   Pulse: 61   Temp: 98.6 °F (37 °C)       Body mass index is 26.85 kg/m².    Objective     Physical Exam  Vitals reviewed.   Constitutional:       Appearance: Normal appearance. He is normal weight.   HENT:      Head: Normocephalic.        Right Ear: Decreased hearing noted.      Left Ear: Decreased hearing noted.      Nose: Nose normal.      Mouth/Throat:      Lips: Pink.   Neurological:      Mental Status: He is alert.         Assessment & Plan   Diagnoses and all orders for this visit:    1. Basal cell carcinoma (BCC) of skin of other part of face (Primary)      * Surgery not found *  No orders of the defined types were placed in this encounter.    No follow-ups on file.       There are no Patient Instructions on file for this visit.

## 2022-09-09 NOTE — PROGRESS NOTES
MEDICAL ONCOLOGY PROGRESS NOTE    Pt Name: Jennifer Shaw  MRN: [id number]  YOB: 1940  Date of evaluation: 9/12/2022      HISTORY OF PRESENT ILLNESS:    The patient has a diagnosis of mantle cell lymphoma in 2017. He received 6 cycles of bendamustine/rituximab followed by 2 years of rituximab maintenance completed December 2019. He has been in remission. He was seen by me recently due to findings of pancytopenia. His cytopenias have been stable. His leukopenia is mostly due to lymphopenia secondary to rituxima. This b. He has mild thrombocytopenia that has been stable. Mild anemia with hemoglobin 11.9 today. He denies any new complaints since last visit. Denies any B symptoms or peripheral adenopathy. Diagnosis  B-cell lymphoma, consistent with Mantle cell lymphoma, June 2017  CD20+  Translocation 11, 14  Cyclin D1 positive  SOX-11 positive    Treatment Summary  8/2/17 - 12/19/17 Completion Bendamustine/Rituxan x6 cycles  2/16/18 - 12/18/19 Completion Maintenance Rituxan every 2 months    Hematology/Cancer History  Marion Kelsey was first seen by me on 12/8/2021. He presents to [facility] continued of care of a history of mantle cell lymphoma. He was treated in [place], [place]. He received induction chemotherapy with bendamustine/rituximab followed by 2 years of maintenance rituximab.  12/16/16 US Abdominal ([facility], [place]):  20 cm x 9.7 cm splenomegaly  5/19/17 CT chest, abdomen, pelvis ([address], [place]): Splenomegaly is similar to the previous exam. No evidence of adenopathy involving the chest abdomen or pelvis. Small nonspecific granular nodule the right upper lobe. Follow-up imaging to assess stability recommended. 6/26/17 Bone marrow ([facility], [place], [place]): Mildly hypercellular, 40%. CD20+ B-cell lymphoma consistent with mantle cell lymphoma. B-cell lymphoma involves 45%-50% of marrow cellularity.  Trilineage hematopoiesis with adequate megakaryocytes. Iron increased (3+/4+). FLOW: CD20+, CD5+, monoclonal B-cell population. Monoclonal B-cell population is CD5+, CD19+, Bright  CD20+, Lambda+, CD23 dim and involves 17% of events. No increased blast population. KARYOTYPE: 45,X,-Y [4]/46,XY[16]. FISH: IGH/BCL1 translocation was detected. MOLECULAR: IgVh somatic hypermutation analysis failed to detect a prominent clonal B-cell population. COMMENT: The monoclonal B-cell population demonstrates weak staining with Cyclin D1, most suggestive of mantle cell lymphoma. FISH for t(11;14) has been requested for definitive evaluation as mantle cell lymphoma. SOX-11 IHC stain is positive in the lymphocytes, confirming classical  Type (aggressive) mantle cell lymphoma. [facility]17 [facility] [place] No focal area of abnormal uptake. Splenomegaly. 7/28/17 [facility] [place] Normal LVEF of 66%. No wall motion abnormalities. 10/23/17 CT chest, abdomen, pelvis ([address], [place]): Decrease in splenomegaly. No lymphadenopathy or other definite suspicious findings. Unchanged tiny bilateral pulmonary nodules, favored to be benign. Attention on follow-up recommended. Colonic diverticulosis. Prostatomegaly. 8/2/17 - 12/19/17 CompletionTreanda Rituxan x6 cycles  2/9/18 CT neck, chest, abdomen, pelvis ([address], [place]): No acute abnormality. No significant change in splenomegaly. No  Lymphadenopathy. Redemonstration of incidental findings to include colonic diverticulosis, prostatomegaly, coronary atherosclerotic disease, and tiny likely benign bilateral pulmonary nodules. New mild bilateral lower lobe reticular ground-glass opacities, infectious/inflammatory in appearance.    2/16/18 - 12/18/19 Completion Maintenance Rituxan every 2 months  9/26/18 CT neck, chest, abdomen, pelvis ([address], [place]): No CT evidence of malignancy, lymphadenopthay or metastatic disease within the neck, chest, abdomen, pelvis. No significant change in mild splenomegaly. 11/15/18 MRI C-spine [facility] [place] [place]): Multilevel degenerative disc and facet disease. Multilevel bilateral neural foraminal narrowing, greatest at C5-C6 where it is severe bilaterally. The vocal cords are opposed, limiting the evaluation of the neck area for a mass. No CT evidence of malignancy, lymphadenopthay or metastatic disease within the neck, chest, abdomen, pelvis. The spleen is now normal size. 5/9/19 CT neck, chest, abdomen, pelvis ([address], [place]): No CT evidence of malignancy, lymphadenopthay or metastatic disease within the neck, chest, abdomen, pelvis. 12/17/19 CT neck, chest, abdomen, pelvis ([address], [place]): No CT evidence of malignancy, lymphadenopthay or metastatic disease within the neck, chest, abdomen, pelvis. 6/9/20 CT chest, abdomen, pelvis ([address], [place]): No CT evidence of malignancy, lymphadenopthay or metastatic disease within the chest, abdomen, pelvis. 6/9/20  IgG 597.1, IgA 72.9, IgM 58, Kappa 18.32, Lambda 12.67, Kappa/Lambda ratio 1.45  10/27/20  IgG 631.1, IgA 95.3, IgM 63, Kappa 18.37, Lambda 13.12, Kappa/Lambda ratio 1.40  2/23/21  IgG 596, IgA 83.1, IgM 65,  3/23/21 CT chest, abdomen, pelvis ([address], [place]): No CT evidence of malignancy, lymphadenopathy or metastatic disease within the chest, abdomen, pelvis. Extensive coronary artery calcifications. 6/22/21- IgG 534.6, IgA 91.8, IgM 59, Kappa 19.25, Lambda 16.27, Kappa/Lambda ratio 1.18  12/8/2021-she was first seen by me. Recommend continued surveillance. 4/8/2022 CT Abd/Pelvis W IV Contrast (Oral) No acute abnormality of the abdomen or pelvis, particularly, no evidence of a neoplastic process or metastatic disease. No mass or lymphadenopathy. The stable low density nodules in both kidneys.  The nodule in the lower pole of the right kidney medially probably represent a complicated cyst. A follow-up study in 6 months may be obtained to ensure stability. A stable tiny low-density nodule in the uncinate process of thehead of the pancreas. Another follow-up examination in one year may be obtained for comparison and ensure stability. Enlarged prostate. 4/8/2022 CT Chest W Contrast Mild chronic lung changes. No mass or lymphadenopathy is seen within the chest.  4/11/2022-I reviewed CT chest abdomen pelvis. No evidence of lymphoma recurrence. Small nodule in the head of the pancreas. Follow-up 1 year recommended. 6/29/2022 Skin from [facility]) Nodular basal cell carcinoma occurring on solar damaged skin. Deep and peripheral margins negative for tumor in plane of section examined. Past Medical History:    Past Medical History:   Diagnosis Date    Agent orange exposure     Atrial fibrillation ([address]     Diabetes mellitus ([address]     Hyperlipidemia     Hypertension     Non Hodgkin's lymphoma ([address]     Non-Hodgkin lymphoma ([address]     Skin cancer     right low cheek       Past Surgical History:    Past Surgical History:   Procedure Laterality Date    ANKLE SURGERY      CARDIAC CATHETERIZATION      JOINT REPLACEMENT      SHOULDER ARTHROPLASTY         Social History:    Marital status:   Smoking status:Former;[id number]  ETOH status:No  Resides: [facility]  Prior exposure round-up and Asbestos, agent orange    Family History:   Brother, Lung cancer    Current Hospital Medications:    Current Outpatient Medications   Medication Sig Dispense Refill    Azelastine HCl 137 MCG/SPRAY SOLN 2 SPRAYS INTO EACH NOSTRIL AS DIRECTED BY PROVIDER 2 (TWO) TIMES A DAY.       famotidine (PEPCID) 20 MG tablet Take 20 mg by mouth 2 times daily      CONTOUR NEXT TEST strip TO TEST BLOOD SUGAR ONCE DAILY DX CODE E11.69 90 DAYS      UNABLE TO FIND Gluconite metabolism & sleep support 1 scoop 1 x a day at night      UNABLE TO FIND Gluco Shield Pro 1 Capsule 1 x a day      Specialty Vitamins Products (Dorthey Stain BLOOD SUGAR SUPPORT) CAPS Take 2 capsules by mouth in the morning and at bedtime      vitamin E 400 UNIT capsule Take 400 Units by mouth daily      mupirocin (BACTROBAN) 2 % ointment Apply topically 3 times daily Apply topically 3 times daily. amLODIPine (NORVASC) 10 MG tablet Take 1 tablet by mouth daily 30 tablet 5    amiodarone (CORDARONE) 200 MG tablet Take 1 tablet by mouth daily 30 tablet 5    apixaban (ELIQUIS) 2.5 MG TABS tablet Take 2 tablets by mouth 2 times daily 60 tablet 5    olmesartan (BENICAR) 40 MG tablet Take 1 tablet by mouth daily 30 tablet 5    levothyroxine (SYNTHROID) 88 MCG tablet Take 88 mcg by mouth Daily      CINNAMON PO Take 4,000 mg by mouth 2 times daily      MULTIPLE VITAMIN PO Take 1 tablet by mouth nightly      Cholecalciferol (VITAMIN D) 50 MCG (2000 UT) CAPS capsule Take by mouth nightly       Vitamin D, Cholecalciferol, 25 MCG (1000 UT) TABS Take 2 tablets by mouth nightly      tamsulosin (FLOMAX) 0.4 MG capsule Take 0.4 mg by mouth nightly      Cyanocobalamin (VITAMIN B 12 PO) Take 5,000 mcg by mouth daily      fluticasone (FLONASE) 50 MCG/ACT nasal spray 1 spray by Each Nostril route daily as needed for Rhinitis or Allergies      Multiple Vitamins-Minerals (EYE VITAMINS PO) Take 1 tablet by mouth nightly OcuXanthin OTC supplement      ascorbic acid (VITAMIN C) 500 MG tablet Take 2,000 mg by mouth daily       atorvastatin (LIPITOR) 80 MG tablet Take 1 tablet by mouth nightly (Patient not taking: Reported on 9/12/2022) 30 tablet 1    hydroCHLOROthiazide (HYDRODIURIL) 12.5 MG tablet Take 12.5 mg by mouth daily (Patient not taking: Reported on 9/12/2022)      terbinafine (LAMISIL) 250 MG tablet Take 250 mg by mouth daily (Patient not taking: Reported on 9/12/2022)       No current facility-administered medications for this visit.      Facility-Administered Medications Ordered in Other Visits   Medication Dose Route Frequency Provider Last Rate Last Admin    sodium chloride flush 0.9 % injection 5-40 mL  5-40 mL IntraVENous PRN Kev Huerta MD        heparin flush 100 UNIT/ML injection 500 Units  500 Units IntraCATHeter PRN Kev Huerta MD           Allergies: No Known Allergies      Subjective   REVIEW OF SYSTEMS:   CONSTITUTIONAL: no fever, no night sweats,  fatigue;  HEENT: no blurring of vision, no double vision, no hearing difficulty, no tinnitus, no ulceration, no dysplasia, no epistaxis;   LUNGS: no cough, no hemoptysis, no wheeze,  no shortness of breath;  CARDIOVASCULAR: no palpitation, no chest pain, no shortness of breath;  GI: no abdominal pain, no nausea, no vomiting, no diarrhea, no constipation;  SON: no dysuria, no hematuria, no frequency or urgency, no nephrolithiasis;  MUSCULOSKELETAL: no joint pain, no swelling, no stiffness;  ENDOCRINE: no polyuria, no polydipsia, no cold or heat intolerance;  HEMATOLOGY: no easy bruising or bleeding, no history of clotting disorder;  DERMATOLOGY: no skin rash, no eczema, no pruritus;  PSYCHIATRY: no depression, no anxiety, no panic attacks, no suicidal ideation, no homicidal ideation;  NEUROLOGY: no syncope, no seizures, no numbness or tingling of hands, no numbness or tingling of feet, no paresis;      Objective   BP (!) 144/64   Pulse 57   Ht 6' (1.829 m)   Wt 199 lb 3.2 oz (90.4 kg)   SpO2 96%   BMI 27.02 kg/m²     PHYSICAL EXAM:  CONSTITUTIONAL: Alert, appropriate, no acute distress  EYES: Non icteric, EOM intact, pupils equal round   ENT: Mucus membranes moist, no oral pharyngeal lesions, external inspection of ears and nose are normal.  NECK: Supple, no masses. No palpable thyroid mass  CHEST/LUNGS: CTA bilaterally, normal respiratory effort   CARDIOVASCULAR: RRR, no murmurs. No lower extremity edema  ABDOMEN: soft non-tender, active bowel sounds, no HSM. No palpable masses  EXTREMITIES: warm, full ROM in all 4 extremities, no focal weakness.   SKIN: warm, dry with no rashes or lesions  LYMPH: No cervical, clavicular, axillary, or inguinal lymphadenopathy  NEUROLOGIC: follows commands, non focal   PSYCH: mood and affect appropriate. Alert and oriented to time, place, person    LABORATORY RESULTS REVIEWED/ANALYZED BY ME:  Lab Results   Component Value Date    WBC 5.81 09/12/2022    HGB 11.8 (L) 09/12/2022    HCT 36.7 (L) 09/12/2022    MCV 97.3 (H) 09/12/2022     (L) 09/12/2022     Lab Results   Component Value Date    NEUTROABS 4.37 09/12/2022       RADIOLOGY STUDIES REVIEWED BY ME:  None    ASSESSMENT:    No orders of the defined types were placed in this encounter. Casie Hooks was seen today for follow-up. Diagnoses and all orders for this visit:    Mantle cell lymphoma of lymph nodes of multiple regions [facility])    Care plan discussed with patient    Basal cell carcinoma (BCC) of skin of face, unspecified part of face    Skin lesions    Lesion of nose      Mantle cell lymphoma, SOX11 positive, June 2017  Essentially, status post completion of induction chemotherapy with bendamustine/rituximab followed by 2 years of maintenance rituximab completed December 2019. He is in clinical/radiologic complete response.  -Continue clinical/short-term surveillance  -Repeat CT chest, abdomen, pelvis March 2022- no evidence of lymphoma recurrence  --Repeat CT chest/abdomen-pelvis March 2023  -No evidence of lymphoma recurrence  -No clinical evidence of recurrence today. No B symptoms. No peripheral neuropathy. Pancytopenia-overall, stable  WBC 3.11, , ANC 2.08  Hemoglobin 12.2 and platelet count 062,856. These numbers have been stable for at least a year. The lymphopenia is due to prior rituximab treatment. Platelet counts have been overall stable over the last year. Therefore, no further work-up at this time. We will continue to monitor his blood counts. Environmental exposure-[place]/agent orange. This may have contributed to his diagnosis of lymphoma. More likely than not.     A. fib-continue Eliquis. Advised compliance. Skin lesion over right mandibular region-referral to dermatology. -6/29/2022-basal cell carcinoma    Small pancreatic nodule  -Follow-up CT scan 1 year. PLAN:  RTC with MD 4 months  CBC today and every 8 weeks  Recommend CT Chest, Abdomen, Pelvis in April 2023  Port Flush today and every 8 weeks  Continue follow-up with Dr. Allie Ambrose ENT  Continue follow-up with Dr. Reji Van Dermatology      I, Melissa Hankins am pre charting  as Medical Assistant for Kev Huerta MD. Electronically signed by Melissa Hankins MA on 9/12/2022 at 7:56 AM CDT. Jem Lei am scribing for Kev Huerta MD. Electronically signed by Colonel Ligia RN on 9/12/2022 at 8:32 AM CDT. I, Dr Pratik Sanders, personally performed the services described in this documentation as scribed by Colonel Strong, DAV in my presence and is both accurate and complete. I have seen, examined and reviewed this patient medication list, appropriate labs and imaging studies. I reviewed relevant medical records and others physicians notes. I discussed the plans of care with the patient. I answered all the questions to the patients satisfaction. I have also reviewed the chief complaint (CC) and part of the history (History of Present Illness (HPI), Past Family Social History [facility]), or Review of Systems (ROS) and made changes when appropriated.        (Please note that portions of this note were completed with a voice recognition program. Efforts were made to edit the dictations but occasionally words are mis-transcribed.)  Electronically signed by Kev Huerta MD on 9/12/2022 at 8:36 AM

## 2022-09-12 ENCOUNTER — OFFICE VISIT (OUTPATIENT)
Dept: HEMATOLOGY | Age: 82
End: 2022-09-12
Payer: MEDICARE

## 2022-09-12 ENCOUNTER — HOSPITAL ENCOUNTER (OUTPATIENT)
Dept: INFUSION THERAPY | Age: 82
Discharge: HOME | End: 2022-09-12
Payer: MEDICARE

## 2022-09-12 VITALS
SYSTOLIC BLOOD PRESSURE: 144 MMHG | DIASTOLIC BLOOD PRESSURE: 64 MMHG | WEIGHT: 199.2 LBS | HEART RATE: 57 BPM | HEIGHT: 72 IN | BODY MASS INDEX: 26.98 KG/M2 | OXYGEN SATURATION: 96 %

## 2022-09-12 DIAGNOSIS — C83.18 MANTLE CELL LYMPHOMA OF LYMPH NODES OF MULTIPLE REGIONS (HCC): Primary | ICD-10-CM

## 2022-09-12 DIAGNOSIS — L98.9 SKIN LESIONS: ICD-10-CM

## 2022-09-12 DIAGNOSIS — Z71.89 CARE PLAN DISCUSSED WITH PATIENT: ICD-10-CM

## 2022-09-12 DIAGNOSIS — C44.310 BASAL CELL CARCINOMA (BCC) OF SKIN OF FACE, UNSPECIFIED PART OF FACE: ICD-10-CM

## 2022-09-12 DIAGNOSIS — C82.90 FOLLICULAR LYMPHOMA, UNSPECIFIED GRADE, UNSPECIFIED BODY REGION (HCC): Primary | ICD-10-CM

## 2022-09-12 DIAGNOSIS — C85.90 NON-HODGKIN'S LYMPHOMA, UNSPECIFIED BODY REGION, UNSPECIFIED NON-HODGKIN LYMPHOMA TYPE (HCC): ICD-10-CM

## 2022-09-12 DIAGNOSIS — D61.818 PANCYTOPENIA (HCC): ICD-10-CM

## 2022-09-12 DIAGNOSIS — D72.810 LYMPHOPENIA: ICD-10-CM

## 2022-09-12 DIAGNOSIS — J34.89 LESION OF NOSE: ICD-10-CM

## 2022-09-12 LAB
BASOPHILS ABSOLUTE: 0.02 K/UL (ref 0.01–0.08)
BASOPHILS RELATIVE PERCENT: 0.3 % (ref 0.1–1.2)
EOSINOPHILS ABSOLUTE: 0.11 K/UL (ref 0.04–0.54)
EOSINOPHILS RELATIVE PERCENT: 1.9 % (ref 0.7–7)
HCT VFR BLD CALC: 36.7 % (ref 40.1–51)
HEMOGLOBIN: 11.8 G/DL (ref 13.7–17.5)
LYMPHOCYTES ABSOLUTE: 0.85 K/UL (ref 1.18–3.74)
LYMPHOCYTES RELATIVE PERCENT: 14.6 % (ref 19.3–53.1)
MCH RBC QN AUTO: 31.3 PG (ref 25.7–32.2)
MCHC RBC AUTO-ENTMCNC: 32.2 G/DL (ref 32.3–36.5)
MCV RBC AUTO: 97.3 FL (ref 79–92.2)
MONOCYTES ABSOLUTE: 0.43 K/UL (ref 0.24–0.82)
MONOCYTES RELATIVE PERCENT: 7.4 % (ref 4.7–12.5)
NEUTROPHILS ABSOLUTE: 4.37 K/UL (ref 1.56–6.13)
NEUTROPHILS RELATIVE PERCENT: 75.3 % (ref 34–71.1)
PDW BLD-RTO: 14.3 % (ref 11.6–14.4)
PLATELET # BLD: 123 K/UL (ref 163–337)
PMV BLD AUTO: 10.3 FL (ref 7.4–10.4)
RBC # BLD: 3.77 M/UL (ref 4.63–6.08)
WBC # BLD: 5.81 K/UL (ref 4.23–9.07)

## 2022-09-12 PROCEDURE — 1123F ACP DISCUSS/DSCN MKR DOCD: CPT | Performed by: INTERNAL MEDICINE

## 2022-09-12 PROCEDURE — 96523 IRRIG DRUG DELIVERY DEVICE: CPT

## 2022-09-12 PROCEDURE — G8417 CALC BMI ABV UP PARAM F/U: HCPCS | Performed by: INTERNAL MEDICINE

## 2022-09-12 PROCEDURE — 85025 COMPLETE CBC W/AUTO DIFF WBC: CPT

## 2022-09-12 PROCEDURE — 1036F TOBACCO NON-USER: CPT | Performed by: INTERNAL MEDICINE

## 2022-09-12 PROCEDURE — 99212 OFFICE O/P EST SF 10 MIN: CPT

## 2022-09-12 PROCEDURE — 6360000002 HC RX W HCPCS: Performed by: INTERNAL MEDICINE

## 2022-09-12 PROCEDURE — 2580000003 HC RX 258: Performed by: INTERNAL MEDICINE

## 2022-09-12 PROCEDURE — G8427 DOCREV CUR MEDS BY ELIG CLIN: HCPCS | Performed by: INTERNAL MEDICINE

## 2022-09-12 PROCEDURE — 99213 OFFICE O/P EST LOW 20 MIN: CPT | Performed by: INTERNAL MEDICINE

## 2022-09-12 RX ORDER — PERPHENAZINE 16 MG/1
TABLET, FILM COATED ORAL
COMMUNITY
Start: 2022-06-17

## 2022-09-12 RX ORDER — HEPARIN SODIUM (PORCINE) LOCK FLUSH IV SOLN 100 UNIT/ML 100 UNIT/ML
500 SOLUTION INTRAVENOUS PRN
Status: DISCONTINUED | OUTPATIENT
Start: 2022-09-12 | End: 2022-09-13 | Stop reason: HOSPADM

## 2022-09-12 RX ORDER — HEPARIN SODIUM (PORCINE) LOCK FLUSH IV SOLN 100 UNIT/ML 100 UNIT/ML
500 SOLUTION INTRAVENOUS PRN
OUTPATIENT
Start: 2022-09-12

## 2022-09-12 RX ORDER — VITAMIN E 268 MG
400 CAPSULE ORAL DAILY
COMMUNITY

## 2022-09-12 RX ORDER — MULTIVIT-MIN/BIOTIN/HERBAL 194 300 MCG
2 CAPSULE ORAL 2 TIMES DAILY
COMMUNITY

## 2022-09-12 RX ORDER — SODIUM CHLORIDE 0.9 % (FLUSH) 0.9 %
5-40 SYRINGE (ML) INJECTION PRN
Status: DISCONTINUED | OUTPATIENT
Start: 2022-09-12 | End: 2022-09-13 | Stop reason: HOSPADM

## 2022-09-12 RX ORDER — FAMOTIDINE 20 MG/1
20 TABLET, FILM COATED ORAL 2 TIMES DAILY
COMMUNITY
Start: 2022-06-02

## 2022-09-12 RX ORDER — SODIUM CHLORIDE 9 MG/ML
25 INJECTION, SOLUTION INTRAVENOUS PRN
OUTPATIENT
Start: 2022-09-12

## 2022-09-12 RX ORDER — AZELASTINE HYDROCHLORIDE 137 UG/1
SPRAY, METERED NASAL
COMMUNITY
Start: 2022-07-11

## 2022-09-12 RX ORDER — SODIUM CHLORIDE 0.9 % (FLUSH) 0.9 %
5-40 SYRINGE (ML) INJECTION PRN
OUTPATIENT
Start: 2022-09-12

## 2022-09-12 RX ADMIN — SODIUM CHLORIDE, PRESERVATIVE FREE 20 ML: 5 INJECTION INTRAVENOUS at 08:45

## 2022-09-12 RX ADMIN — HEPARIN 500 UNITS: 100 SYRINGE at 08:45

## 2022-10-24 RX ORDER — AZELASTINE HYDROCHLORIDE 137 UG/1
SPRAY, METERED NASAL
Qty: 30 ML | Refills: 1 | Status: SHIPPED | OUTPATIENT
Start: 2022-10-24 | End: 2022-11-15

## 2022-11-10 ENCOUNTER — HOSPITAL ENCOUNTER (OUTPATIENT)
Dept: INFUSION THERAPY | Age: 82
Discharge: HOME | End: 2022-11-10
Payer: MEDICARE

## 2022-11-10 DIAGNOSIS — C85.90 NON-HODGKIN'S LYMPHOMA, UNSPECIFIED BODY REGION, UNSPECIFIED NON-HODGKIN LYMPHOMA TYPE (HCC): Primary | ICD-10-CM

## 2022-11-10 PROCEDURE — 2580000003 HC RX 258: Performed by: INTERNAL MEDICINE

## 2022-11-10 PROCEDURE — 96523 IRRIG DRUG DELIVERY DEVICE: CPT

## 2022-11-10 PROCEDURE — 6360000002 HC RX W HCPCS: Performed by: INTERNAL MEDICINE

## 2022-11-10 RX ORDER — SODIUM CHLORIDE 0.9 % (FLUSH) 0.9 %
5-40 SYRINGE (ML) INJECTION PRN
OUTPATIENT
Start: 2022-11-10

## 2022-11-10 RX ORDER — HEPARIN SODIUM (PORCINE) LOCK FLUSH IV SOLN 100 UNIT/ML 100 UNIT/ML
500 SOLUTION INTRAVENOUS PRN
Status: DISCONTINUED | OUTPATIENT
Start: 2022-11-10 | End: 2022-11-11 | Stop reason: HOSPADM

## 2022-11-10 RX ORDER — SODIUM CHLORIDE 9 MG/ML
25 INJECTION, SOLUTION INTRAVENOUS PRN
OUTPATIENT
Start: 2022-11-10

## 2022-11-10 RX ORDER — WATER 1000 ML/1000ML
2.2 INJECTION, SOLUTION INTRAVENOUS ONCE
OUTPATIENT
Start: 2022-11-10 | End: 2022-11-10

## 2022-11-10 RX ORDER — HEPARIN SODIUM (PORCINE) LOCK FLUSH IV SOLN 100 UNIT/ML 100 UNIT/ML
500 SOLUTION INTRAVENOUS PRN
OUTPATIENT
Start: 2022-11-10

## 2022-11-10 RX ORDER — SODIUM CHLORIDE 0.9 % (FLUSH) 0.9 %
5-40 SYRINGE (ML) INJECTION PRN
Status: DISCONTINUED | OUTPATIENT
Start: 2022-11-10 | End: 2022-11-11 | Stop reason: HOSPADM

## 2022-11-10 RX ADMIN — Medication 10 ML: at 14:53

## 2022-11-10 RX ADMIN — HEPARIN 500 UNITS: 100 SYRINGE at 14:53

## 2022-11-15 RX ORDER — AZELASTINE HYDROCHLORIDE 137 UG/1
SPRAY, METERED NASAL
Qty: 30 ML | Refills: 3 | Status: SHIPPED | OUTPATIENT
Start: 2022-11-15 | End: 2023-02-16

## 2022-12-16 ENCOUNTER — OFFICE VISIT (OUTPATIENT)
Dept: CARDIOLOGY CLINIC | Age: 82
End: 2022-12-16

## 2022-12-16 VITALS
DIASTOLIC BLOOD PRESSURE: 60 MMHG | SYSTOLIC BLOOD PRESSURE: 130 MMHG | HEART RATE: 51 BPM | HEIGHT: 72 IN | OXYGEN SATURATION: 97 % | BODY MASS INDEX: 26.14 KG/M2 | WEIGHT: 193 LBS

## 2022-12-16 DIAGNOSIS — I48.0 PAROXYSMAL ATRIAL FIBRILLATION (HCC): Primary | ICD-10-CM

## 2022-12-16 RX ORDER — CARVEDILOL 6.25 MG/1
TABLET ORAL
COMMUNITY
Start: 2022-09-28

## 2022-12-16 RX ORDER — TRIAMCINOLONE ACETONIDE 1 MG/G
CREAM TOPICAL
COMMUNITY
Start: 2022-12-01

## 2022-12-16 RX ORDER — MAGNESIUM OXIDE 400 MG/1
400 TABLET ORAL DAILY
COMMUNITY

## 2022-12-16 ASSESSMENT — ENCOUNTER SYMPTOMS
WHEEZING: 0
VOMITING: 0
COUGH: 0
NAUSEA: 0
EYE PAIN: 0
BLOOD IN STOOL: 0
EYE REDNESS: 0
DIARRHEA: 0
EYE DISCHARGE: 0
ABDOMINAL DISTENTION: 0
ABDOMINAL PAIN: 0
SHORTNESS OF BREATH: 0
SORE THROAT: 0
CONSTIPATION: 0
APNEA: 0
FACIAL SWELLING: 0
CHEST TIGHTNESS: 0

## 2022-12-16 NOTE — PROGRESS NOTES
Cardiology Office Visit Note  [address]  [id number]  Phone: [phone]  Fax: [phone]                            Date:  12/16/2022  Patient: Charity Bullock  Age:  80 y.o., 1940    Referral: No ref. provider found    REASON FOR VISIT:  Follow-up (Paroxysmal atrial fibrillation [facility]))         PROBLEM LIST:    Patient Active Problem List    Diagnosis Date Noted    Chest pain 08/08/2021    Agent orange exposure     Atrial fibrillation ([address]     Diabetes mellitus ([facility]     Hyperlipidemia     Hypertension     Non Hodgkin's lymphoma ([facility]     BPH (benign prostatic hyperplasia)     Hypothyroidism     Chronic sinusitis          PRESENTATION: Charity Bullock is a 80y.o. year old male is seen today for routine cardiology follow-up visit. His past medical history significant for paroxysmal atrial fibrillation, hypertension, dyslipidemia, diabetes mellitus and non-Hodgkin's lymphoma. He has been on Eliquis for thromboembolic risk reduction without any reports of GI bleeding. He has had minor falls over the years related to chronic gait instability status post CVA in 2017. His last fall was approximately a month ago, accidental without major trauma. He does not currently use a cane or walker. No complaints of chest pain, shortness of breath, palpitations or diaphoresis. He informs me that he maintains a fairly active lifestyle for his age. REVIEW OF SYSTEMS:  Review of Systems   Constitutional:  Negative for chills, fatigue and fever. HENT:  Negative for congestion, facial swelling, hearing loss and sore throat. Eyes:  Negative for pain, discharge, redness and visual disturbance. Respiratory:  Negative for apnea, cough, chest tightness, shortness of breath and wheezing. Cardiovascular:  Negative for chest pain, palpitations and leg swelling.    Gastrointestinal:  Negative for abdominal distention, abdominal pain, blood in stool, constipation, diarrhea, nausea and vomiting. Endocrine: Negative for polydipsia, polyphagia and polyuria. Genitourinary:  Negative for dysuria, flank pain, frequency and hematuria. Musculoskeletal:  Negative for joint swelling, myalgias and neck pain. Skin:  Negative for pallor and rash. Neurological:  Negative for dizziness, syncope, speech difficulty, light-headedness, numbness and headaches. Psychiatric/Behavioral:  Negative for confusion, hallucinations and sleep disturbance.       Past Medical History:      Diagnosis Date    Agent orange exposure     Atrial fibrillation ([facility]     Diabetes mellitus ([facility]     Hyperlipidemia     Hypertension     Non Hodgkin's lymphoma ([facility]     Non-Hodgkin lymphoma ([facility]     Skin cancer     right low cheek       Past Surgical History:      Procedure Laterality Date    ANKLE SURGERY      CARDIAC CATHETERIZATION      JOINT REPLACEMENT      SHOULDER ARTHROPLASTY         Medications:  Current Outpatient Medications   Medication Sig Dispense Refill    magnesium oxide (MAG-OX) 400 MG tablet Take 400 mg by mouth daily      carvedilol (COREG) 6.25 MG tablet TAKE 1 TABLET BY MOUTH TWICE DAILY WITH FOOD      triamcinolone (KENALOG) 0.1 % cream APPLY TWICE DAILY FOR 14 DAYS      UNABLE TO FIND Gluconite metabolism & sleep support 1 scoop 1 x a day at night      UNABLE TO FIND Gluco Shield Pro 1 Capsule 1 x a day      vitamin E 400 UNIT capsule Take 400 Units by mouth daily      atorvastatin (LIPITOR) 80 MG tablet Take 1 tablet by mouth nightly 30 tablet 1    amLODIPine (NORVASC) 10 MG tablet Take 1 tablet by mouth daily 30 tablet 5    amiodarone (CORDARONE) 200 MG tablet Take 1 tablet by mouth daily 30 tablet 5    apixaban (ELIQUIS) 2.5 MG TABS tablet Take 2 tablets by mouth 2 times daily 60 tablet 5    olmesartan (BENICAR) 40 MG tablet Take 1 tablet by mouth daily 30 tablet 5    levothyroxine (SYNTHROID) 88 MCG tablet Take 88 mcg by mouth Daily      CINNAMON PO Take 4,000 mg by mouth 2 times daily      MULTIPLE VITAMIN PO Take 1 tablet by mouth nightly      Cholecalciferol (VITAMIN D) 50 MCG (2000 UT) CAPS capsule Take by mouth nightly       Vitamin D, Cholecalciferol, 25 MCG (1000 UT) TABS Take 2 tablets by mouth nightly      hydroCHLOROthiazide (HYDRODIURIL) 25 MG tablet Take 25 mg by mouth daily      tamsulosin (FLOMAX) 0.4 MG capsule Take 0.4 mg by mouth nightly      Cyanocobalamin (VITAMIN B 12 PO) Take 5,000 mcg by mouth daily      fluticasone (FLONASE) 50 MCG/ACT nasal spray 1 spray by Each Nostril route daily as needed for Rhinitis or Allergies      ascorbic acid (VITAMIN C) 500 MG tablet Take 2,000 mg by mouth daily       Azelastine HCl 137 MCG/SPRAY SOLN 2 SPRAYS INTO EACH NOSTRIL AS DIRECTED BY PROVIDER 2 (TWO) TIMES A DAY. (Patient not taking: Reported on 2022)      famotidine (PEPCID) 20 MG tablet Take 20 mg by mouth 2 times daily (Patient not taking: Reported on 2022)      CONTOUR NEXT TEST strip TO TEST BLOOD SUGAR ONCE DAILY DX CODE E11.69 90 DAYS (Patient not taking: Reported on 2022)      Specialty Vitamins Products (INULOSE BLOOD SUGAR SUPPORT) CAPS Take 2 capsules by mouth in the morning and at bedtime (Patient not taking: Reported on 2022)      mupirocin (BACTROBAN) 2 % ointment Apply topically 3 times daily Apply topically 3 times daily. (Patient not taking: Reported on 2022)      terbinafine (LAMISIL) 250 MG tablet Take 250 mg by mouth daily (Patient not taking: No sig reported)      Multiple Vitamins-Minerals (EYE VITAMINS PO) Take 1 tablet by mouth nightly OcuXanthin OTC supplement (Patient not taking: Reported on 2022)       No current facility-administered medications for this visit. Allergies:  Patient has no known allergies.     Social History:  Social History     Occupational History    Not on file   Tobacco Use    Smoking status: Former     Types: Cigarettes     Quit date: 1976     Years since quittin.8    Smokeless tobacco: Never   Vaping Use    Vaping Use: Never used   Substance and Sexual Activity    Alcohol use: Not Currently    Drug use: Never    Sexual activity: Not on file         Family History:       Problem Relation Age of Onset    Cancer Brother         Lung         Physical Examination:  /60   Pulse 51   Ht 6' (1.829 m)   Wt 193 lb (87.5 kg)   SpO2 97%   BMI 26.18 kg/m²   Physical Exam  Vitals reviewed. Constitutional:       General: He is not in acute distress. Appearance: Normal appearance. He is not ill-appearing, toxic-appearing or diaphoretic. HENT:      Head: Normocephalic and atraumatic. Eyes:      General: No scleral icterus. Right eye: No discharge. Left eye: No discharge. Conjunctiva/sclera: Conjunctivae normal.   Neck:      Vascular: No carotid bruit. Cardiovascular:      Rate and Rhythm: Normal rate and regular rhythm. No extrasystoles are present. Chest Wall: PMI is not displaced. No thrill. Heart sounds: S1 normal and S2 normal. No murmur heard. No friction rub. No gallop. Pulmonary:      Effort: Pulmonary effort is normal. No tachypnea or respiratory distress. Breath sounds: Normal breath sounds. No stridor. No wheezing, rhonchi or rales. Chest:      Chest wall: No tenderness. Abdominal:      General: Bowel sounds are normal. There is no distension. Palpations: Abdomen is soft. There is no mass. Tenderness: There is no abdominal tenderness. There is no guarding. Musculoskeletal:         General: No swelling. Cervical back: Normal range of motion and neck supple. No rigidity. Right lower leg: No edema. Left lower leg: No edema. Skin:     General: Skin is warm and dry. Coloration: Skin is not jaundiced. Findings: No erythema or rash. Neurological:      General: No focal deficit present. Mental Status: He is alert and oriented to person, place, and time. Mental status is at baseline.    Psychiatric:         Mood and Affect: Mood normal.         Behavior: Behavior normal.         Thought Content: Thought content normal.         Labs:     PT/INR:   Protime   Date Value Ref Range Status   08/08/2021 14.6 12.0 - 14.6 sec Final     INR   Date Value Ref Range Status   08/08/2021 1.12 0.88 - 1.18 Final     Comment:     INR  < or = 1.3  Normal  INR = 2.0 - 3.0  Therapeutic  INR = 2.5 - 3.5  Therapeutic for patients with mechanical  prosthetic heart valve & MI prophylaxis  INR  > or = 3.5  Abnormal/Elevated  INR  > or = 5.0  Critical (requires immediate physician  notification)         APTT:    aPTT   Date Value Ref Range Status   08/08/2021 26.8 26.0 - 36.2 sec Final      CARDIAC ENZYMES:   Troponin   Date Value Ref Range Status   08/09/2021 <0.01 0.00 - 0.03 ng/mL Final     Comment:     <0.030 ng/ml       No measurable cardiac damage. 0.030-0.099 ng/ml  Values of troponin in this range suggest  possible myocardial damage. Repeat assay  4 to 6 hours after the current specimen.    >= 0.100 ng/ml     Indicative of myocardial damage. Recommend continued monitoring of  patient status and cardiac markers. LIPID PANEL: No results found for: [facility]  LIVER PROFILE:   AST   Date Value Ref Range Status   12/08/2021 31 17 - 59 U/L Final     ALT   Date Value Ref Range Status   12/08/2021 24 21 - 72 U/L Final     Albumin   Date Value Ref Range Status   12/08/2021 4.3 3.5 - 5.2 g/dL Final          Twelve-lead EKG, sinus bradycardia 51 bpm.      2D echocardiogram with Doppler 8/8/2021  Indications:Chest pain and Atrial fibrillation. Summary   LV is normal in size with normal systolic function. LV ejection fraction   estimated at 60%. Diastolic function appears preserved. RV is normal in size with normal systolic function. Normal left atrial size. Normal right atrial size. Aortic valve is trileaflet with mild leaflet thickening but preserved   mobility. No stenosis or significant regurgitation.    Mitral valve appears structurally normal with normal leaflet mobility. Trace mitral regurgitation. No stenosis. No significant tricuspid regurgitation noted. No significant pericardial effusion. Signature      ----------------------------------------------------------------   Electronically signed by Elzbieta Mann MD(Interpreting physician)   on 08/09/2021 10:09 PM      ASSESSMENT and PLAN:      Paroxysmal atrial fibrillation, currently in sinus rhythm, on amiodarone for rhythm maintenance  Chronic anticoagulation therapy for thromboembolic risk reduction. No major bleeds. Has had nontraumatic falls related to gait instability. Reiterated importance of using cane to improve stability. Cerebrovascular accident, 2017  Essential hypertension, goal blood pressure less than 130/80. Medications reviewed, continue amlodipine, olmesartan and carvedilol as well as hydrochlorothiazide. Dyslipidemia, goal LDL less than 70. Continue atorvastatin. Surveillance monitoring of LFTs and lipids as per PCP. No known coronary artery disease, negative nuclear medicine stress test, 2021        Electronically signed by Amina Arriaga MD on 12/16/2022 at 8:47 AM    Amina Arriaga MD, RAYMUNDO, [facility]  Noninvasive Cardiology Consultant    This dictation was generated by voice recognition computer software. Although all attempts are made to edit the dictation for accuracy, there may be errors in the transcription that are not intended.

## 2023-01-06 DIAGNOSIS — C83.18 MANTLE CELL LYMPHOMA OF LYMPH NODES OF MULTIPLE REGIONS (HCC): Primary | ICD-10-CM

## 2023-01-06 NOTE — PROGRESS NOTES
MEDICAL ONCOLOGY PROGRESS NOTE    Pt Name: Rola Vargas  MRN: [id number]  YOB: 1940  Date of evaluation: 1/9/2023      HISTORY OF PRESENT ILLNESS:    The patient has a diagnosis of mantle cell lymphoma in 2017. He received 6 cycles of bendamustine/rituximab followed by 2 years of rituximab maintenance completed December 2019. He has been in remission. He was seen by me recently due to findings of pancytopenia. His cytopenias have been stable. His leukopenia is mostly due to lymphopenia secondary to rituxima. He denies any new peripheral adenopathy. Denies any new issues since last visit. Diagnosis  B-cell lymphoma, consistent with Mantle cell lymphoma, June 2017  CD20+  Translocation 11, 14  Cyclin D1 positive  SOX-11 positive    Treatment Summary  8/2/17 - 12/19/17 Completion Bendamustine/Rituxan x6 cycles  2/16/18 - 12/18/19 Completion Maintenance Rituxan every 2 months    Hematology/Cancer History  Nurys Arechiga was first seen by me on 12/8/2021. He presents to [facility] continued of care of a history of mantle cell lymphoma. He was treated in [place], [place]. He received induction chemotherapy with bendamustine/rituximab followed by 2 years of maintenance rituximab.  12/16/16 US Abdominal ([facility], [place]):  20 cm x 9.7 cm splenomegaly  5/19/17 CT chest, abdomen, pelvis ([address], [place]): Splenomegaly is similar to the previous exam. No evidence of adenopathy involving the chest abdomen or pelvis. Small nonspecific granular nodule the right upper lobe. Follow-up imaging to assess stability recommended. 6/26/17 Bone marrow ([facility] [place], [place]): Mildly hypercellular, 40%. CD20+ B-cell lymphoma consistent with mantle cell lymphoma. B-cell lymphoma involves 45%-50% of marrow cellularity. Trilineage hematopoiesis with adequate megakaryocytes. Iron increased (3+/4+). FLOW: CD20+, CD5+, monoclonal B-cell population.  Monoclonal B-cell population is CD5+, CD19+, Bright  CD20+, Lambda+, CD23 dim and involves 17% of events. No increased blast population. KARYOTYPE: 45,X,-Y [4]/46,XY[16]. FISH: IGH/BCL1 translocation was detected. MOLECULAR: IgVh somatic hypermutation analysis failed to detect a prominent clonal B-cell population. COMMENT: The monoclonal B-cell population demonstrates weak staining with Cyclin D1, most suggestive of mantle cell lymphoma. FISH for t(11;14) has been requested for definitive evaluation as mantle cell lymphoma. SOX-11 IHC stain is positive in the lymphocytes, confirming classical  Type (aggressive) mantle cell lymphoma. 7/25/17 PET scan ([address], [place]): No focal area of abnormal uptake. Splenomegaly. 7/28/17 MUGA scan ([address], [place]): Normal LVEF of 66%. No wall motion abnormalities. 10/23/17 CT chest, abdomen, pelvis ([address], [place]): Decrease in splenomegaly. No lymphadenopathy or other definite suspicious findings. Unchanged tiny bilateral pulmonary nodules, favored to be benign. Attention on follow-up recommended. Colonic diverticulosis. Prostatomegaly. 8/2/17 - 12/19/17 CompletionTreanda Rituxan x6 cycles  2/9/18 CT neck, chest, abdomen, pelvis ([address], [place]): No acute abnormality. No significant change in splenomegaly. No  Lymphadenopathy. Redemonstration of incidental findings to include colonic diverticulosis, prostatomegaly, coronary atherosclerotic disease, and tiny likely benign bilateral pulmonary nodules. New mild bilateral lower lobe reticular ground-glass opacities, infectious/inflammatory in appearance. 2/16/18 - 12/18/19 Completion Maintenance Rituxan every 2 months  9/26/18 CT neck, chest, abdomen, pelvis ([address], [place]): No CT evidence of malignancy, lymphadenopthay or metastatic disease within the neck, chest, abdomen, pelvis. No significant change in mild splenomegaly.   11/15/18 MRI [facility] [place] [facility]): Multilevel degenerative disc and facet disease. Multilevel bilateral neural foraminal narrowing, greatest at C5-C6 where it is severe bilaterally. The vocal cords are opposed, limiting the evaluation of the neck area for a mass. No CT evidence of malignancy, lymphadenopthay or metastatic disease within the neck, chest, abdomen, pelvis. The spleen is now normal size. 5/9/19 CT neck, chest, abdomen, pelvis ([address], [place]): No CT evidence of malignancy, lymphadenopthay or metastatic disease within the neck, chest, abdomen, pelvis. 12/17/19 CT neck, chest, abdomen, pelvis ([address], [place]): No CT evidence of malignancy, lymphadenopthay or metastatic disease within the neck, chest, abdomen, pelvis. 6/9/20 CT chest, abdomen, pelvis ([address], [place]): No CT evidence of malignancy, lymphadenopthay or metastatic disease within the chest, abdomen, pelvis. 6/9/20  IgG 597.1, IgA 72.9, IgM 58, Kappa 18.32, Lambda 12.67, Kappa/Lambda ratio 1.45  10/27/20  IgG 631.1, IgA 95.3, IgM 63, Kappa 18.37, Lambda 13.12, Kappa/Lambda ratio 1.40  2/23/21  IgG 596, IgA 83.1, IgM 65,  3/23/21 CT chest, abdomen, pelvis ([address], [place]): No CT evidence of malignancy, lymphadenopathy or metastatic disease within the chest, abdomen, pelvis. Extensive coronary artery calcifications. 6/22/21- IgG 534.6, IgA 91.8, IgM 59, Kappa 19.25, Lambda 16.27, Kappa/Lambda ratio 1.18  12/8/2021-she was first seen by me. Recommend continued surveillance. 4/8/2022 CT Abd/Pelvis W IV Contrast ([place]) No acute abnormality of the abdomen or pelvis, particularly, no evidence of a neoplastic process or metastatic disease. No mass or lymphadenopathy. The stable low density nodules in both kidneys. The nodule in the lower pole of the right kidney medially probably represent a complicated cyst. A follow-up study in 6 months may be obtained to ensure stability. A stable tiny low-density nodule in the uncinate process of thehead of the pancreas. Another follow-up examination in one year may be obtained for comparison and ensure stability. Enlarged prostate. 4/8/2022 CT Chest W Contrast Mild chronic lung changes. No mass or lymphadenopathy is seen within the chest.  4/11/2022-I reviewed CT chest abdomen pelvis. No evidence of lymphoma recurrence. Small nodule in the head of the pancreas. Follow-up 1 year recommended. 6/29/2022 Skin from [facility]) Nodular basal cell carcinoma occurring on solar damaged skin. Deep and peripheral margins negative for tumor in plane of section examined. Past Medical History:    Past Medical History:   Diagnosis Date    Agent orange exposure     Atrial fibrillation ([address]     Diabetes mellitus ([address]     Hyperlipidemia     Hypertension     Non Hodgkin's lymphoma ([address]     Non-Hodgkin lymphoma ([address]     Skin cancer     right low cheek       Past Surgical History:    Past Surgical History:   Procedure Laterality Date    ANKLE SURGERY      CARDIAC CATHETERIZATION      JOINT REPLACEMENT      SHOULDER ARTHROPLASTY         Social History:    Marital status:    Smoking status:Former;[id number]  ETOH status:No  Resides: [place], [place]  Prior exposure round-up and Asbestos, agent orange    Family History:   Brother, Lung cancer    Current Hospital Medications:    Current Outpatient Medications   Medication Sig Dispense Refill    NONFORMULARY PER PATIENT HE TAKES ONE IN MORNING AND ONE IN NIGHT      magnesium oxide (MAG-OX) 400 MG tablet Take 400 mg by mouth daily      carvedilol (COREG) 6.25 MG tablet TAKE 1 TABLET BY MOUTH TWICE DAILY WITH FOOD      Azelastine HCl 137 MCG/SPRAY SOLN       CONTOUR NEXT TEST strip       UNABLE TO FIND Gluconite metabolism & sleep support 1 scoop 1 x a day at night      UNABLE TO FIND Gluco Shield Pro 1 Capsule 1 x a day      vitamin E 400 UNIT capsule Take 400 Units by mouth daily      mupirocin (BACTROBAN) 2 % ointment Apply topically 3 times daily Apply topically 3 times daily.       amLODIPine (NORVASC) 10 MG tablet Take 1 tablet by mouth daily 30 tablet 5    amiodarone (CORDARONE) 200 MG tablet Take 1 tablet by mouth daily 30 tablet 5    apixaban (ELIQUIS) 2.5 MG TABS tablet Take 2 tablets by mouth 2 times daily 60 tablet 5    olmesartan (BENICAR) 40 MG tablet Take 1 tablet by mouth daily 30 tablet 5    levothyroxine (SYNTHROID) 88 MCG tablet Take 88 mcg by mouth Daily      CINNAMON PO Take 4,000 mg by mouth 2 times daily      MULTIPLE VITAMIN PO Take 1 tablet by mouth nightly      Cholecalciferol (VITAMIN D) 50 MCG (2000 UT) CAPS capsule Take by mouth nightly       Vitamin D, Cholecalciferol, 25 MCG (1000 UT) TABS Take 2 tablets by mouth nightly      hydroCHLOROthiazide (HYDRODIURIL) 25 MG tablet Take 25 mg by mouth daily      Cyanocobalamin (VITAMIN B 12 PO) Take 5,000 mcg by mouth daily      fluticasone (FLONASE) 50 MCG/ACT nasal spray 1 spray by Each Nostril route daily as needed for Rhinitis or Allergies      Multiple Vitamins-Minerals (EYE VITAMINS PO) Take 1 tablet by mouth nightly OcuXanthin OTC supplement      ascorbic acid (VITAMIN C) 500 MG tablet Take 2,000 mg by mouth daily       triamcinolone (KENALOG) 0.1 % cream APPLY TWICE DAILY FOR 14 DAYS (Patient not taking: Reported on 1/9/2023)      famotidine (PEPCID) 20 MG tablet Take 20 mg by mouth 2 times daily (Patient not taking: No sig reported)      Specialty Vitamins Products (INULOSE BLOOD SUGAR SUPPORT) CAPS Take 2 capsules by mouth in the morning and at bedtime (Patient not taking: No sig reported)      atorvastatin (LIPITOR) 80 MG tablet Take 1 tablet by mouth nightly (Patient not taking: Reported on 1/9/2023) 30 tablet 1    tamsulosin (FLOMAX) 0.4 MG capsule Take 0.4 mg by mouth nightly (Patient not taking: Reported on 1/9/2023)      terbinafine (LAMISIL) 250 MG tablet Take 250 mg by mouth daily (Patient not taking: No sig reported)       No current facility-administered medications for this visit. Allergies: No Known Allergies      Subjective   REVIEW OF SYSTEMS:   CONSTITUTIONAL: no fever, no night sweats, weakness,fatigue;  HEENT: no blurring of vision, no double vision, no hearing difficulty, no tinnitus, no ulceration, no dysplasia, no epistaxis;   LUNGS: no cough, no hemoptysis, no wheeze,  no shortness of breath;  CARDIOVASCULAR: no palpitation, no chest pain, no shortness of breath;  GI: no abdominal pain, no nausea, no vomiting, no diarrhea, no constipation;  SON: no dysuria, no hematuria, no frequency or urgency, no nephrolithiasis;  MUSCULOSKELETAL: no joint pain, no swelling, no stiffness;  ENDOCRINE: no polyuria, no polydipsia, no cold or heat intolerance;  HEMATOLOGY: no easy bruising or bleeding, no history of clotting disorder;  DERMATOLOGY: no skin rash, no eczema, no pruritus;  PSYCHIATRY: no depression, no anxiety, no panic attacks, no suicidal ideation, no homicidal ideation;  NEUROLOGY: no syncope, no seizures, no numbness or tingling of hands, no numbness or tingling of feet, no paresis;      Objective   /62   Pulse 84   Temp 97.8 °F (36.6 °C)   Wt 199 lb 1.6 oz (90.3 kg)   SpO2 95%   BMI 27.00 kg/m²     PHYSICAL EXAM:  CONSTITUTIONAL: Alert, appropriate, no acute distress  EYES: Non icteric, EOM intact, pupils equal round   ENT: Mucus membranes moist, no oral pharyngeal lesions, external inspection of ears and nose are normal.  NECK: Supple, no masses. No palpable thyroid mass  CHEST/LUNGS: CTA bilaterally, normal respiratory effort   CARDIOVASCULAR: RRR, no murmurs. No lower extremity edema  ABDOMEN: soft non-tender, active bowel sounds, no HSM. No palpable masses  EXTREMITIES: warm, full ROM in all 4 extremities, no focal weakness. SKIN: warm, dry with no rashes or lesions  LYMPH: No cervical, clavicular, axillary, or inguinal lymphadenopathy  NEUROLOGIC: follows commands, non focal   PSYCH: mood and affect appropriate.   Alert and oriented to time, place, person    LABORATORY RESULTS REVIEWED/ANALYZED BY ME:  1/9/23 CBC  WBC 4.36  HGB 12.3    Neut 2.82    RADIOLOGY STUDIES REVIEWED BY ME:  None    ASSESSMENT:    No orders of the defined types were placed in this encounter. Vandana Powers was seen today for follow-up. Diagnoses and all orders for this visit:    Mantle cell lymphoma of lymph nodes of multiple regions [facility])    Basal cell carcinoma (BCC) of skin of face, unspecified part of face    Care plan discussed with patient    Mantle cell lymphoma, SOX11 positive, June 2017  Essentially, status post completion of induction chemotherapy with bendamustine/rituximab followed by 2 years of maintenance rituximab completed December 2019. He is in clinical/radiologic complete response.  -Continue clinical/short-term surveillance  -Repeat CT chest, abdomen, pelvis March 2022- no evidence of lymphoma recurrence  --Repeat CT chest/abdomen-pelvis April 2023  -No evidence of lymphoma recurrence  -No clinical evidence of recurrence today. No B symptoms. No peripheral neuropathy. Pancytopenia-overall, stable  WBC 3.11, , ANC 2.08  Hemoglobin 12.2 and platelet count 070,121. These numbers have been stable for at least a year. The lymphopenia is due to prior rituximab treatment. Platelet counts have been overall stable over the last year. Therefore, no further work-up at this time. We will continue to monitor his blood counts. Environmental exposure-[place]/agent orange. This may have contributed to his diagnosis of lymphoma. More likely than not. A. fib-continue Eliquis. Advised compliance. Skin lesion over right mandibular region-referral to dermatology. -6/29/2022-basal cell carcinoma    Small pancreatic nodule  -Follow-up CT scan 1 year.     PLAN:  RTC with MD after CT scans in April  Recommend CT Chest, Abdomen, Pelvis in April 2023  Port Flush today and every 8 weeks  Continue follow-up with Dr. Allie Ambrose ENT-f/u 3/3/23  Continue follow up with /Cardiology  Continue follow-up with Dr. Samm Will Dermatology    I, Ramiro Wilburn am pre charting  as Medical Assistant for Adrienne Weldon MD. Electronically signed by Ramiro Wilburn MA on 1/9/2023 at 1:03 PM CST. Corrinne Loyal, am scribing for Adrienne Weldon MD. Electronically signed by Fred Clark RN on 1/9/2023 at 10:31 AM CST. I, Dr Franca Muro, personally performed the services described in this documentation as scribed by Fred Clark RN in my presence and is both accurate and complete. I have seen, examined and reviewed this patient medication list, appropriate labs and imaging studies. I reviewed relevant medical records and others physicians notes. I discussed the plans of care with the patient. I answered all the questions to the patients satisfaction. I have also reviewed the chief complaint (CC) and part of the history (History of Present Illness (HPI), Past Family Social History [facility]), or Review of Systems (ROS) and made changes when appropriated. (Please note that portions of this note were completed with a voice recognition program. Efforts were made to edit the dictations but occasionally words are mis-transcribed. )Electronically signed by Adrienne Weldon MD on 1/9/2023 at 10:31 AM

## 2023-01-09 ENCOUNTER — HOSPITAL ENCOUNTER (OUTPATIENT)
Dept: INFUSION THERAPY | Age: 83
Discharge: HOME | End: 2023-01-09
Payer: MEDICARE

## 2023-01-09 ENCOUNTER — OFFICE VISIT (OUTPATIENT)
Dept: HEMATOLOGY | Age: 83
End: 2023-01-09
Payer: MEDICARE

## 2023-01-09 VITALS
TEMPERATURE: 97.8 F | BODY MASS INDEX: 27 KG/M2 | OXYGEN SATURATION: 95 % | WEIGHT: 199.1 LBS | SYSTOLIC BLOOD PRESSURE: 130 MMHG | HEART RATE: 84 BPM | DIASTOLIC BLOOD PRESSURE: 62 MMHG

## 2023-01-09 DIAGNOSIS — C85.90 NON-HODGKIN'S LYMPHOMA, UNSPECIFIED BODY REGION, UNSPECIFIED NON-HODGKIN LYMPHOMA TYPE (HCC): ICD-10-CM

## 2023-01-09 DIAGNOSIS — C83.18 MANTLE CELL LYMPHOMA OF LYMPH NODES OF MULTIPLE REGIONS (HCC): Primary | ICD-10-CM

## 2023-01-09 DIAGNOSIS — Z71.89 CARE PLAN DISCUSSED WITH PATIENT: ICD-10-CM

## 2023-01-09 DIAGNOSIS — Z08 ENCOUNTER FOR FOLLOW-UP SURVEILLANCE OF LYMPHOMA: ICD-10-CM

## 2023-01-09 DIAGNOSIS — C83.18 MANTLE CELL LYMPHOMA OF LYMPH NODES OF MULTIPLE SITES (HCC): Primary | ICD-10-CM

## 2023-01-09 DIAGNOSIS — Z85.72 ENCOUNTER FOR FOLLOW-UP SURVEILLANCE OF LYMPHOMA: ICD-10-CM

## 2023-01-09 LAB
BASOPHILS ABSOLUTE: 0.03 K/UL (ref 0.01–0.08)
BASOPHILS RELATIVE PERCENT: 0.7 % (ref 0.1–1.2)
EOSINOPHILS ABSOLUTE: 0.12 K/UL (ref 0.04–0.54)
EOSINOPHILS RELATIVE PERCENT: 2.8 % (ref 0.7–7)
HCT VFR BLD CALC: 38.3 % (ref 40.1–51)
HEMOGLOBIN: 12.3 G/DL (ref 13.7–17.5)
LYMPHOCYTES ABSOLUTE: 0.93 K/UL (ref 1.18–3.74)
LYMPHOCYTES RELATIVE PERCENT: 21.3 % (ref 19.3–53.1)
MCH RBC QN AUTO: 31 PG (ref 25.7–32.2)
MCHC RBC AUTO-ENTMCNC: 32.1 G/DL (ref 32.3–36.5)
MCV RBC AUTO: 96.5 FL (ref 79–92.2)
MONOCYTES ABSOLUTE: 0.43 K/UL (ref 0.24–0.82)
MONOCYTES RELATIVE PERCENT: 9.9 % (ref 4.7–12.5)
NEUTROPHILS ABSOLUTE: 2.82 K/UL (ref 1.56–6.13)
NEUTROPHILS RELATIVE PERCENT: 64.6 % (ref 34–71.1)
PDW BLD-RTO: 13.7 % (ref 11.6–14.4)
PLATELET # BLD: 116 K/UL (ref 163–337)
PMV BLD AUTO: 10.2 FL (ref 7.4–10.4)
RBC # BLD: 3.97 M/UL (ref 4.63–6.08)
WBC # BLD: 4.36 K/UL (ref 4.23–9.07)

## 2023-01-09 PROCEDURE — 2580000003 HC RX 258: Performed by: INTERNAL MEDICINE

## 2023-01-09 PROCEDURE — 6360000002 HC RX W HCPCS: Performed by: INTERNAL MEDICINE

## 2023-01-09 PROCEDURE — 85025 COMPLETE CBC W/AUTO DIFF WBC: CPT

## 2023-01-09 PROCEDURE — 3075F SYST BP GE 130 - 139MM HG: CPT | Performed by: INTERNAL MEDICINE

## 2023-01-09 PROCEDURE — 99213 OFFICE O/P EST LOW 20 MIN: CPT | Performed by: INTERNAL MEDICINE

## 2023-01-09 PROCEDURE — 1036F TOBACCO NON-USER: CPT | Performed by: INTERNAL MEDICINE

## 2023-01-09 PROCEDURE — 1123F ACP DISCUSS/DSCN MKR DOCD: CPT | Performed by: INTERNAL MEDICINE

## 2023-01-09 PROCEDURE — G8427 DOCREV CUR MEDS BY ELIG CLIN: HCPCS | Performed by: INTERNAL MEDICINE

## 2023-01-09 PROCEDURE — 36415 COLL VENOUS BLD VENIPUNCTURE: CPT

## 2023-01-09 PROCEDURE — 3078F DIAST BP <80 MM HG: CPT | Performed by: INTERNAL MEDICINE

## 2023-01-09 PROCEDURE — 99212 OFFICE O/P EST SF 10 MIN: CPT

## 2023-01-09 PROCEDURE — G8417 CALC BMI ABV UP PARAM F/U: HCPCS | Performed by: INTERNAL MEDICINE

## 2023-01-09 PROCEDURE — 96523 IRRIG DRUG DELIVERY DEVICE: CPT

## 2023-01-09 PROCEDURE — G8484 FLU IMMUNIZE NO ADMIN: HCPCS | Performed by: INTERNAL MEDICINE

## 2023-01-09 RX ORDER — HEPARIN SODIUM (PORCINE) LOCK FLUSH IV SOLN 100 UNIT/ML 100 UNIT/ML
500 SOLUTION INTRAVENOUS PRN
OUTPATIENT
Start: 2023-01-09

## 2023-01-09 RX ORDER — HEPARIN SODIUM (PORCINE) LOCK FLUSH IV SOLN 100 UNIT/ML 100 UNIT/ML
500 SOLUTION INTRAVENOUS PRN
Status: DISCONTINUED | OUTPATIENT
Start: 2023-01-09 | End: 2023-01-10 | Stop reason: HOSPADM

## 2023-01-09 RX ORDER — SODIUM CHLORIDE 0.9 % (FLUSH) 0.9 %
5-40 SYRINGE (ML) INJECTION PRN
Status: DISCONTINUED | OUTPATIENT
Start: 2023-01-09 | End: 2023-01-10 | Stop reason: HOSPADM

## 2023-01-09 RX ORDER — WATER 1000 ML/1000ML
2.2 INJECTION, SOLUTION INTRAVENOUS ONCE
OUTPATIENT
Start: 2023-01-09 | End: 2023-01-09

## 2023-01-09 RX ORDER — SODIUM CHLORIDE 9 MG/ML
25 INJECTION, SOLUTION INTRAVENOUS PRN
OUTPATIENT
Start: 2023-01-09

## 2023-01-09 RX ORDER — SODIUM CHLORIDE 0.9 % (FLUSH) 0.9 %
5-40 SYRINGE (ML) INJECTION PRN
OUTPATIENT
Start: 2023-01-09

## 2023-01-09 RX ADMIN — Medication 500 UNITS: at 10:39

## 2023-01-09 RX ADMIN — Medication 10 ML: at 10:39

## 2023-01-12 RX ORDER — FLUTICASONE PROPIONATE 50 MCG
SPRAY, SUSPENSION (ML) NASAL
Qty: 48 ML | Refills: 1 | Status: SHIPPED | OUTPATIENT
Start: 2023-01-12

## 2023-02-16 RX ORDER — AZELASTINE HYDROCHLORIDE 137 UG/1
SPRAY, METERED NASAL
Qty: 30 ML | Refills: 3 | Status: SHIPPED | OUTPATIENT
Start: 2023-02-16

## 2023-03-03 ENCOUNTER — OFFICE VISIT (OUTPATIENT)
Dept: OTOLARYNGOLOGY | Facility: CLINIC | Age: 83
End: 2023-03-03
Payer: MEDICARE

## 2023-03-03 VITALS
TEMPERATURE: 98 F | WEIGHT: 198 LBS | HEIGHT: 72 IN | SYSTOLIC BLOOD PRESSURE: 137 MMHG | HEART RATE: 58 BPM | DIASTOLIC BLOOD PRESSURE: 62 MMHG | BODY MASS INDEX: 26.82 KG/M2

## 2023-03-03 DIAGNOSIS — C44.310 BASAL CELL CARCINOMA (BCC) OF SKIN OF FACE, UNSPECIFIED PART OF FACE: ICD-10-CM

## 2023-03-03 DIAGNOSIS — R09.81 NASAL CONGESTION: ICD-10-CM

## 2023-03-03 DIAGNOSIS — C44.319 BASAL CELL CARCINOMA (BCC) OF SKIN OF OTHER PART OF FACE: Primary | ICD-10-CM

## 2023-03-03 PROCEDURE — 99213 OFFICE O/P EST LOW 20 MIN: CPT | Performed by: NURSE PRACTITIONER

## 2023-03-03 RX ORDER — CARVEDILOL 6.25 MG/1
6.25 TABLET ORAL 2 TIMES DAILY WITH MEALS
COMMUNITY
Start: 2022-12-29

## 2023-03-03 RX ORDER — AMOXICILLIN 500 MG/1
500 CAPSULE ORAL 3 TIMES DAILY
Qty: 42 CAPSULE | Refills: 0 | Status: SHIPPED | OUTPATIENT
Start: 2023-03-03 | End: 2023-03-17

## 2023-03-03 NOTE — PROGRESS NOTES
YOB: 1940  Location: [facility]  Location Address: [address] [place] [address], [facility], [address] [place] [address]  Location Phone: [phone]    Chief Complaint   Patient presents with   • Skin Lesion   • Ear Problem     Drainage out of right ear   • Sinus Problem     Sinus congestion        History of Present Illness  Juan Carlos Marsh is a 82 y.o. male.  Juan Carlos Marsh is here for follow up of ENT complaints. The patient has had problems with basal cell carcinoma, nasal congestion, sinus pressure, decreased hearing, tinnitus     Patient continues to complain of thick nasal mucous as well as feeling as if his nose is stopped up   He has been using nasal sprays for the past several months with little to no relief. He has used mupirocin in the past but has not used in quite some time   He is not sure if he is taking pepcid      patient [facility] [place] [facility] and [facility] needed hearing aids. He has not gotten these yet     CT Sinus Without Contrast (2022 09:29)      Past Medical History:   Diagnosis Date   • Cancer (HCC)    • Diabetes mellitus (HCC)    • Disease of thyroid gland    • High blood pressure    • High cholesterol    • Sinusitis    • Stroke (HCC)        Past Surgical History:   Procedure Laterality Date   • EXCISION LESION Right 2022    Procedure: Excision of basal cell carcinoma of the right jawline with complex closure;  Surgeon: Ky Godoy MD;  Location: [facility];  Service: ENT;  Laterality: Right;   • HEEL SPUR SURGERY     • PORTACATH PLACEMENT Right    • REPLACEMENT TOTAL KNEE Left    • SHOULDER SURGERY Right    • SKIN LESION EXCISION         Outpatient Medications Marked as Taking for the 3/3/23 encounter (Office Visit) with Ashli Hassan APRN   Medication Sig Dispense Refill   • mupirocin (BACTROBAN) 2 % ointment Apply  topically to the appropriate area as directed 2 (Two) Times a Day for 30 days. 1 g 1       Patient has no  known allergies.    History reviewed. No pertinent family history.    Social History     Socioeconomic History   • Marital status:    Tobacco Use   • Smoking status: Former     Types: Cigarettes     Quit date:      Years since quittin.2   • Smokeless tobacco: Never   Vaping Use   • Vaping Use: Never used   Substance and Sexual Activity   • Alcohol use: Not Currently   • Drug use: Never   • Sexual activity: Defer       Review of Systems   Constitutional: Negative.    HENT: Positive for congestion, postnasal drip and sinus pressure.        Vitals:    23 1027   BP: 137/62   Pulse: 58   Temp: 98 °F (36.7 °C)       Body mass index is 26.85 kg/m².    Objective     Physical Exam  Vitals reviewed.   Constitutional:       Appearance: Normal appearance. He is normal weight.   HENT:      Head: Normocephalic.        Right Ear: Tympanic membrane and external ear normal.      Left Ear: Tympanic membrane and external ear normal.      Ears:      Comments: Right canal with scabbed area noted        Nose:      Comments: Nasal septal deviation   Right significant nasal septal crusting   Left with thick nasal mucous        Mouth/Throat:      Lips: Pink.      Mouth: Mucous membranes are moist.      Pharynx: Uvula midline.   Neurological:      Mental Status: He is alert.         Assessment & Plan   Diagnoses and all orders for this visit:    1. Basal cell carcinoma (BCC) of skin of other part of face (Primary)    2. Basal cell carcinoma (BCC) of skin of face, unspecified part of face    3. Nasal congestion    Other orders  -     mupirocin (BACTROBAN) 2 % ointment; Apply 1 application topically to the appropriate area as directed 3 (Three) Times a Day for 7 days.  Dispense: 22 g; Refill: 0  -     mupirocin (BACTROBAN) 2 % ointment; Apply  topically to the appropriate area as directed 2 (Two) Times a Day for 30 days.  Dispense: 1 g; Refill: 1  -     amoxicillin (AMOXIL) 500 MG capsule; Take 1 capsule by mouth 3  (Three) Times a Day for 14 days.  Dispense: 42 capsule; Refill: 0      * Surgery not found *  No orders of the defined types were placed in this encounter.    Return in about 6 weeks (around 4/14/2023) for Recheck.     Hearing aid amplification discussed  Antibiotics for 2 weeks   Continue nasal sprays   Mupirocin x 1 month   Continue pepcid prior to meals     Patient Instructions   Protect the incisions from sunlight. Sunlight to the incisions will cause permanent pigmentation to the incision line and make the incision more noticeable. After the incision has reepithelialized (typically 2-3 weeks after the procedure), you may begin to use sunscreen with an SPF of 15 or greater For the best response, use your nasal sprays every day without skipping doses. It may take several weeks before the full effect is acheived.

## 2023-03-06 NOTE — PATIENT INSTRUCTIONS
Protect the incisions from sunlight. Sunlight to the incisions will cause permanent pigmentation to the incision line and make the incision more noticeable. After the incision has reepithelialized (typically 2-3 weeks after the procedure), you may begin to use sunscreen with an SPF of 15 or greater For the best response, use your nasal sprays every day without skipping doses. It may take several weeks before the full effect is acheived.

## 2023-04-12 NOTE — PROGRESS NOTES
YOB: 1940  Location: [facility]  Location Address: [address] [place] [address], [facility], [address] [place] [address]  Location Phone: [phone]    Chief Complaint   Patient presents with   • Basal Cell Carcinoma   • Sinus Problem   • Cough       History of Present Illness  Juan Carlos Marsh is a 82 y.o. male.  Juan Carlos Marsh is here for follow up of ENT complaints. The patient has had problems with nasal congestion, rhinorrhea, sinus pressure, decreased hearing, and tinnitus.  He completed a two week course of antibiotics after his last visit. He is still taking Flonase and Astelin nasal sprays daily as well as nasal ointment twice daily.  He is taking pepcid once daily before breakfast.  [facility]    CT Sinus Without Contrast (2022 09:29)      Past Medical History:   Diagnosis Date   • Cancer    • Diabetes mellitus    • Disease of thyroid gland    • Dizziness    • High blood pressure    • High cholesterol    • HL (hearing loss)    • Nosebleed    • Sinusitis    • Stroke        Past Surgical History:   Procedure Laterality Date   • EXCISION LESION Right 2022    Procedure: Excision of basal cell carcinoma of the right jawline with complex closure;  Surgeon: Ky Godoy MD;  Location: [facility];  Service: ENT;  Laterality: Right;   • EYE SURGERY     • HEEL SPUR SURGERY     • PORTACATH PLACEMENT Right    • REPLACEMENT TOTAL KNEE Left    • SHOULDER SURGERY Right    • SKIN LESION EXCISION     • TONSILLECTOMY         Outpatient Medications Marked as Taking for the 23 encounter (Office Visit) with Ky Godoy MD   Medication Sig Dispense Refill   • amiodarone (PACERONE) 200 MG tablet Take 1 tablet by mouth Daily.     • amLODIPine (NORVASC) 10 MG tablet Take 1 tablet by mouth Daily.     • ascorbic acid (VITAMIN C) 500 MG tablet Take 4 tablets by mouth.     • atorvastatin (LIPITOR) 80 MG tablet atorvastatin 80 mg tablet   TAKE 1 TABLET BY MOUTH AT BEDTIME      • carvedilol (COREG) 6.25 MG tablet Take 1 tablet by mouth 2 (Two) Times a Day With Meals.     • cholecalciferol (VITAMIN D3) 25 MCG (1000 UT) tablet Take 2 tablets by mouth Every Night.     • Contour Next Test test strip 1 each by Other route Daily.     • Eliquis 5 MG tablet tablet Take 1 tablet by mouth 2 (Two) Times a Day.     • famotidine (Pepcid) 20 MG tablet Take 1 tablet by mouth 2 (Two) Times a Day. 60 tablet 3   • furosemide (LASIX) 20 MG tablet furosemide 20 mg tablet   TAKE 1 TABLET BY MOUTH ONCE DAILY IN THE MORNING     • hydroCHLOROthiazide (HYDRODIURIL) 12.5 MG tablet hydrochlorothiazide 12.5 mg tablet   TAKE 1 TABLET BY MOUTH ONCE DAILY IN THE MORNING     • levothyroxine (SYNTHROID, LEVOTHROID) 75 MCG tablet take 1 tablet by mouth every day in the morning on empty stomach     • metoprolol tartrate (LOPRESSOR) 50 MG tablet metoprolol tartrate 50 mg tablet   TAKE 1 TABLET BY MOUTH TWICE DAILY     • olmesartan (BENICAR) 40 MG tablet olmesartan 40 mg tablet   TAKE 1 TABLET BY MOUTH ONCE DAILY     • tamsulosin (FLOMAX) 0.4 MG capsule 24 hr capsule Take 1 capsule by mouth Daily.     • terbinafine (lamiSIL) 250 MG tablet terbinafine HCl 250 mg tablet   TAKE 1 TABLET BY MOUTH ONCE DAILY FOR TOENAIL FUNGUS     • [DISCONTINUED] Azelastine HCl 137 MCG/SPRAY solution SPRAY 2 SPRAYS INTO EACH NOSTRIL AS DIRECTED BY PROVIDER 2 TIMES DAILY 30 mL 3   • [DISCONTINUED] fluticasone (FLONASE) 50 MCG/ACT nasal spray SPRAY 2 SPRAYS INTO THE NOSTRILS AS DIRECTED BY PROVIDER DAILY 48 mL 1       Patient has no known allergies.    Family History   Problem Relation Age of Onset   • Stroke Father         Passed at age 88   • Cancer Brother         Twin Brother       Social History     Socioeconomic History   • Marital status:    Tobacco Use   • Smoking status: Former     Packs/day: 0.50     Years: 18.00     Pack years: 9.00     Types: Cigarettes, Pipe, Cigars     Start date: 9/17/1958     Quit date: 2/29/1976      Years since quittin.1   • Smokeless tobacco: Never   • Tobacco comments:     A crazy decision, entered    Vaping Use   • Vaping Use: Never used   Substance and Sexual Activity   • Alcohol use: Not Currently   • Drug use: Never   • Sexual activity: Defer       Review of Systems   Constitutional: Negative.    HENT: Positive for congestion, hearing loss, rhinorrhea, sinus pressure, sinus pain and tinnitus.    Respiratory: Negative.    Cardiovascular: Negative.    Gastrointestinal: Negative.    Genitourinary: Negative.        Vitals:    23 0946   BP: 140/61   Pulse: 52   Resp: 16   Temp: 97.8 °F (36.6 °C)       Body mass index is 25.36 kg/m².    Objective     Physical Exam  Vitals reviewed.   Constitutional:       Appearance: Normal appearance. He is normal weight.   HENT:      Head: Normocephalic.        Right Ear: Tympanic membrane and external ear normal. Decreased hearing noted.      Left Ear: Tympanic membrane and external ear normal. Decreased hearing noted.      Nose:      Comments: Nasal septal deviation        Mouth/Throat:      Lips: Pink.      Mouth: Mucous membranes are moist.      Pharynx: Uvula midline.   Neurological:      Mental Status: He is alert.         Assessment & Plan   Diagnoses and all orders for this visit:    1. Chronic rhinitis (Primary)    2. Nasal congestion    3. Nasal septal deviation    4. Laryngopharyngeal reflux    Other orders  -     ipratropium (ATROVENT) 0.06 % nasal spray; 2 sprays into the nostril(s) as directed by provider 3 (Three) Times a Day.  Dispense: 45 mL; Refill: 3      * Surgery not found *  No orders of the defined types were placed in this encounter.    Use atrovent nasal sprays  Stop astelin and flonase  Consider surgical intervention if symptoms persist  Continue reflux precautions    Dr. Godoy examined and discussed care with patient and agrees with treatment plan.      Return in about 7 weeks (around 2023) for Recheck.       Patient  Instructions   Use atrovent nasal spray  Stop astelin and flonase  Consider surgical intervention if symptoms persist  Continue reflux precautions    For the best response, use your nasal sprays every day without skipping doses. It may take several weeks before the full effect is acheived.      CONTACT INFORMATION:  The main office phone number is [phone]. For emergencies after hours and on weekends, this number will convert over to our answering service and the on call provider will answer. Please try to keep non emergent phone calls/ questions to office hours 9am-5pm Monday through Friday.      [employer]  As an alternative, you can sign up and use the Epic MyChart system for more direct and quicker access for non emergent questions/ problems.  [facility] [employer] allows you to send messages to your doctor, view your test results, renew your prescriptions, schedule appointments, and more. To sign up, go to [employer] and click on the Sign Up Now link in the New User? box. Enter your [employer] Activation Code exactly as it appears below along with the last four digits of your Social Security Number and your Date of Birth () to complete the sign-up process. If you do not sign up before the expiration date, you must request a new code.     [employer] Activation Code: Activation code not generated  Current [employer] Status: Active     If you have questions, you can email [email or web address] or call [phone] to talk to our [employer] staff. Remember, [employer] is NOT to be used for urgent needs. For medical emergencies, dial 911.     IF YOU SMOKE OR USE TOBACCO PLEASE READ THE FOLLOWING:  Why is smoking bad for me?  Smoking increases the risk of heart disease, lung disease, vascular disease, stroke, and cancer. If you smoke, STOP!        IF YOU SMOKE OR USE TOBACCO PLEASE READ THE FOLLOWING:  Why is smoking bad for me?  Smoking increases the risk of heart disease, lung disease, vascular disease,  stroke, and cancer. If you smoke, STOP!     For more information:  [employer] [place]  1-800-QUIT-NOW  https://[place].quitlogix.org/en-US/

## 2023-04-13 ENCOUNTER — OFFICE VISIT (OUTPATIENT)
Dept: OTOLARYNGOLOGY | Facility: CLINIC | Age: 83
End: 2023-04-13
Payer: MEDICARE

## 2023-04-13 VITALS
HEART RATE: 52 BPM | HEIGHT: 72 IN | WEIGHT: 187 LBS | DIASTOLIC BLOOD PRESSURE: 61 MMHG | RESPIRATION RATE: 16 BRPM | TEMPERATURE: 97.8 F | BODY MASS INDEX: 25.33 KG/M2 | SYSTOLIC BLOOD PRESSURE: 140 MMHG

## 2023-04-13 DIAGNOSIS — K21.9 LARYNGOPHARYNGEAL REFLUX: ICD-10-CM

## 2023-04-13 DIAGNOSIS — J31.0 CHRONIC RHINITIS: Primary | ICD-10-CM

## 2023-04-13 DIAGNOSIS — R09.81 NASAL CONGESTION: ICD-10-CM

## 2023-04-13 DIAGNOSIS — J34.2 NASAL SEPTAL DEVIATION: ICD-10-CM

## 2023-04-13 PROCEDURE — 99213 OFFICE O/P EST LOW 20 MIN: CPT | Performed by: NURSE PRACTITIONER

## 2023-04-13 PROCEDURE — 1160F RVW MEDS BY RX/DR IN RCRD: CPT | Performed by: NURSE PRACTITIONER

## 2023-04-13 PROCEDURE — 1159F MED LIST DOCD IN RCRD: CPT | Performed by: NURSE PRACTITIONER

## 2023-04-13 RX ORDER — LEVOTHYROXINE SODIUM 0.07 MG/1
TABLET ORAL
COMMUNITY
Start: 2023-03-14

## 2023-04-13 RX ORDER — IPRATROPIUM BROMIDE 42 UG/1
2 SPRAY, METERED NASAL 3 TIMES DAILY
Qty: 45 ML | Refills: 3 | Status: SHIPPED | OUTPATIENT
Start: 2023-04-13

## 2023-04-13 NOTE — PATIENT INSTRUCTIONS
Use atrovent nasal spray  Stop astelin and flonase  Consider surgical intervention if symptoms persist  Continue reflux precautions    For the best response, use your nasal sprays every day without skipping doses. It may take several weeks before the full effect is acheived.      CONTACT INFORMATION:  The main office phone number is [phone]. For emergencies after hours and on weekends, this number will convert over to our answering service and the on call provider will answer. Please try to keep non emergent phone calls/ questions to office hours 9am-5pm Monday through Friday.      [employer]  As an alternative, you can sign up and use the Epic MyChart system for more direct and quicker access for non emergent questions/ problems.  [employer] allows you to send messages to your doctor, view your test results, renew your prescriptions, schedule appointments, and more. To sign up, go to [employer] and click on the Sign Up Now link in the New User? box. Enter your [employer] Activation Code exactly as it appears below along with the last four digits of your Social Security Number and your Date of Birth () to complete the sign-up process. If you do not sign up before the expiration date, you must request a new code.     [employer] Activation Code: Activation code not generated  Current [employer] Status: Active     If you have questions, you can email [email or web address] or call [phone] to talk to our [employer] staff. Remember, [employer] is NOT to be used for urgent needs. For medical emergencies, dial 911.     IF YOU SMOKE OR USE TOBACCO PLEASE READ THE FOLLOWING:  Why is smoking bad for me?  Smoking increases the risk of heart disease, lung disease, vascular disease, stroke, and cancer. If you smoke, STOP!        IF YOU SMOKE OR USE TOBACCO PLEASE READ THE FOLLOWING:  Why is smoking bad for me?  Smoking increases the risk of heart disease, lung disease, vascular disease, stroke, and  cancer. If you smoke, STOP!     For more information:  [employer] Now [place]  1-800-QUIT-NOW  https://[place].quitlogix.org/en-US/

## 2023-05-19 ENCOUNTER — TELEPHONE (OUTPATIENT)
Dept: HEMATOLOGY | Age: 83
End: 2023-05-19

## 2023-05-19 NOTE — TELEPHONE ENCOUNTER
Left Message. .I have rescheduled CT's for this weds 5/24. He needs to be downstairs [facility]MP at 8:00. His appt has been rescheduled for 6/12 at 10:45.

## 2023-05-22 ENCOUNTER — HOSPITAL ENCOUNTER (OUTPATIENT)
Dept: CT IMAGING | Age: 83
Discharge: HOME | End: 2023-05-22
Payer: MEDICARE

## 2023-05-22 DIAGNOSIS — R93.89 ABNORMAL FINDING ON CT SCAN: Primary | ICD-10-CM

## 2023-05-22 DIAGNOSIS — C83.18 MANTLE CELL LYMPHOMA OF LYMPH NODES OF MULTIPLE SITES (HCC): ICD-10-CM

## 2023-05-22 DIAGNOSIS — C83.18 MANTLE CELL LYMPHOMA OF LYMPH NODES OF MULTIPLE REGIONS (HCC): ICD-10-CM

## 2023-05-22 LAB — CREAT SERPL-MCNC: 1.2 MG/DL (ref 0.3–1.3)

## 2023-05-22 PROCEDURE — 71260 CT THORAX DX C+: CPT

## 2023-05-22 PROCEDURE — 74177 CT ABD & PELVIS W/CONTRAST: CPT | Performed by: RADIOLOGY

## 2023-05-22 PROCEDURE — 71260 CT THORAX DX C+: CPT | Performed by: RADIOLOGY

## 2023-05-22 PROCEDURE — 82565 ASSAY OF CREATININE: CPT

## 2023-05-22 PROCEDURE — 6360000004 HC RX CONTRAST MEDICATION: Performed by: INTERNAL MEDICINE

## 2023-05-22 PROCEDURE — 74177 CT ABD & PELVIS W/CONTRAST: CPT

## 2023-05-22 RX ADMIN — IOPAMIDOL 75 ML: 755 INJECTION, SOLUTION INTRAVENOUS at 09:32

## 2023-05-22 NOTE — PROGRESS NOTES
Patient called and notified of results on recent CT scan. Patient notified of new orders for general surgery consult. Consult placed. Patient notified that someone should be calling him tomorrow with an appt. Patient voiced understanding.  Electronically signed by Carlos Bauman RN on 5/22/2023 at 5:07 PM

## 2023-05-25 ENCOUNTER — OFFICE VISIT (OUTPATIENT)
Dept: SURGERY | Age: 83
End: 2023-05-25
Payer: MEDICARE

## 2023-05-25 VITALS
TEMPERATURE: 97.4 F | WEIGHT: 195.6 LBS | BODY MASS INDEX: 25.92 KG/M2 | OXYGEN SATURATION: 97 % | HEIGHT: 73 IN | HEART RATE: 61 BPM

## 2023-05-25 DIAGNOSIS — K38.8 MASS OF APPENDIX: Primary | ICD-10-CM

## 2023-05-25 PROCEDURE — 99204 OFFICE O/P NEW MOD 45 MIN: CPT | Performed by: SURGERY

## 2023-05-25 PROCEDURE — 1036F TOBACCO NON-USER: CPT | Performed by: SURGERY

## 2023-05-25 PROCEDURE — G8427 DOCREV CUR MEDS BY ELIG CLIN: HCPCS | Performed by: SURGERY

## 2023-05-25 PROCEDURE — 1123F ACP DISCUSS/DSCN MKR DOCD: CPT | Performed by: SURGERY

## 2023-05-25 PROCEDURE — G8417 CALC BMI ABV UP PARAM F/U: HCPCS | Performed by: SURGERY

## 2023-05-25 RX ORDER — AMPICILLIN TRIHYDRATE 250 MG
CAPSULE ORAL
COMMUNITY

## 2023-05-25 ASSESSMENT — ENCOUNTER SYMPTOMS
EYE PAIN: 0
COUGH: 0
NAUSEA: 0
VOMITING: 0
CONSTIPATION: 0
SORE THROAT: 1
ABDOMINAL DISTENTION: 0
CHEST TIGHTNESS: 0
SHORTNESS OF BREATH: 0
RHINORRHEA: 1
DIARRHEA: 0
EYE REDNESS: 0
ABDOMINAL PAIN: 0
BACK PAIN: 1
COLOR CHANGE: 0

## 2023-05-25 NOTE — PROGRESS NOTES
SUBJECTIVE:  Mr. Kavno Maria is a 80 y.o. male who presents today with findings of an appendiceal mass on imaging studies performed to monitor his recent lymphoma that was treated. He notes no abdominal pain. He can not remember his last colonoscopy, but would prefer to not have another one if he can get by with that. Past Medical History:   Diagnosis Date    Agent orange exposure     Atrial fibrillation ([address]     Diabetes mellitus ([address]     Hyperlipidemia     Hypertension     Non Hodgkin's lymphoma ([address]     Non-Hodgkin lymphoma ([address]     Skin cancer     right low cheek    TIA (transient ischemic attack)     TIA (transient ischemic attack)     2019     Past Surgical History:   Procedure Laterality Date    ANKLE SURGERY      CARDIAC CATHETERIZATION      JOINT REPLACEMENT      SHOULDER ARTHROPLASTY       Current Outpatient Medications   Medication Sig Dispense Refill    Multiple Vitamins-Minerals (EYE VITAMINS & MINERALS PO) as directed Orally      Calcium & Magnesium Carbonates 975-232 MG TABS Calcium & Magnesium Carbonates      MULTIPLE VITAMINS-CALCIUM PO 1 tablet Orally Once a day for 30 day(s)      NONFORMULARY PER PATIENT HE TAKES ONE IN MORNING AND ONE IN NIGHT      magnesium oxide (MAG-OX) 400 MG tablet Take 1 tablet by mouth daily      carvedilol (COREG) 6.25 MG tablet TAKE 1 TABLET BY MOUTH TWICE DAILY WITH FOOD      Azelastine HCl 137 MCG/SPRAY SOLN       famotidine (PEPCID) 20 MG tablet Take 1 tablet by mouth 2 times daily      CONTOUR NEXT TEST strip       UNABLE TO FIND Gluconite metabolism & sleep support 1 scoop 1 x a day at night      UNABLE TO FIND Gluco Shield Pro 1 Capsule 1 x a day      Specialty Vitamins Products (INULOSE BLOOD SUGAR SUPPORT) CAPS Take 2 capsules by mouth in the morning and at bedtime      vitamin E 400 UNIT capsule Take 1 capsule by mouth daily      mupirocin (BACTROBAN) 2 % ointment Apply topically 3 times daily Apply topically 3 times daily.       amLODIPine (NORVASC) 10 MG

## 2023-05-30 ENCOUNTER — TELEPHONE (OUTPATIENT)
Dept: SURGERY | Age: 83
End: 2023-05-30

## 2023-06-01 ENCOUNTER — HOSPITAL ENCOUNTER (OUTPATIENT)
Dept: PREADMISSION TESTING | Age: 83
End: 2023-06-01
Payer: MEDICARE

## 2023-06-01 VITALS — HEIGHT: 73 IN | WEIGHT: 190 LBS | BODY MASS INDEX: 25.18 KG/M2

## 2023-06-01 LAB
ANION GAP SERPL CALCULATED.3IONS-SCNC: 9 MMOL/L (ref 7–19)
BASOPHILS # BLD: 0.1 K/UL (ref 0–0.2)
BASOPHILS NFR BLD: 1 % (ref 0–1)
BUN SERPL-MCNC: 23 MG/DL (ref 8–23)
CALCIUM SERPL-MCNC: 9.6 MG/DL (ref 8.8–10.2)
CHLORIDE SERPL-SCNC: 105 MMOL/L (ref 98–111)
CO2 SERPL-SCNC: 27 MMOL/L (ref 22–29)
CREAT SERPL-MCNC: 1 MG/DL (ref 0.5–1.2)
EKG P AXIS: NORMAL DEGREES
EKG P-R INTERVAL: NORMAL MS
EKG Q-T INTERVAL: 456 MS
EKG QRS DURATION: 108 MS
EKG QTC CALCULATION (BAZETT): 444 MS
EKG T AXIS: 21 DEGREES
EOSINOPHIL # BLD: 0.2 K/UL (ref 0–0.6)
EOSINOPHIL NFR BLD: 4.4 % (ref 0–5)
ERYTHROCYTE [DISTWIDTH] IN BLOOD BY AUTOMATED COUNT: 13.9 % (ref 11.5–14.5)
GLUCOSE SERPL-MCNC: 138 MG/DL (ref 74–109)
HCT VFR BLD AUTO: 39.5 % (ref 42–52)
HGB BLD-MCNC: 13.1 G/DL (ref 14–18)
IMM GRANULOCYTES # BLD: 0 K/UL
LYMPHOCYTES # BLD: 1.2 K/UL (ref 1.1–4.5)
LYMPHOCYTES NFR BLD: 24.7 % (ref 20–40)
MCH RBC QN AUTO: 31.6 PG (ref 27–31)
MCHC RBC AUTO-ENTMCNC: 33.2 G/DL (ref 33–37)
MCV RBC AUTO: 95.2 FL (ref 80–94)
MONOCYTES # BLD: 0.5 K/UL (ref 0–0.9)
MONOCYTES NFR BLD: 9.8 % (ref 0–10)
NEUTROPHILS # BLD: 2.9 K/UL (ref 1.5–7.5)
NEUTS SEG NFR BLD: 59.7 % (ref 50–65)
PLATELET # BLD AUTO: 138 K/UL (ref 130–400)
PMV BLD AUTO: 10 FL (ref 9.4–12.4)
POTASSIUM SERPL-SCNC: 4 MMOL/L (ref 3.5–5)
RBC # BLD AUTO: 4.15 M/UL (ref 4.7–6.1)
SODIUM SERPL-SCNC: 141 MMOL/L (ref 136–145)
WBC # BLD AUTO: 4.8 K/UL (ref 4.8–10.8)

## 2023-06-01 PROCEDURE — 85025 COMPLETE CBC W/AUTO DIFF WBC: CPT

## 2023-06-01 PROCEDURE — 93005 ELECTROCARDIOGRAM TRACING: CPT | Performed by: ANESTHESIOLOGY

## 2023-06-01 PROCEDURE — 93010 ELECTROCARDIOGRAM REPORT: CPT | Performed by: INTERNAL MEDICINE

## 2023-06-01 PROCEDURE — 80048 BASIC METABOLIC PNL TOTAL CA: CPT

## 2023-06-01 RX ORDER — ATORVASTATIN CALCIUM 80 MG/1
80 TABLET, FILM COATED ORAL NIGHTLY
COMMUNITY

## 2023-06-01 RX ORDER — AMPICILLIN TRIHYDRATE 250 MG
4 CAPSULE ORAL 2 TIMES DAILY
COMMUNITY

## 2023-06-01 RX ORDER — LORATADINE 10 MG/1
10 TABLET ORAL DAILY
COMMUNITY

## 2023-06-01 RX ORDER — GAUZE BANDAGE 2" X 2"
1 BANDAGE TOPICAL DAILY
COMMUNITY

## 2023-06-01 RX ORDER — LEVOTHYROXINE SODIUM 88 UG/1
88 TABLET ORAL DAILY
COMMUNITY

## 2023-06-01 NOTE — DISCHARGE INSTRUCTIONS
The day before surgery you will receive a phone call from the surgery nurse to let you know what time to arrive on the day of surgery. This call will usually be between 2-4 PM. If you do not receive a phone call by 4 PM the day before your surgery please call [phone] and let them know you have not received an arrival time. If your surgery is on Monday, your call will be on the Friday before your Monday surgery. Chlorhexidine Gluconate 4% Solution    Patient should shower with this soap the night before surgery and the morning of surgery washing from the neck down (avoiding contact with genitalia). DO NOT [address] YOUR HAIR OR FACE WITH THIS SOAP. When washing with this soap, apply enough to suds up the body thoroughly, turn the water away from your body and allow the soap suds to remain on the body for 2 full minutes, then rinse body completely. After using this soap on the body, please do not apply powders or lotions to your body. After the shower the night before surgery, please dry off with a new towel, sleep in new freshly laundered pj's, and change your bed linen before going to sleep. MEDICATION INSTRUCTIONS PRIOR TO YOUR SURGERY    Night before surgery:      ________Do not take Metformin (you will not be eating or drinking after midnight)      ________Take half dose of your evening insulin      The morning of surgery:    DO NOT TAKE YOUR OLMESARTAN THE MORNING OF SURGERY. You can take all your usual prescribed medications with a small sip of water. DO NOT TAKE ANY DIABETIC MEDICATIONS the morning of your surgery. DO NOT TAKE ANY SUPPLEMENTS or over the counter medications the morning of  surgery. PREOPERATIVE GUIDELINES WHEN RECEIVING ANESTHESIA    Do not eat or drink anything after midnight, the night before your surgery. No gum or candy the morning of surgery. This is extremely important for your safety.     Take a bath (or shower) the night before your surgery and you

## 2023-06-02 ENCOUNTER — ANESTHESIA (OUTPATIENT)
Dept: OPERATING ROOM | Age: 83
End: 2023-06-02
Payer: MEDICARE

## 2023-06-02 ENCOUNTER — HOSPITAL ENCOUNTER (OUTPATIENT)
Age: 83
Setting detail: OUTPATIENT SURGERY
Discharge: HOME | End: 2023-06-02
Attending: SURGERY | Admitting: SURGERY
Payer: MEDICARE

## 2023-06-02 ENCOUNTER — ANESTHESIA EVENT (OUTPATIENT)
Dept: OPERATING ROOM | Age: 83
End: 2023-06-02
Payer: MEDICARE

## 2023-06-02 VITALS
WEIGHT: 190 LBS | OXYGEN SATURATION: 97 % | TEMPERATURE: 97.8 F | DIASTOLIC BLOOD PRESSURE: 67 MMHG | HEIGHT: 72 IN | RESPIRATION RATE: 16 BRPM | SYSTOLIC BLOOD PRESSURE: 168 MMHG | HEART RATE: 58 BPM | BODY MASS INDEX: 25.73 KG/M2

## 2023-06-02 DIAGNOSIS — K38.8 MASS OF APPENDIX: ICD-10-CM

## 2023-06-02 LAB
GLUCOSE BLD-MCNC: 139 MG/DL (ref 70–99)
PERFORMED ON: ABNORMAL

## 2023-06-02 PROCEDURE — 2709999900 HC NON-CHARGEABLE SUPPLY: Performed by: SURGERY

## 2023-06-02 PROCEDURE — 3700000001 HC ADD 15 MINUTES (ANESTHESIA): Performed by: SURGERY

## 2023-06-02 PROCEDURE — 7100000010 HC PHASE II RECOVERY - FIRST 15 MIN: Performed by: SURGERY

## 2023-06-02 PROCEDURE — 3600000004 HC SURGERY LEVEL 4 BASE: Performed by: SURGERY

## 2023-06-02 PROCEDURE — 3700000000 HC ANESTHESIA ATTENDED CARE: Performed by: SURGERY

## 2023-06-02 PROCEDURE — 3600000014 HC SURGERY LEVEL 4 ADDTL 15MIN: Performed by: SURGERY

## 2023-06-02 PROCEDURE — 2500000003 HC RX 250 WO HCPCS: Performed by: SURGERY

## 2023-06-02 PROCEDURE — 44970 LAPAROSCOPY APPENDECTOMY: CPT | Performed by: SURGERY

## 2023-06-02 PROCEDURE — 6360000002 HC RX W HCPCS: Performed by: NURSE ANESTHETIST, CERTIFIED REGISTERED

## 2023-06-02 PROCEDURE — 2720000010 HC SURG SUPPLY STERILE: Performed by: SURGERY

## 2023-06-02 PROCEDURE — 88304 TISSUE EXAM BY PATHOLOGIST: CPT

## 2023-06-02 PROCEDURE — 82962 GLUCOSE BLOOD TEST: CPT

## 2023-06-02 PROCEDURE — 2580000003 HC RX 258: Performed by: NURSE ANESTHETIST, CERTIFIED REGISTERED

## 2023-06-02 PROCEDURE — 6360000002 HC RX W HCPCS: Performed by: SURGERY

## 2023-06-02 PROCEDURE — 6360000002 HC RX W HCPCS: Performed by: ANESTHESIOLOGY

## 2023-06-02 PROCEDURE — 2580000003 HC RX 258: Performed by: ANESTHESIOLOGY

## 2023-06-02 PROCEDURE — 7100000001 HC PACU RECOVERY - ADDTL 15 MIN: Performed by: SURGERY

## 2023-06-02 PROCEDURE — 2580000003 HC RX 258: Performed by: SURGERY

## 2023-06-02 PROCEDURE — 2500000003 HC RX 250 WO HCPCS: Performed by: NURSE ANESTHETIST, CERTIFIED REGISTERED

## 2023-06-02 PROCEDURE — 7100000011 HC PHASE II RECOVERY - ADDTL 15 MIN: Performed by: SURGERY

## 2023-06-02 PROCEDURE — 7100000000 HC PACU RECOVERY - FIRST 15 MIN: Performed by: SURGERY

## 2023-06-02 RX ORDER — FENTANYL CITRATE 50 UG/ML
INJECTION, SOLUTION INTRAMUSCULAR; INTRAVENOUS PRN
Status: DISCONTINUED | OUTPATIENT
Start: 2023-06-02 | End: 2023-06-02 | Stop reason: SDUPTHER

## 2023-06-02 RX ORDER — SODIUM CHLORIDE 9 MG/ML
INJECTION, SOLUTION INTRAVENOUS PRN
Status: DISCONTINUED | OUTPATIENT
Start: 2023-06-02 | End: 2023-06-02 | Stop reason: HOSPADM

## 2023-06-02 RX ORDER — HYDROMORPHONE HYDROCHLORIDE 1 MG/ML
0.5 INJECTION, SOLUTION INTRAMUSCULAR; INTRAVENOUS; SUBCUTANEOUS EVERY 5 MIN PRN
Status: DISCONTINUED | OUTPATIENT
Start: 2023-06-02 | End: 2023-06-02 | Stop reason: HOSPADM

## 2023-06-02 RX ORDER — PROPOFOL 10 MG/ML
INJECTION, EMULSION INTRAVENOUS PRN
Status: DISCONTINUED | OUTPATIENT
Start: 2023-06-02 | End: 2023-06-02 | Stop reason: SDUPTHER

## 2023-06-02 RX ORDER — OXYCODONE HYDROCHLORIDE AND ACETAMINOPHEN 5; 325 MG/1; MG/1
1 TABLET ORAL EVERY 6 HOURS PRN
Qty: 12 TABLET | Refills: 0 | Status: SHIPPED | OUTPATIENT
Start: 2023-06-02 | End: 2023-06-05

## 2023-06-02 RX ORDER — SODIUM CHLORIDE, SODIUM LACTATE, POTASSIUM CHLORIDE, CALCIUM CHLORIDE 600; 310; 30; 20 MG/100ML; MG/100ML; MG/100ML; MG/100ML
INJECTION, SOLUTION INTRAVENOUS CONTINUOUS
Status: DISCONTINUED | OUTPATIENT
Start: 2023-06-02 | End: 2023-06-02 | Stop reason: HOSPADM

## 2023-06-02 RX ORDER — SODIUM CHLORIDE, SODIUM LACTATE, POTASSIUM CHLORIDE, CALCIUM CHLORIDE 600; 310; 30; 20 MG/100ML; MG/100ML; MG/100ML; MG/100ML
INJECTION, SOLUTION INTRAVENOUS CONTINUOUS PRN
Status: DISCONTINUED | OUTPATIENT
Start: 2023-06-02 | End: 2023-06-02 | Stop reason: SDUPTHER

## 2023-06-02 RX ORDER — SODIUM CHLORIDE 0.9 % (FLUSH) 0.9 %
5-40 SYRINGE (ML) INJECTION PRN
Status: DISCONTINUED | OUTPATIENT
Start: 2023-06-02 | End: 2023-06-02 | Stop reason: HOSPADM

## 2023-06-02 RX ORDER — HYDROMORPHONE HYDROCHLORIDE 1 MG/ML
0.25 INJECTION, SOLUTION INTRAMUSCULAR; INTRAVENOUS; SUBCUTANEOUS EVERY 5 MIN PRN
Status: DISCONTINUED | OUTPATIENT
Start: 2023-06-02 | End: 2023-06-02 | Stop reason: HOSPADM

## 2023-06-02 RX ORDER — ONDANSETRON 2 MG/ML
4 INJECTION INTRAMUSCULAR; INTRAVENOUS
Status: DISCONTINUED | OUTPATIENT
Start: 2023-06-02 | End: 2023-06-02 | Stop reason: HOSPADM

## 2023-06-02 RX ORDER — SODIUM CHLORIDE 0.9 % (FLUSH) 0.9 %
5-40 SYRINGE (ML) INJECTION EVERY 12 HOURS SCHEDULED
Status: DISCONTINUED | OUTPATIENT
Start: 2023-06-02 | End: 2023-06-02 | Stop reason: HOSPADM

## 2023-06-02 RX ORDER — ONDANSETRON 2 MG/ML
INJECTION INTRAMUSCULAR; INTRAVENOUS PRN
Status: DISCONTINUED | OUTPATIENT
Start: 2023-06-02 | End: 2023-06-02 | Stop reason: SDUPTHER

## 2023-06-02 RX ORDER — LIDOCAINE HYDROCHLORIDE AND EPINEPHRINE 10; 10 MG/ML; UG/ML
INJECTION, SOLUTION INFILTRATION; PERINEURAL PRN
Status: DISCONTINUED | OUTPATIENT
Start: 2023-06-02 | End: 2023-06-02 | Stop reason: ALTCHOICE

## 2023-06-02 RX ORDER — ROCURONIUM BROMIDE 10 MG/ML
INJECTION, SOLUTION INTRAVENOUS PRN
Status: DISCONTINUED | OUTPATIENT
Start: 2023-06-02 | End: 2023-06-02 | Stop reason: SDUPTHER

## 2023-06-02 RX ORDER — KETOROLAC TROMETHAMINE 30 MG/ML
INJECTION, SOLUTION INTRAMUSCULAR; INTRAVENOUS PRN
Status: DISCONTINUED | OUTPATIENT
Start: 2023-06-02 | End: 2023-06-02 | Stop reason: SDUPTHER

## 2023-06-02 RX ORDER — BUPIVACAINE HYDROCHLORIDE 2.5 MG/ML
INJECTION, SOLUTION INFILTRATION; PERINEURAL PRN
Status: DISCONTINUED | OUTPATIENT
Start: 2023-06-02 | End: 2023-06-02 | Stop reason: ALTCHOICE

## 2023-06-02 RX ORDER — LIDOCAINE HYDROCHLORIDE 10 MG/ML
INJECTION, SOLUTION EPIDURAL; INFILTRATION; INTRACAUDAL; PERINEURAL PRN
Status: DISCONTINUED | OUTPATIENT
Start: 2023-06-02 | End: 2023-06-02 | Stop reason: SDUPTHER

## 2023-06-02 RX ADMIN — SODIUM CHLORIDE, SODIUM LACTATE, POTASSIUM CHLORIDE, AND CALCIUM CHLORIDE: 600; 310; 30; 20 INJECTION, SOLUTION INTRAVENOUS at 08:14

## 2023-06-02 RX ADMIN — PROPOFOL 150 MG: 10 INJECTION, EMULSION INTRAVENOUS at 08:22

## 2023-06-02 RX ADMIN — LIDOCAINE HYDROCHLORIDE 50 MG: 10 INJECTION, SOLUTION EPIDURAL; INFILTRATION; INTRACAUDAL; PERINEURAL at 08:22

## 2023-06-02 RX ADMIN — FENTANYL CITRATE 50 MCG: 0.05 INJECTION, SOLUTION INTRAMUSCULAR; INTRAVENOUS at 08:22

## 2023-06-02 RX ADMIN — SUGAMMADEX 250 MG: 100 INJECTION, SOLUTION INTRAVENOUS at 09:05

## 2023-06-02 RX ADMIN — KETOROLAC TROMETHAMINE 30 MG: 30 INJECTION, SOLUTION INTRAMUSCULAR; INTRAVENOUS at 08:22

## 2023-06-02 RX ADMIN — SODIUM CHLORIDE, SODIUM LACTATE, POTASSIUM CHLORIDE, AND CALCIUM CHLORIDE: 600; 310; 30; 20 INJECTION, SOLUTION INTRAVENOUS at 08:57

## 2023-06-02 RX ADMIN — CEFAZOLIN 2000 MG: 2 INJECTION, POWDER, FOR SOLUTION INTRAMUSCULAR; INTRAVENOUS at 08:15

## 2023-06-02 RX ADMIN — ROCURONIUM BROMIDE 50 MG: 10 INJECTION, SOLUTION INTRAVENOUS at 08:22

## 2023-06-02 RX ADMIN — SODIUM CHLORIDE, POTASSIUM CHLORIDE, SODIUM LACTATE AND CALCIUM CHLORIDE: 600; 310; 30; 20 INJECTION, SOLUTION INTRAVENOUS at 07:35

## 2023-06-02 RX ADMIN — ONDANSETRON 4 MG: 2 INJECTION INTRAMUSCULAR; INTRAVENOUS at 08:22

## 2023-06-02 RX ADMIN — FENTANYL CITRATE 50 MCG: 0.05 INJECTION, SOLUTION INTRAMUSCULAR; INTRAVENOUS at 08:40

## 2023-06-02 RX ADMIN — HYDROMORPHONE HYDROCHLORIDE 0.25 MG: 1 INJECTION, SOLUTION INTRAMUSCULAR; INTRAVENOUS; SUBCUTANEOUS at 09:29

## 2023-06-02 ASSESSMENT — PAIN DESCRIPTION - LOCATION: LOCATION: ABDOMEN

## 2023-06-02 ASSESSMENT — PAIN SCALES - GENERAL
PAINLEVEL_OUTOF10: 1
PAINLEVEL_OUTOF10: 4

## 2023-06-02 ASSESSMENT — PAIN DESCRIPTION - DESCRIPTORS: DESCRIPTORS: CRAMPING

## 2023-06-02 ASSESSMENT — LIFESTYLE VARIABLES: SMOKING_STATUS: 0

## 2023-06-02 NOTE — ANESTHESIA POSTPROCEDURE EVALUATION
Department of Anesthesiology  Postprocedure Note    Patient: Dayday Carranza  MRN: [id number]  YOB: 1940  Date of evaluation: 6/2/2023      Procedure Summary     Date: 06/02/23 Room / Location: [address]    Anesthesia Start: 5401 Anesthesia Stop: 7811    Procedure: APPENDECTOMY LAPAROSCOPIC (Abdomen) Diagnosis:       Mass of appendix      (Mass of appendix [K00.1])    Surgeons: Daniel Reeder DO Responsible Provider: LULA Nguyen CRNA    Anesthesia Type: general ASA Status: 3          Anesthesia Type: No value filed.     Brittni Phase I: Brittni Score: 9    Brittni Phase II:        Anesthesia Post Evaluation    Patient location during evaluation: bedside  Patient participation: complete - patient participated  Level of consciousness: awake and alert  Pain score: 0  Airway patency: patent  Nausea & Vomiting: no nausea  Complications: no  Cardiovascular status: hemodynamically stable  Respiratory status: acceptable  Hydration status: euvolemic  Comments: BP (!) 169/51   Pulse 56   Temp 97.8 °F (36.6 °C) (Temporal)   Resp 18   Ht 6' (1.829 m)   Wt 190 lb (86.2 kg)   SpO2 96%   BMI 25.77 kg/m²

## 2023-06-02 NOTE — ANESTHESIA PRE PROCEDURE
Department of Anesthesiology  Preprocedure Note       Name:  Brinda De La Vega   Age:  80 y.o.  :  1940                                          MRN:  [id number]         Date:  2023      Surgeon: Linnette Verma):  Mee Tovar DO    Procedure: Procedure(s):  APPENDECTOMY LAPAROSCOPIC ROBOTIC    Medications prior to admission:   Prior to Admission medications    Medication Sig Start Date End Date Taking?  Authorizing Provider   Cholecalciferol (VITAMIN D3) 25 MCG (1000 UT) CAPS Take 1 capsule by mouth daily    Historical Provider, MD   Cinnamon 500 MG CAPS Take 4 capsules by mouth 2 times daily    Historical Provider, MD   B Complex-C (SUPER B COMPLEX/VITAMIN C) TABS Take 1 tablet by mouth daily    Historical Provider, MD   atorvastatin (LIPITOR) 80 MG tablet Take 1 tablet by mouth nightly    Historical Provider, MD   levothyroxine (SYNTHROID) 88 MCG tablet Take 1 tablet by mouth Daily    Historical Provider, MD   NONFORMULARY Take 1 capsule by mouth daily CINNACHROMA-helps maintain healthy blood sugar    Historical Provider, MD   loratadine (CLARITIN) 10 MG tablet Take 1 tablet by mouth daily    Historical Provider, MD   Omega-3 300 MG CAPS Take 1 capsule by mouth 2 times daily    Historical Provider, MD   NONFORMULARY Take 1 capsule by mouth daily GLUCOBERRY-dietary supplement to support blood sugar health    Historical Provider, MD   NONFORMULARY Place 3 drops under the tongue every morning (before breakfast) AMICLEAR- supports healthy blood sugar    Historical Provider, MD   magnesium oxide (MAG-OX) 400 MG tablet Take 1 tablet by mouth daily    Historical Provider, MD   Azelastine HCl 137 MCG/SPRAY SOLN 3 sprays by Each Nostril route daily as needed 22   Historical Provider, MD   famotidine (PEPCID) 20 MG tablet Take 1 tablet by mouth 2 times daily 22   Historical Provider, MD   UNABLE TO FIND Gluconite metabolism & sleep support 1 scoop 1 x a day at night    Historical Provider, MD   UNABLE TO FIND

## 2023-06-02 NOTE — OP NOTE
Operative Note      Patient: Malik Ortiz  YOB: 1940  MRN: [id number]    Date of Procedure: 6/2/2023    Pre-Op Diagnosis Codes:     * Mass of appendix [K38.8]    Post-Op Diagnosis: Same       Procedure(s):  APPENDECTOMY LAPAROSCOPIC    Surgeon(s):  Shirley Bell DO    Assistant:   * No surgical staff found *    Anesthesia: General    Estimated Blood Loss (mL): Minimal    Complications: None    Specimens:   ID Type Source Tests Collected by Time Destination   A : appendix Tissue Appendix SURGICAL PATHOLOGY Shirley Bell DO 4/2/2021 0849        Implants:  * No implants in log *      Drains:   [REMOVED] Urinary Catheter 06/02/23 Grimaldo (Removed)       Findings: Thickened distal appendix. Detailed Description of Procedure:     Mr. Billie Aschoff was taken to the main operating room and placed in the supine position. After general anesthesia was administered, the abdomen was prepped and draped in the usual, sterile fashion. A grimaldo catheter was placed. A timeout was performed identifying the correct patient, procedure, preoperative antibiotics, and necessary available equipment. A small incision was made in the superior aspect of the umbilicus after instilling local anesthetic. Via Mirtha technique the abdomen was entered and insufflated via 12mm Trocar. A 5mm laparoscope was inserted and inspection undertaken. No injury from insertion was appreciated. In the right lower quadrant no purulence was seen and the appendix was not initially visible. Two, 5mm trocars were then inserted under direct visualization after local anesthetic was injected, in the right upper abdomen and in the left lower abdomen. The patient was then rotated to the left and placed in reverse Trendelenburg position and the appendix was visualized. It appeared thickened. Blunt and electrocautery dissection was used to free the appendix from lateral attachments.   The mesoappendix was freed from the appendix and divided with the

## 2023-06-02 NOTE — INTERVAL H&P NOTE
Update History & Physical    The patient's History and Physical of May 25, 2023 was reviewed with the patient and I examined the patient. There was no change. The surgical site was confirmed by the patient and me. Plan: The risks, benefits, expected outcome, and alternative to the recommended procedure have been discussed with the patient. Patient understands and wants to proceed with the procedure.      Electronically signed by Tigre Cohn DO on 2/4/8304 at 8:05 AM

## 2023-06-02 NOTE — PROGRESS NOTES
CLINICAL PHARMACY NOTE: MEDS TO BEDS    Total # of Prescriptions Filled: 1   The following medications were delivered to the patient:  Current Discharge Medication List        START taking these medications    Details   oxyCODONE-acetaminophen (PERCOCET) 5-325 MG per tablet Take 1 tablet by mouth every 6 hours as needed for Pain for up to 3 days. Intended supply: 3 days. Take lowest dose possible to manage pain Max Daily Amount: 4 tablets  Qty: 12 tablet, Refills: 0    Comments: Reduce doses taken as pain becomes manageable  Associated Diagnoses: Mass of appendix               Additional Documentation:    Delivered RX's to patient bedside. Also dispensed free Narcan. Paid with cash.

## 2023-06-06 NOTE — PROGRESS NOTES
YOB: 1940  Location: [facility]  Location Address: [address] [place] [address], [facility], [address] [place] [address]  Location Phone: [phone]    Chief Complaint   Patient presents with    Basal Cell Carcinoma    Sinus Problem    Ear Problem     Right ear soreness close to where cancer spot was removed       History of Present Illness  Juan Carlos Marsh is a 82 y.o. male.  Juan Carlos Marsh is here for follow up of ENT complaints. The patient has had problems with nasal congestion, postnasal drip, rhinorrhea and sinus pressure  Patient is taking astelin and atrovent and uses flonase intermittently. Patient states he has been using a [employer] nasal spray as well that works well to clear things up he is taking reflux medications as directed    Patient recently had a mass removed from his appendix as well as hernia repair     CT Sinus Without Contrast (2022 09:29)        Past Medical History:   Diagnosis Date    Cancer     Diabetes mellitus     Disease of thyroid gland     Dizziness     High blood pressure     High cholesterol     HL (hearing loss)     Nosebleed     Sinusitis     Stroke        Past Surgical History:   Procedure Laterality Date    APPENDECTOMY      EXCISION LESION Right 2022    Procedure: Excision of basal cell carcinoma of the right jawline with complex closure;  Surgeon: Ky Godoy MD;  Location: [facility] OR;  Service: ENT;  Laterality: Right;    EYE SURGERY      HEEL SPUR SURGERY      HERNIA REPAIR      PORTACATH PLACEMENT Right     REPLACEMENT TOTAL KNEE Left     SHOULDER SURGERY Right     SKIN LESION EXCISION      TONSILLECTOMY         Outpatient Medications Marked as Taking for the 23 encounter (Office Visit) with Ky Godoy MD   Medication Sig Dispense Refill    amiodarone (PACERONE) 200 MG tablet Take 1 tablet by mouth Daily.      amLODIPine (NORVASC) 10 MG tablet Take 1 tablet by mouth Daily.      ascorbic acid (VITAMIN C) 500 MG tablet Take 4 tablets by mouth.       atorvastatin (LIPITOR) 80 MG tablet atorvastatin 80 mg tablet   TAKE 1 TABLET BY MOUTH AT BEDTIME      azelastine (ASTELIN) 0.1 % nasal spray 2 sprays into the nostril(s) as directed by provider 2 (Two) Times a Day. Use in each nostril as directed      carvedilol (COREG) 6.25 MG tablet Take 1 tablet by mouth 2 (Two) Times a Day With Meals.      cholecalciferol (VITAMIN D3) 25 MCG (1000 UT) tablet Take 2 tablets by mouth Every Night.      Contour Next Test test strip 1 each by Other route Daily.      Eliquis 5 MG tablet tablet Take 1 tablet by mouth 2 (Two) Times a Day.      famotidine (Pepcid) 20 MG tablet Take 1 tablet by mouth 2 (Two) Times a Day. 60 tablet 3    fluticasone (FLONASE) 50 MCG/ACT nasal spray 2 sprays into the nostril(s) as directed by provider Daily.      furosemide (LASIX) 20 MG tablet furosemide 20 mg tablet   TAKE 1 TABLET BY MOUTH ONCE DAILY IN THE MORNING      hydroCHLOROthiazide (HYDRODIURIL) 12.5 MG tablet hydrochlorothiazide 12.5 mg tablet   TAKE 1 TABLET BY MOUTH ONCE DAILY IN THE MORNING      ipratropium (ATROVENT) 0.06 % nasal spray 2 sprays into the nostril(s) as directed by provider 3 (Three) Times a Day. 45 mL 3    levothyroxine (SYNTHROID, LEVOTHROID) 75 MCG tablet take 1 tablet by mouth every day in the morning on empty stomach      metoprolol tartrate (LOPRESSOR) 50 MG tablet metoprolol tartrate 50 mg tablet   TAKE 1 TABLET BY MOUTH TWICE DAILY      olmesartan (BENICAR) 40 MG tablet olmesartan 40 mg tablet   TAKE 1 TABLET BY MOUTH ONCE DAILY      tamsulosin (FLOMAX) 0.4 MG capsule 24 hr capsule Take 1 capsule by mouth Daily.      terbinafine (lamiSIL) 250 MG tablet terbinafine HCl 250 mg tablet   TAKE 1 TABLET BY MOUTH ONCE DAILY FOR TOENAIL FUNGUS         Patient has no known allergies.    Family History   Problem Relation Age of Onset    Stroke Father         Passed at age 88    Cancer Brother         Twin Brother       Social History     Socioeconomic History    Marital status:     Tobacco Use    Smoking status: Former     Packs/day: 0.50     Years: 18.00     Pack years: 9.00     Types: Cigarettes, Pipe, Cigars     Start date: 1958     Quit date: 1976     Years since quittin.3    Smokeless tobacco: Never    Tobacco comments:     A crazy decision, entered    Vaping Use    Vaping Use: Never used   Substance and Sexual Activity    Alcohol use: Not Currently    Drug use: Never    Sexual activity: Defer       Review of Systems   Constitutional: Negative.    HENT:  Positive for postnasal drip and sinus pressure.      Vitals:    23 0924   BP: 132/65   Pulse: 63   Resp: 16   Temp: 97.5 °F (36.4 °C)       Body mass index is 25.77 kg/m².    Objective     Physical Exam  Vitals reviewed.   Constitutional:       Appearance: Normal appearance. He is normal weight.   HENT:      Head: Normocephalic.      Right Ear: Tympanic membrane, ear canal and external ear normal. Decreased hearing noted.      Left Ear: Tympanic membrane, ear canal and external ear normal. Decreased hearing noted.      Ears:        Nose: Nose normal. Congestion and rhinorrhea present.      Mouth/Throat:      Lips: Pink.      Mouth: Mucous membranes are moist.      Pharynx: Uvula midline.   Musculoskeletal:      Cervical back: Full passive range of motion without pain.   Neurological:      Mental Status: He is alert.       Assessment & Plan   Diagnoses and all orders for this visit:    1. Chronic rhinitis (Primary)    2. Nasal congestion    Other orders  -     mupirocin (BACTROBAN) 2 % ointment; Apply 1 application topically to the appropriate area as directed 2 (Two) Times a Day for 14 days.  Dispense: 28 g; Refill: 0      * Surgery not found *  No orders of the defined types were placed in this encounter.    Return in about 6 weeks (around 2023) for Recheck.     Stop otc nasal spray  Continue flonase, astelin and atrovent   Will re evaluate symptoms in several weeks due to patient recently  undergoing surgery   And discuss surgical vs conservative options     Patient Instructions   For the best response, use your nasal sprays every day without skipping doses. It may take several weeks before the full effect is acheived.

## 2023-06-08 ENCOUNTER — OFFICE VISIT (OUTPATIENT)
Dept: OTOLARYNGOLOGY | Facility: CLINIC | Age: 83
End: 2023-06-08
Payer: MEDICARE

## 2023-06-08 VITALS
SYSTOLIC BLOOD PRESSURE: 132 MMHG | WEIGHT: 190 LBS | HEIGHT: 72 IN | TEMPERATURE: 97.5 F | DIASTOLIC BLOOD PRESSURE: 65 MMHG | RESPIRATION RATE: 16 BRPM | BODY MASS INDEX: 25.73 KG/M2 | HEART RATE: 63 BPM

## 2023-06-08 DIAGNOSIS — R09.81 NASAL CONGESTION: ICD-10-CM

## 2023-06-08 DIAGNOSIS — J31.0 CHRONIC RHINITIS: Primary | ICD-10-CM

## 2023-06-08 RX ORDER — FLUTICASONE PROPIONATE 50 MCG
2 SPRAY, SUSPENSION (ML) NASAL DAILY
COMMUNITY

## 2023-06-08 RX ORDER — AZELASTINE 1 MG/ML
2 SPRAY, METERED NASAL 2 TIMES DAILY
COMMUNITY

## 2023-06-12 ENCOUNTER — HOSPITAL ENCOUNTER (OUTPATIENT)
Dept: INFUSION THERAPY | Age: 83
Discharge: HOME | End: 2023-06-12
Payer: MEDICARE

## 2023-06-12 DIAGNOSIS — C83.18 MANTLE CELL LYMPHOMA OF LYMPH NODES OF MULTIPLE REGIONS (HCC): ICD-10-CM

## 2023-06-12 DIAGNOSIS — C85.90 NON-HODGKIN'S LYMPHOMA, UNSPECIFIED BODY REGION, UNSPECIFIED NON-HODGKIN LYMPHOMA TYPE (HCC): Primary | ICD-10-CM

## 2023-06-12 LAB
ERYTHROCYTE [DISTWIDTH] IN BLOOD BY AUTOMATED COUNT: 13.3 % (ref 11.6–14.4)
HCT VFR BLD AUTO: 37.4 % (ref 40.1–51)
HGB BLD-MCNC: 12.2 G/DL (ref 13.7–17.5)
LYMPHOCYTES # BLD: 1.2 K/UL (ref 1.18–3.74)
LYMPHOCYTES NFR BLD: 22.9 % (ref 19.3–53.1)
MCH RBC QN AUTO: 31.4 PG (ref 25.7–32.2)
MCHC RBC AUTO-ENTMCNC: 32.6 G/DL (ref 32.3–36.5)
MCV RBC AUTO: 96.1 FL (ref 79–92.2)
MONOCYTES # BLD: 0.5 K/UL (ref 0.24–0.82)
MONOCYTES NFR BLD: 9.4 % (ref 4.7–12.5)
NEUTROPHILS # BLD: 3.7 K/UL (ref 1.56–6.13)
NEUTS SEG NFR BLD: 67.7 % (ref 34–71.1)
PLATELET # BLD AUTO: 115 K/UL (ref 163–337)
PMV BLD AUTO: 9.8 FL (ref 7.4–10.4)
RBC # BLD AUTO: 3.89 M/UL (ref 4.63–6.08)
WBC # BLD AUTO: 5.4 K/UL (ref 4.23–9.07)

## 2023-06-12 PROCEDURE — 99212 OFFICE O/P EST SF 10 MIN: CPT

## 2023-06-12 PROCEDURE — 96523 IRRIG DRUG DELIVERY DEVICE: CPT

## 2023-06-12 PROCEDURE — 85025 COMPLETE CBC W/AUTO DIFF WBC: CPT

## 2023-06-12 PROCEDURE — 6360000002 HC RX W HCPCS: Performed by: INTERNAL MEDICINE

## 2023-06-12 PROCEDURE — 2580000003 HC RX 258: Performed by: INTERNAL MEDICINE

## 2023-06-12 PROCEDURE — 36415 COLL VENOUS BLD VENIPUNCTURE: CPT

## 2023-06-12 RX ORDER — SODIUM CHLORIDE 0.9 % (FLUSH) 0.9 %
5-40 SYRINGE (ML) INJECTION PRN
Status: DISCONTINUED | OUTPATIENT
Start: 2023-06-12 | End: 2023-06-13 | Stop reason: HOSPADM

## 2023-06-12 RX ORDER — HEPARIN SODIUM 100 [USP'U]/ML
500 INJECTION, SOLUTION INTRAVENOUS PRN
Status: DISCONTINUED | OUTPATIENT
Start: 2023-06-12 | End: 2023-06-13 | Stop reason: HOSPADM

## 2023-06-12 RX ADMIN — SODIUM CHLORIDE, PRESERVATIVE FREE 10 ML: 5 INJECTION INTRAVENOUS at 11:18

## 2023-06-12 RX ADMIN — HEPARIN 500 UNITS: 100 SYRINGE at 11:18

## 2023-06-19 ENCOUNTER — OFFICE VISIT (OUTPATIENT)
Dept: CARDIOLOGY CLINIC | Age: 83
End: 2023-06-19
Payer: MEDICARE

## 2023-06-19 VITALS
HEIGHT: 73 IN | DIASTOLIC BLOOD PRESSURE: 78 MMHG | BODY MASS INDEX: 25.84 KG/M2 | WEIGHT: 195 LBS | HEART RATE: 61 BPM | SYSTOLIC BLOOD PRESSURE: 124 MMHG

## 2023-06-19 DIAGNOSIS — I48.0 PAROXYSMAL ATRIAL FIBRILLATION (HCC): Primary | ICD-10-CM

## 2023-06-19 PROCEDURE — 93000 ELECTROCARDIOGRAM COMPLETE: CPT | Performed by: INTERNAL MEDICINE

## 2023-06-19 PROCEDURE — 3078F DIAST BP <80 MM HG: CPT | Performed by: INTERNAL MEDICINE

## 2023-06-19 PROCEDURE — 1123F ACP DISCUSS/DSCN MKR DOCD: CPT | Performed by: INTERNAL MEDICINE

## 2023-06-19 PROCEDURE — G8427 DOCREV CUR MEDS BY ELIG CLIN: HCPCS | Performed by: INTERNAL MEDICINE

## 2023-06-19 PROCEDURE — G8417 CALC BMI ABV UP PARAM F/U: HCPCS | Performed by: INTERNAL MEDICINE

## 2023-06-19 PROCEDURE — 1036F TOBACCO NON-USER: CPT | Performed by: INTERNAL MEDICINE

## 2023-06-19 PROCEDURE — 99214 OFFICE O/P EST MOD 30 MIN: CPT | Performed by: INTERNAL MEDICINE

## 2023-06-19 PROCEDURE — 3074F SYST BP LT 130 MM HG: CPT | Performed by: INTERNAL MEDICINE

## 2023-06-19 ASSESSMENT — ENCOUNTER SYMPTOMS
COUGH: 0
APNEA: 0
DIARRHEA: 0
ABDOMINAL PAIN: 0
ABDOMINAL DISTENTION: 0
CHEST TIGHTNESS: 0
SHORTNESS OF BREATH: 0
EYE REDNESS: 0
EYE DISCHARGE: 0
VOMITING: 0
BACK PAIN: 1
CONSTIPATION: 0
FACIAL SWELLING: 0
WHEEZING: 0
NAUSEA: 0
SORE THROAT: 0
BLOOD IN STOOL: 0
EYE PAIN: 0

## 2023-06-19 NOTE — PROGRESS NOTES
flank pain, frequency and hematuria. Musculoskeletal:  Positive for back pain and gait problem. Negative for joint swelling, myalgias and neck pain. Skin:  Negative for pallor and rash. Neurological:  Negative for dizziness, syncope, speech difficulty, light-headedness, numbness and headaches. Psychiatric/Behavioral:  Negative for confusion, hallucinations and sleep disturbance.       Past Medical History:      Diagnosis Date    Agent orange exposure     Atrial fibrillation ([address]     Cerebral artery occlusion with cerebral infarction ([facility]     Diabetes mellitus ([address]     Hyperlipidemia     Hypertension     Non-Hodgkin lymphoma ([facility] [facility]     Skin cancer     right low cheek    TIA (transient ischemic attack) 2019       Past Surgical History:      Procedure Laterality Date    ANKLE SURGERY Right     CARDIAC CATHETERIZATION      JOINT REPLACEMENT Left     knee    LAPAROSCOPIC APPENDECTOMY N/A 6/2/2023    APPENDECTOMY LAPAROSCOPIC performed by Vicky Matta DO at [facility] Right 06/2017    SHOULDER ARTHROPLASTY Right     x 2       Medications:  Current Outpatient Medications   Medication Sig Dispense Refill    Cholecalciferol (VITAMIN D3) 25 MCG (1000 UT) CAPS Take 1 capsule by mouth daily      Cinnamon 500 MG CAPS Take 4 capsules by mouth 2 times daily      B Complex-C (SUPER B COMPLEX/VITAMIN C) TABS Take 1 tablet by mouth daily      atorvastatin (LIPITOR) 80 MG tablet Take 1 tablet by mouth nightly      levothyroxine (SYNTHROID) 88 MCG tablet Take 1 tablet by mouth Daily      NONFORMULARY Take 1 capsule by mouth daily CINNACHROMA-helps maintain healthy blood sugar      loratadine (CLARITIN) 10 MG tablet Take 1 tablet by mouth daily      Omega-3 300 MG CAPS Take 1 capsule by mouth 2 times daily      NONFORMULARY Take 1 capsule by mouth daily GLUCOBERRY-dietary supplement to support blood sugar health      NONFORMULARY Place 3 drops under the tongue every morning (before breakfast) AMICLEAR- supports

## 2023-07-06 ENCOUNTER — HOSPITAL ENCOUNTER (EMERGENCY)
Age: 83
Discharge: HOME | End: 2023-07-06
Payer: MEDICARE

## 2023-07-06 ENCOUNTER — APPOINTMENT (OUTPATIENT)
Dept: CT IMAGING | Age: 83
End: 2023-07-06
Payer: MEDICARE

## 2023-07-06 VITALS
SYSTOLIC BLOOD PRESSURE: 148 MMHG | HEART RATE: 61 BPM | OXYGEN SATURATION: 98 % | BODY MASS INDEX: 26.08 KG/M2 | WEIGHT: 195 LBS | RESPIRATION RATE: 15 BRPM | TEMPERATURE: 98.2 F | DIASTOLIC BLOOD PRESSURE: 72 MMHG

## 2023-07-06 DIAGNOSIS — R52 PAIN, UNSPECIFIED: Primary | ICD-10-CM

## 2023-07-06 LAB
ALBUMIN SERPL-MCNC: 4.5 G/DL (ref 3.5–5.2)
ALP SERPL-CCNC: 158 U/L (ref 40–130)
ALT SERPL-CCNC: 31 U/L (ref 5–41)
ANION GAP SERPL CALCULATED.3IONS-SCNC: 9 MMOL/L (ref 7–19)
AST SERPL-CCNC: 23 U/L (ref 5–40)
BASOPHILS # BLD: 0 K/UL (ref 0–0.2)
BASOPHILS NFR BLD: 0.5 % (ref 0–1)
BILIRUB SERPL-MCNC: 0.5 MG/DL (ref 0.2–1.2)
BUN SERPL-MCNC: 20 MG/DL (ref 8–23)
CALCIUM SERPL-MCNC: 10.3 MG/DL (ref 8.8–10.2)
CHLORIDE SERPL-SCNC: 101 MMOL/L (ref 98–111)
CO2 SERPL-SCNC: 29 MMOL/L (ref 22–29)
CREAT SERPL-MCNC: 1.1 MG/DL (ref 0.5–1.2)
EOSINOPHIL # BLD: 0.2 K/UL (ref 0–0.6)
EOSINOPHIL NFR BLD: 3.8 % (ref 0–5)
ERYTHROCYTE [DISTWIDTH] IN BLOOD BY AUTOMATED COUNT: 13.6 % (ref 11.5–14.5)
GLUCOSE BLD-MCNC: 167 MG/DL (ref 70–99)
GLUCOSE SERPL-MCNC: 166 MG/DL (ref 74–109)
HCT VFR BLD AUTO: 37.6 % (ref 42–52)
HGB BLD-MCNC: 12.5 G/DL (ref 14–18)
IMM GRANULOCYTES # BLD: 0 K/UL
LYMPHOCYTES # BLD: 1.2 K/UL (ref 1.1–4.5)
LYMPHOCYTES NFR BLD: 28 % (ref 20–40)
MCH RBC QN AUTO: 31.6 PG (ref 27–31)
MCHC RBC AUTO-ENTMCNC: 33.2 G/DL (ref 33–37)
MCV RBC AUTO: 94.9 FL (ref 80–94)
MONOCYTES # BLD: 0.4 K/UL (ref 0–0.9)
MONOCYTES NFR BLD: 9.4 % (ref 0–10)
NEUTROPHILS # BLD: 2.5 K/UL (ref 1.5–7.5)
NEUTS SEG NFR BLD: 58.1 % (ref 50–65)
PERFORMED ON: ABNORMAL
PLATELET # BLD AUTO: 142 K/UL (ref 130–400)
PMV BLD AUTO: 9.9 FL (ref 9.4–12.4)
POTASSIUM SERPL-SCNC: 4 MMOL/L (ref 3.5–5)
PROT SERPL-MCNC: 7.1 G/DL (ref 6.6–8.7)
RBC # BLD AUTO: 3.96 M/UL (ref 4.7–6.1)
SODIUM SERPL-SCNC: 139 MMOL/L (ref 136–145)
TROPONIN T SERPL-MCNC: <0.01 NG/ML (ref 0–0.03)
WBC # BLD AUTO: 4.3 K/UL (ref 4.8–10.8)

## 2023-07-06 PROCEDURE — 93005 ELECTROCARDIOGRAM TRACING: CPT | Performed by: PHYSICIAN ASSISTANT

## 2023-07-06 PROCEDURE — 85025 COMPLETE CBC W/AUTO DIFF WBC: CPT

## 2023-07-06 PROCEDURE — 80053 COMPREHEN METABOLIC PANEL: CPT

## 2023-07-06 PROCEDURE — 84484 ASSAY OF TROPONIN QUANT: CPT

## 2023-07-06 PROCEDURE — 99284 EMERGENCY DEPT VISIT MOD MDM: CPT

## 2023-07-06 PROCEDURE — 70450 CT HEAD/BRAIN W/O DYE: CPT

## 2023-07-06 PROCEDURE — 36415 COLL VENOUS BLD VENIPUNCTURE: CPT

## 2023-07-06 PROCEDURE — 82962 GLUCOSE BLOOD TEST: CPT

## 2023-07-06 ASSESSMENT — ENCOUNTER SYMPTOMS
SHORTNESS OF BREATH: 0
NAUSEA: 0
VOMITING: 0
ABDOMINAL PAIN: 0
DIARRHEA: 0

## 2023-07-07 ENCOUNTER — APPOINTMENT (OUTPATIENT)
Dept: CT IMAGING | Age: 83
End: 2023-07-07
Payer: MEDICARE

## 2023-07-07 ENCOUNTER — HOSPITAL ENCOUNTER (EMERGENCY)
Age: 83
Discharge: HOME | End: 2023-07-07
Attending: PEDIATRICS
Payer: MEDICARE

## 2023-07-07 ENCOUNTER — APPOINTMENT (OUTPATIENT)
Dept: GENERAL RADIOLOGY | Age: 83
End: 2023-07-07
Payer: MEDICARE

## 2023-07-07 VITALS
TEMPERATURE: 97.1 F | HEART RATE: 50 BPM | SYSTOLIC BLOOD PRESSURE: 134 MMHG | OXYGEN SATURATION: 97 % | RESPIRATION RATE: 16 BRPM | DIASTOLIC BLOOD PRESSURE: 61 MMHG

## 2023-07-07 DIAGNOSIS — M54.12 CERVICAL RADICULOPATHY: Primary | ICD-10-CM

## 2023-07-07 DIAGNOSIS — M50.30 DEGENERATIVE DISC DISEASE, CERVICAL: ICD-10-CM

## 2023-07-07 LAB
ALBUMIN SERPL-MCNC: 4.3 G/DL (ref 3.5–5.2)
ALP SERPL-CCNC: 156 U/L (ref 40–130)
ALT SERPL-CCNC: 25 U/L (ref 5–41)
ANION GAP SERPL CALCULATED.3IONS-SCNC: 11 MMOL/L (ref 7–19)
AST SERPL-CCNC: 20 U/L (ref 5–40)
BASOPHILS # BLD: 0 K/UL (ref 0–0.2)
BASOPHILS NFR BLD: 0.4 % (ref 0–1)
BILIRUB SERPL-MCNC: 0.4 MG/DL (ref 0.2–1.2)
BILIRUB UR QL STRIP: NEGATIVE
BUN SERPL-MCNC: 20 MG/DL (ref 8–23)
CALCIUM SERPL-MCNC: 10 MG/DL (ref 8.8–10.2)
CHLORIDE SERPL-SCNC: 101 MMOL/L (ref 98–111)
CLARITY UR: CLEAR
CO2 SERPL-SCNC: 25 MMOL/L (ref 22–29)
COLOR UR: YELLOW
CREAT SERPL-MCNC: 0.8 MG/DL (ref 0.5–1.2)
EKG P AXIS: NORMAL DEGREES
EKG P-R INTERVAL: NORMAL MS
EKG Q-T INTERVAL: 470 MS
EKG QRS DURATION: 110 MS
EKG QTC CALCULATION (BAZETT): 455 MS
EKG T AXIS: 23 DEGREES
EOSINOPHIL # BLD: 0.1 K/UL (ref 0–0.6)
EOSINOPHIL NFR BLD: 2 % (ref 0–5)
ERYTHROCYTE [DISTWIDTH] IN BLOOD BY AUTOMATED COUNT: 13.7 % (ref 11.5–14.5)
GLUCOSE SERPL-MCNC: 160 MG/DL (ref 74–109)
GLUCOSE UR STRIP.AUTO-MCNC: NEGATIVE MG/DL
HCT VFR BLD AUTO: 37.6 % (ref 42–52)
HGB BLD-MCNC: 12.4 G/DL (ref 14–18)
HGB UR STRIP.AUTO-MCNC: NEGATIVE MG/L
IMM GRANULOCYTES # BLD: 0 K/UL
KETONES UR STRIP.AUTO-MCNC: NEGATIVE MG/DL
LEUKOCYTE ESTERASE UR QL STRIP.AUTO: NEGATIVE
LIPASE SERPL-CCNC: 19 U/L (ref 13–60)
LYMPHOCYTES # BLD: 1.2 K/UL (ref 1.1–4.5)
LYMPHOCYTES NFR BLD: 23.5 % (ref 20–40)
MCH RBC QN AUTO: 31.7 PG (ref 27–31)
MCHC RBC AUTO-ENTMCNC: 33 G/DL (ref 33–37)
MCV RBC AUTO: 96.2 FL (ref 80–94)
MONOCYTES # BLD: 0.5 K/UL (ref 0–0.9)
MONOCYTES NFR BLD: 9.6 % (ref 0–10)
NEUTROPHILS # BLD: 3.3 K/UL (ref 1.5–7.5)
NEUTS SEG NFR BLD: 64.1 % (ref 50–65)
NITRITE UR QL STRIP.AUTO: NEGATIVE
PH UR STRIP.AUTO: 6 [PH] (ref 5–8)
PLATELET # BLD AUTO: 149 K/UL (ref 130–400)
PMV BLD AUTO: 9.7 FL (ref 9.4–12.4)
POTASSIUM SERPL-SCNC: 3.9 MMOL/L (ref 3.5–5)
PROT SERPL-MCNC: 6.6 G/DL (ref 6.6–8.7)
PROT UR STRIP.AUTO-MCNC: NEGATIVE MG/DL
RBC # BLD AUTO: 3.91 M/UL (ref 4.7–6.1)
SODIUM SERPL-SCNC: 137 MMOL/L (ref 136–145)
SP GR UR STRIP.AUTO: 1.01 (ref 1–1.03)
TROPONIN T SERPL-MCNC: <0.01 NG/ML (ref 0–0.03)
UROBILINOGEN UR STRIP.AUTO-MCNC: 0.2 E.U./DL
WBC # BLD AUTO: 5.1 K/UL (ref 4.8–10.8)

## 2023-07-07 PROCEDURE — 96374 THER/PROPH/DIAG INJ IV PUSH: CPT

## 2023-07-07 PROCEDURE — 84484 ASSAY OF TROPONIN QUANT: CPT

## 2023-07-07 PROCEDURE — 72125 CT NECK SPINE W/O DYE: CPT

## 2023-07-07 PROCEDURE — 93005 ELECTROCARDIOGRAM TRACING: CPT | Performed by: PEDIATRICS

## 2023-07-07 PROCEDURE — 72128 CT CHEST SPINE W/O DYE: CPT

## 2023-07-07 PROCEDURE — 81003 URINALYSIS AUTO W/O SCOPE: CPT

## 2023-07-07 PROCEDURE — 85025 COMPLETE CBC W/AUTO DIFF WBC: CPT

## 2023-07-07 PROCEDURE — 36415 COLL VENOUS BLD VENIPUNCTURE: CPT

## 2023-07-07 PROCEDURE — 6360000002 HC RX W HCPCS: Performed by: PEDIATRICS

## 2023-07-07 PROCEDURE — 71045 X-RAY EXAM CHEST 1 VIEW: CPT

## 2023-07-07 PROCEDURE — 80053 COMPREHEN METABOLIC PANEL: CPT

## 2023-07-07 PROCEDURE — 93010 ELECTROCARDIOGRAM REPORT: CPT | Performed by: INTERNAL MEDICINE

## 2023-07-07 PROCEDURE — 99285 EMERGENCY DEPT VISIT HI MDM: CPT

## 2023-07-07 PROCEDURE — 83690 ASSAY OF LIPASE: CPT

## 2023-07-07 RX ORDER — METHYLPREDNISOLONE 4 MG/1
TABLET ORAL
Qty: 1 KIT | Refills: 0 | Status: SHIPPED | OUTPATIENT
Start: 2023-07-07

## 2023-07-07 RX ORDER — DEXAMETHASONE SODIUM PHOSPHATE 10 MG/ML
4 INJECTION, SOLUTION INTRAMUSCULAR; INTRAVENOUS ONCE
Status: COMPLETED | OUTPATIENT
Start: 2023-07-07 | End: 2023-07-07

## 2023-07-07 RX ADMIN — DEXAMETHASONE SODIUM PHOSPHATE 4 MG: 10 INJECTION, SOLUTION INTRAMUSCULAR; INTRAVENOUS at 17:51

## 2023-07-07 NOTE — ED PROVIDER NOTES
[facility] EMERGENCY DEPT  eMERGENCY dEPARTMENT eNCOUnter      Pt Name: Curt Castillo  MRN: [id number]  [facility] 1940  Date of evaluation: 7/6/2023  Provider: Kristie Clark, [facility]       Chief Complaint   Patient presents with    Pain     Pain on the front of his body that started two hours pta, says this is how he felt with his previous stroke         HISTORY OF PRESENT ILLNESS   (Location/Symptom, Timing/Onset,Context/Setting, Quality, Duration, Modifying Factors, Severity)  Note limiting factors. Curt Castillo is a 80 y.o. male with history including atrial fibrillation, diabetes, hypertension, hyperlipidemia, non-Hodgkin's lymphoma, BPH, hypothyroidism, CVA, and chronic anticoagulation with Eliquis who presents to the emergency department with concern for stroke. The patient states that he had about 5 seconds of pain that radiated down the entire front of his body. This did resolve. He had a second episode about 30 minutes later. These occurred around 9 and 9:30 AM.  Due to these complaints, the patient came to the ER. He states he had a similar feeling the last time he had a stroke. He denies any trauma. He denies any chest pain or shortness of breath. He states he feels back to his baseline at this time. He denies that he felt any headache, dizziness, weakness, or numbness. Family denies that he has had any change in speech or facial asymmetry. NursingNotes were reviewed. REVIEW OF SYSTEMS    (2-9 systems for level 4, 10 or more for level 5)     Review of Systems   Constitutional:  Negative for chills and fever. Respiratory:  Negative for shortness of breath. Cardiovascular:  Negative for chest pain and leg swelling. Gastrointestinal:  Negative for abdominal pain, diarrhea, nausea and vomiting. Musculoskeletal:  Negative for myalgias. Neurological:  Negative for dizziness, weakness, numbness and headaches. All other systems reviewed and are negative.          PAST

## 2023-07-07 NOTE — DISCHARGE INSTRUCTIONS
Return or seek medical attention with weakness, difficulty walking, loss of control of bowel or bladder, or other concerns.

## 2023-07-10 LAB
EKG P AXIS: 15 DEGREES
EKG P-R INTERVAL: 224 MS
EKG Q-T INTERVAL: 454 MS
EKG QRS DURATION: 106 MS
EKG QTC CALCULATION (BAZETT): 444 MS
EKG T AXIS: 18 DEGREES

## 2023-07-10 PROCEDURE — 93010 ELECTROCARDIOGRAM REPORT: CPT | Performed by: INTERNAL MEDICINE

## 2023-07-16 ASSESSMENT — ENCOUNTER SYMPTOMS
RHINORRHEA: 0
COUGH: 0
NAUSEA: 0
SHORTNESS OF BREATH: 0
EYE DISCHARGE: 0
BACK PAIN: 1
VOMITING: 0
COLOR CHANGE: 0
ABDOMINAL PAIN: 1

## 2023-08-11 ENCOUNTER — APPOINTMENT (OUTPATIENT)
Dept: GENERAL RADIOLOGY | Age: 83
End: 2023-08-11
Payer: MEDICARE

## 2023-08-11 ENCOUNTER — APPOINTMENT (OUTPATIENT)
Dept: CT IMAGING | Age: 83
End: 2023-08-11
Payer: MEDICARE

## 2023-08-11 ENCOUNTER — HOSPITAL ENCOUNTER (EMERGENCY)
Age: 83
Discharge: HOME | End: 2023-08-11
Payer: MEDICARE

## 2023-08-11 VITALS
HEIGHT: 73 IN | HEART RATE: 66 BPM | WEIGHT: 195 LBS | RESPIRATION RATE: 16 BRPM | DIASTOLIC BLOOD PRESSURE: 64 MMHG | OXYGEN SATURATION: 98 % | BODY MASS INDEX: 25.84 KG/M2 | TEMPERATURE: 98.2 F | SYSTOLIC BLOOD PRESSURE: 142 MMHG

## 2023-08-11 DIAGNOSIS — S09.90XA CLOSED HEAD INJURY, INITIAL ENCOUNTER: Primary | ICD-10-CM

## 2023-08-11 DIAGNOSIS — S01.01XA LACERATION OF SCALP, INITIAL ENCOUNTER: ICD-10-CM

## 2023-08-11 DIAGNOSIS — S63.92XA SPRAIN AND STRAIN OF LEFT HAND: ICD-10-CM

## 2023-08-11 DIAGNOSIS — S66.912A SPRAIN AND STRAIN OF LEFT HAND: ICD-10-CM

## 2023-08-11 PROCEDURE — 90471 IMMUNIZATION ADMIN: CPT | Performed by: PHYSICIAN ASSISTANT

## 2023-08-11 PROCEDURE — 73130 X-RAY EXAM OF HAND: CPT

## 2023-08-11 PROCEDURE — 72125 CT NECK SPINE W/O DYE: CPT

## 2023-08-11 PROCEDURE — 99284 EMERGENCY DEPT VISIT MOD MDM: CPT

## 2023-08-11 PROCEDURE — 70450 CT HEAD/BRAIN W/O DYE: CPT

## 2023-08-11 PROCEDURE — 90715 TDAP VACCINE 7 YRS/> IM: CPT | Performed by: PHYSICIAN ASSISTANT

## 2023-08-11 PROCEDURE — 6360000002 HC RX W HCPCS: Performed by: PHYSICIAN ASSISTANT

## 2023-08-11 RX ADMIN — TETANUS TOXOID, REDUCED DIPHTHERIA TOXOID AND ACELLULAR PERTUSSIS VACCINE, ADSORBED 0.5 ML: 5; 2.5; 8; 8; 2.5 SUSPENSION INTRAMUSCULAR at 18:52

## 2023-08-11 ASSESSMENT — ENCOUNTER SYMPTOMS
VOMITING: 0
NAUSEA: 0
BACK PAIN: 0
ABDOMINAL PAIN: 0
SHORTNESS OF BREATH: 0
DIARRHEA: 0

## 2023-08-11 ASSESSMENT — PAIN - FUNCTIONAL ASSESSMENT: PAIN_FUNCTIONAL_ASSESSMENT: NONE - DENIES PAIN

## 2023-08-11 NOTE — ED PROVIDER NOTES
[facility] EMERGENCY DEPT  eMERGENCY dEPARTMENT eNCOUnter      Pt Name: Yrn Vizcarra  MRN: [id number]  [address] [facility] 1940  Date of evaluation: 8/11/2023  Provider: Joshua Gilbert       Chief Complaint   Patient presents with    Head Injury     Norrine Hails in briar patch and hit back of head, c/o head and neck pain. Pt on eliquis         HISTORY OF PRESENT ILLNESS   (Location/Symptom, Timing/Onset,Context/Setting, Quality, Duration, Modifying Factors, Severity)  Note limiting factors. Yrn Vizcarra is a 80 y.o. male with history of diabetes, atrial fibrillation, agent orange exposure, HTN, non-hodgkin lymphoma, hyperlipidemia, CVA, and chronic anticoagulation with eliquis who presents to the emergency department with complaint of a fall. The patient states that he was out mowing whenever he tripped and fell backwards. He did not get dizzy, have a syncopal episode, or lose consciousness. He states he had bleeding from a laceration on the head. He denies any dizziness but does have some \"soreness\" in the back of the scalp. The patient notes some neck soreness but denies numbness or weakness of the extremities. He denies any blurred vision, nausea, or vomiting. He denies any chest pain, back pain, abdominal pain, or extremity pain. He does have a small abrasion on the left hand and does have concern for a possible thorn in the hand. NursingNotes were reviewed. REVIEW OF SYSTEMS    (2-9 systems for level 4, 10 or more for level 5)     Review of Systems   Constitutional:  Negative for chills and fever. Respiratory:  Negative for shortness of breath. Cardiovascular:  Negative for chest pain. Gastrointestinal:  Negative for abdominal pain, diarrhea, nausea and vomiting. Musculoskeletal:  Positive for neck pain. Negative for arthralgias and back pain. Skin:  Positive for wound. Neurological:  Positive for headaches. Negative for dizziness, weakness and numbness.    All other systems reviewed

## 2023-08-21 ENCOUNTER — HOSPITAL ENCOUNTER (OUTPATIENT)
Dept: CT IMAGING | Age: 83
Discharge: HOME | End: 2023-08-21
Payer: MEDICARE

## 2023-08-21 DIAGNOSIS — C83.18 MANTLE CELL LYMPHOMA OF LYMPH NODES OF MULTIPLE REGIONS (HCC): ICD-10-CM

## 2023-08-21 PROCEDURE — 74177 CT ABD & PELVIS W/CONTRAST: CPT

## 2023-08-21 PROCEDURE — 6360000004 HC RX CONTRAST MEDICATION: Performed by: INTERNAL MEDICINE

## 2023-08-21 PROCEDURE — 71260 CT THORAX DX C+: CPT

## 2023-08-21 RX ADMIN — IOPAMIDOL 75 ML: 755 INJECTION, SOLUTION INTRAVENOUS at 09:57

## 2023-09-07 NOTE — PROGRESS NOTES
MEDICAL ONCOLOGY PROGRESS NOTE    Pt Name: Izzy Sandoval  MRN: [id number]  YOB: 1940  Date of evaluation: 9/26/2023      HISTORY OF PRESENT ILLNESS:  The patient has a diagnosis of mantle cell lymphoma in 2017. He received 6 cycles of bendamustine/rituximab followed by 2 years of rituximab maintenance completed December 2019. He has been in remission. Denies any B symptoms. Denies any peripheral adenopathy. He had repeat CT chest abdomen pelvis to assess disease status. He denies any new complaints. Diagnosis  B-cell lymphoma, consistent with Mantle cell lymphoma, June 2017  CD20+  Translocation 11, 14  Cyclin D1 positive  SOX-11 positive    Treatment Summary  8/2/17 - 12/19/17 Completion Bendamustine/Rituxan x6 cycles  2/16/18 - 12/18/19 Completion Maintenance Rituxan every 2 months    Hematology/Cancer History  Kaveh Lew was first seen by me on 12/8/2021. He presents to [facility] of care of a history of mantle cell lymphoma. He was treated in [place], [place]. He received induction chemotherapy with bendamustine/rituximab followed by 2 years of maintenance rituximab.  12/16/16 US Abdominal ([facility], [place]):  20 cm x 9.7 cm splenomegaly  5/19/17 CT chest, abdomen, pelvis ([facility], [place]): Splenomegaly is similar to the previous exam. No evidence of adenopathy involving the chest abdomen or pelvis. Small nonspecific granular nodule the right upper lobe. Follow-up imaging to assess stability recommended. 6/26/17 Bone marrow ([facility], [place], [place]): Mildly hypercellular, 40%. CD20+ B-cell lymphoma consistent with mantle cell lymphoma. B-cell lymphoma involves 45%-50% of marrow cellularity. Trilineage hematopoiesis with adequate megakaryocytes. Iron increased (3+/4+). FLOW: CD20+, CD5+, monoclonal B-cell population. Monoclonal B-cell population is CD5+, CD19+, Bright  CD20+, Lambda+, CD23 dim and involves 17% of events.  No increased

## 2023-09-08 ENCOUNTER — TELEPHONE (OUTPATIENT)
Dept: HEMATOLOGY | Age: 83
End: 2023-09-08

## 2023-09-25 ENCOUNTER — TELEPHONE (OUTPATIENT)
Dept: HEMATOLOGY | Age: 83
End: 2023-09-25

## 2023-09-25 NOTE — TELEPHONE ENCOUNTER
Called patient and reminded pt of appt on 09/26/23. Patient voiced their understanding.      Electronically signed by Phyllis Elliott MA on 9/25/2023 at 3:40 PM

## 2023-09-26 ENCOUNTER — OFFICE VISIT (OUTPATIENT)
Dept: HEMATOLOGY | Age: 83
End: 2023-09-26
Payer: MEDICARE

## 2023-09-26 ENCOUNTER — HOSPITAL ENCOUNTER (OUTPATIENT)
Dept: INFUSION THERAPY | Age: 83
Discharge: HOME | End: 2023-09-26
Payer: MEDICARE

## 2023-09-26 VITALS
TEMPERATURE: 97.9 F | OXYGEN SATURATION: 97 % | HEART RATE: 79 BPM | SYSTOLIC BLOOD PRESSURE: 148 MMHG | BODY MASS INDEX: 25.06 KG/M2 | WEIGHT: 189.1 LBS | HEIGHT: 73 IN | DIASTOLIC BLOOD PRESSURE: 52 MMHG

## 2023-09-26 DIAGNOSIS — R91.1 LUNG NODULE SEEN ON IMAGING STUDY: ICD-10-CM

## 2023-09-26 DIAGNOSIS — K86.9 PANCREATIC LESION: ICD-10-CM

## 2023-09-26 DIAGNOSIS — Z08 ENCOUNTER FOR FOLLOW-UP SURVEILLANCE OF LYMPHOMA: ICD-10-CM

## 2023-09-26 DIAGNOSIS — Z85.72 ENCOUNTER FOR FOLLOW-UP SURVEILLANCE OF LYMPHOMA: ICD-10-CM

## 2023-09-26 DIAGNOSIS — Z71.89 CARE PLAN DISCUSSED WITH PATIENT: Primary | ICD-10-CM

## 2023-09-26 DIAGNOSIS — D64.9 NORMOCYTIC ANEMIA: ICD-10-CM

## 2023-09-26 DIAGNOSIS — C83.18 MANTLE CELL LYMPHOMA OF LYMPH NODES OF MULTIPLE REGIONS (HCC): ICD-10-CM

## 2023-09-26 DIAGNOSIS — C85.90 NON-HODGKIN'S LYMPHOMA, UNSPECIFIED BODY REGION, UNSPECIFIED NON-HODGKIN LYMPHOMA TYPE (HCC): Primary | ICD-10-CM

## 2023-09-26 DIAGNOSIS — D69.6 THROMBOCYTOPENIA (HCC): ICD-10-CM

## 2023-09-26 LAB
BASOPHILS # BLD: 0.03 K/UL (ref 0.01–0.08)
BASOPHILS NFR BLD: 0.6 % (ref 0.1–1.2)
EOSINOPHIL # BLD: 0.11 K/UL (ref 0.04–0.54)
EOSINOPHIL NFR BLD: 2.3 % (ref 0.7–7)
ERYTHROCYTE [DISTWIDTH] IN BLOOD BY AUTOMATED COUNT: 17.2 % (ref 11.6–14.4)
HCT VFR BLD AUTO: 39.9 % (ref 40.1–51)
HGB BLD-MCNC: 13 G/DL (ref 13.7–17.5)
LYMPHOCYTES # BLD: 1.1 K/UL (ref 1.18–3.74)
LYMPHOCYTES NFR BLD: 22.6 % (ref 19.3–53.1)
MCH RBC QN AUTO: 33 PG (ref 25.7–32.2)
MCHC RBC AUTO-ENTMCNC: 32.6 G/DL (ref 32.3–36.5)
MCV RBC AUTO: 101.3 FL (ref 79–92.2)
MONOCYTES # BLD: 0.46 K/UL (ref 0.24–0.82)
MONOCYTES NFR BLD: 9.4 % (ref 4.7–12.5)
NEUTROPHILS # BLD: 3.14 K/UL (ref 1.56–6.13)
NEUTS SEG NFR BLD: 64.5 % (ref 34–71.1)
PLATELET # BLD AUTO: 119 K/UL (ref 163–337)
PMV BLD AUTO: 9.4 FL (ref 7.4–10.4)
RBC # BLD AUTO: 3.94 M/UL (ref 4.63–6.08)
WBC # BLD AUTO: 4.87 K/UL (ref 4.23–9.07)

## 2023-09-26 PROCEDURE — G8417 CALC BMI ABV UP PARAM F/U: HCPCS | Performed by: INTERNAL MEDICINE

## 2023-09-26 PROCEDURE — 96523 IRRIG DRUG DELIVERY DEVICE: CPT

## 2023-09-26 PROCEDURE — 3077F SYST BP >= 140 MM HG: CPT | Performed by: INTERNAL MEDICINE

## 2023-09-26 PROCEDURE — 1123F ACP DISCUSS/DSCN MKR DOCD: CPT | Performed by: INTERNAL MEDICINE

## 2023-09-26 PROCEDURE — 85025 COMPLETE CBC W/AUTO DIFF WBC: CPT

## 2023-09-26 PROCEDURE — 36415 COLL VENOUS BLD VENIPUNCTURE: CPT

## 2023-09-26 PROCEDURE — G8427 DOCREV CUR MEDS BY ELIG CLIN: HCPCS | Performed by: INTERNAL MEDICINE

## 2023-09-26 PROCEDURE — 99212 OFFICE O/P EST SF 10 MIN: CPT

## 2023-09-26 PROCEDURE — 6360000002 HC RX W HCPCS: Performed by: INTERNAL MEDICINE

## 2023-09-26 PROCEDURE — 2580000003 HC RX 258: Performed by: INTERNAL MEDICINE

## 2023-09-26 PROCEDURE — 3078F DIAST BP <80 MM HG: CPT | Performed by: INTERNAL MEDICINE

## 2023-09-26 PROCEDURE — 99214 OFFICE O/P EST MOD 30 MIN: CPT | Performed by: INTERNAL MEDICINE

## 2023-09-26 PROCEDURE — 1036F TOBACCO NON-USER: CPT | Performed by: INTERNAL MEDICINE

## 2023-09-26 RX ORDER — SODIUM CHLORIDE 0.9 % (FLUSH) 0.9 %
5-40 SYRINGE (ML) INJECTION PRN
Status: DISCONTINUED | OUTPATIENT
Start: 2023-09-26 | End: 2023-09-27 | Stop reason: HOSPADM

## 2023-09-26 RX ORDER — HEPARIN 100 UNIT/ML
500 SYRINGE INTRAVENOUS PRN
Status: DISCONTINUED | OUTPATIENT
Start: 2023-09-26 | End: 2023-09-27 | Stop reason: HOSPADM

## 2023-09-26 RX ADMIN — SODIUM CHLORIDE, PRESERVATIVE FREE 10 ML: 5 INJECTION INTRAVENOUS at 09:34

## 2023-09-26 RX ADMIN — HEPARIN 500 UNITS: 100 SYRINGE at 09:34

## 2023-09-27 ENCOUNTER — HOSPITAL ENCOUNTER (OUTPATIENT)
Dept: NUCLEAR MEDICINE | Age: 83
Discharge: HOME | End: 2023-09-29
Payer: MEDICARE

## 2023-09-27 DIAGNOSIS — R91.1 LUNG NODULE SEEN ON IMAGING STUDY: ICD-10-CM

## 2023-09-27 LAB
GLUCOSE BLD-MCNC: 156 MG/DL (ref 70–99)
PERFORMED ON: ABNORMAL

## 2023-09-27 PROCEDURE — 3430000000 HC RX DIAGNOSTIC RADIOPHARMACEUTICAL: Performed by: INTERNAL MEDICINE

## 2023-09-27 PROCEDURE — 78815 PET IMAGE W/CT SKULL-THIGH: CPT

## 2023-09-27 PROCEDURE — 82962 GLUCOSE BLOOD TEST: CPT

## 2023-09-27 PROCEDURE — A9552 F18 FDG: HCPCS | Performed by: INTERNAL MEDICINE

## 2023-09-27 RX ORDER — FLUDEOXYGLUCOSE F 18 200 MCI/ML
10 INJECTION, SOLUTION INTRAVENOUS
Status: COMPLETED | OUTPATIENT
Start: 2023-09-27 | End: 2023-09-27

## 2023-09-27 RX ADMIN — FLUDEOXYGLUCOSE F 18 10 MILLICURIE: 200 INJECTION, SOLUTION INTRAVENOUS at 13:47

## 2023-10-09 NOTE — PROGRESS NOTES
MEDICAL ONCOLOGY PROGRESS NOTE    Pt Name: Yrn Vizcarra  MRN: [id number]  YOB: 1940  Date of evaluation: 11/21/2023    HISTORY OF PRESENT ILLNESS:  The patient has a diagnosis of mantle cell lymphoma in 2017. He received 6 cycles of bendamustine/rituximab followed by 2 years of rituximab maintenance completed December 2019. He has been in remission. Patient denies any B symptoms. Denies any peripheral adenopathy. Denies any abdominal pain. Patient had a PET scan performed for work-up of a pulmonary nodule. Diagnosis  B-cell lymphoma, consistent with Mantle cell lymphoma, June 2017  CD20+  Translocation 11, 14  Cyclin D1 positive  SOX-11 positive    Treatment Summary  8/2/17 - 12/19/17 Completion Bendamustine/Rituxan x6 cycles  2/16/18 - 12/18/19 Completion Maintenance Rituxan every 2 months    Hematology/Cancer History  Kain Thompson was first seen by me on 12/8/2021. He presents to [facility] of care of a history of mantle cell lymphoma. He was treated in [place], [place]. He received induction chemotherapy with bendamustine/rituximab followed by 2 years of maintenance rituximab.  12/16/16 US Abdominal ([facility], [place]):  20 cm x 9.7 cm splenomegaly  5/19/17 CT chest, abdomen, pelvis ([address], [place]): Splenomegaly is similar to the previous exam. No evidence of adenopathy involving the chest abdomen or pelvis. Small nonspecific granular nodule the right upper lobe. Follow-up imaging to assess stability recommended. 6/26/17 Bone marrow ([facility], [place], [place]): Mildly hypercellular, 40%. CD20+ B-cell lymphoma consistent with mantle cell lymphoma. B-cell lymphoma involves 45%-50% of marrow cellularity. Trilineage hematopoiesis with adequate megakaryocytes. Iron increased (3+/4+). FLOW: CD20+, CD5+, monoclonal B-cell population. Monoclonal B-cell population is CD5+, CD19+, Bright  CD20+, Lambda+, CD23 dim and involves 17% of events.  No

## 2023-10-11 ENCOUNTER — TELEPHONE (OUTPATIENT)
Dept: HEMATOLOGY | Age: 83
End: 2023-10-11

## 2023-10-18 ENCOUNTER — TRANSCRIBE ORDERS (OUTPATIENT)
Dept: ADMINISTRATIVE | Facility: HOSPITAL | Age: 83
End: 2023-10-18
Payer: MEDICARE

## 2023-10-18 ENCOUNTER — HOSPITAL ENCOUNTER (OUTPATIENT)
Dept: CT IMAGING | Facility: HOSPITAL | Age: 83
Discharge: HOME | End: 2023-10-18
Admitting: FAMILY MEDICINE
Payer: MEDICARE

## 2023-10-18 DIAGNOSIS — H53.461 RIGHT HOMONYMOUS HEMIANOPSIA: Primary | ICD-10-CM

## 2023-10-18 DIAGNOSIS — H53.461 RIGHT HOMONYMOUS HEMIANOPSIA: ICD-10-CM

## 2023-10-18 LAB — CREAT BLDA-MCNC: 1.3 MG/DL (ref 0.6–1.3)

## 2023-10-18 PROCEDURE — 70496 CT ANGIOGRAPHY HEAD: CPT

## 2023-10-18 PROCEDURE — 82565 ASSAY OF CREATININE: CPT

## 2023-10-18 PROCEDURE — 25510000001 IOPAMIDOL PER 1 ML: Performed by: FAMILY MEDICINE

## 2023-10-18 PROCEDURE — 70498 CT ANGIOGRAPHY NECK: CPT

## 2023-10-18 RX ADMIN — IOPAMIDOL 100 ML: 755 INJECTION, SOLUTION INTRAVENOUS at 14:43

## 2023-10-31 ENCOUNTER — OFFICE VISIT (OUTPATIENT)
Dept: NEUROLOGY | Facility: CLINIC | Age: 83
End: 2023-10-31
Payer: OTHER GOVERNMENT

## 2023-10-31 ENCOUNTER — PATIENT ROUNDING (BHMG ONLY) (OUTPATIENT)
Dept: NEUROLOGY | Facility: CLINIC | Age: 83
End: 2023-10-31
Payer: MEDICARE

## 2023-10-31 VITALS
BODY MASS INDEX: 25.47 KG/M2 | HEIGHT: 72 IN | HEART RATE: 60 BPM | WEIGHT: 188 LBS | DIASTOLIC BLOOD PRESSURE: 50 MMHG | SYSTOLIC BLOOD PRESSURE: 130 MMHG | OXYGEN SATURATION: 95 %

## 2023-10-31 DIAGNOSIS — C44.319 BASAL CELL CARCINOMA (BCC) OF SKIN OF OTHER PART OF FACE: ICD-10-CM

## 2023-10-31 DIAGNOSIS — I69.30 HISTORY OF STROKE WITH RESIDUAL DEFICIT: ICD-10-CM

## 2023-10-31 DIAGNOSIS — E78.5 HYPERLIPIDEMIA, UNSPECIFIED HYPERLIPIDEMIA TYPE: ICD-10-CM

## 2023-10-31 DIAGNOSIS — I10 HYPERTENSION, UNSPECIFIED TYPE: ICD-10-CM

## 2023-10-31 DIAGNOSIS — I48.91 ATRIAL FIBRILLATION, UNSPECIFIED TYPE: ICD-10-CM

## 2023-10-31 DIAGNOSIS — H53.461 HOMONYMOUS HEMIANOPIA, RIGHT: Primary | ICD-10-CM

## 2023-10-31 RX ORDER — ROSUVASTATIN CALCIUM 40 MG/1
1 TABLET, COATED ORAL DAILY
COMMUNITY
Start: 2023-09-28

## 2023-10-31 RX ORDER — LANOLIN ALCOHOL/MO/W.PET/CERES
1000 CREAM (GRAM) TOPICAL DAILY
COMMUNITY

## 2023-10-31 RX ORDER — KETOCONAZOLE 20 MG/G
CREAM TOPICAL
COMMUNITY
Start: 2023-10-27

## 2023-10-31 RX ORDER — MULTIVITAMIN WITH IRON
1 TABLET ORAL DAILY
COMMUNITY

## 2023-10-31 RX ORDER — KETOCONAZOLE 20 MG/ML
SHAMPOO TOPICAL
COMMUNITY
Start: 2023-10-27

## 2023-10-31 NOTE — PROGRESS NOTES
October 31, 2023    Hello, may I speak with Juan Carlos Marsh?    My name is Milly      I am  with [facility]  [address]  [address]  [facility] [address] [phone].    Before we get started may I verify your date of birth? 1940    I am calling to officially welcome you to our practice and ask about your recent visit. Is this a good time to talk? yes    Tell me about your visit with us. What things went well?  everything       We're always looking for ways to make our patients' experiences even better. Do you have recommendations on ways we may improve?  no    Overall were you satisfied with your first visit to our practice? yes       I appreciate you taking the time to speak with me today. Is there anything else I can do for you? no      Thank you, and have a great day.

## 2023-10-31 NOTE — PROGRESS NOTES
Neurology Consult Note    Referring Provider:   Ridge Tomas MD     Reason for Consultation:    Vision Change  Right Homonymous Hemianopsia    Subjective   History of Present Illness:  Juan Carlos Marsh is a 83 y.o. male who presents today for vision change of right homonymous Hemianopsia.  He is routinely followed by Ridge Tomas MD for primary care.     Vision Changes  He reports that he has been having vision changes for some time secondary to diabetic retinopathy. However, back in September he noted worsening of these symptoms with his vision loss being somewhere from midline to the right.  He notes this in the right eye.  His optometrist noted homonymous hemianopsia.  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  He denies any significant vision loss associated to that stroke.  It was being managed in [place] and there are no records for review.  It occurred in 2018.     His PCP ordered cerebrovascular imaging with CTA head/neck.  These studies were reviewed.  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    He is being managed for secondary stroke prevention with Eliquis 5mg BID due to underlying atrial fibrillation. He also takes Crestor 40mg for hyperlipidemia.  He doesn't smoke.  He has hypertension and is on multiple different antihypertensives.  He notes his BP is elevated usually in the AM.  He also has diabetes with an elevated A1C but is unable to give me his las A1C today.      He also has a history of skin cancer and Mantle Cell Lymphoma that has been in remission for some time. He follows with Dr. Lozano with oncology.      Allergies:    Patient has no known allergies.    Medications:  Current Outpatient Medications   Medication Sig Dispense Refill    amiodarone (PACERONE) 200 MG tablet Take 1 tablet by mouth Daily.      amLODIPine (NORVASC) 10 MG tablet Take 1 tablet by mouth Daily.       azelastine (ASTELIN) 0.1 % nasal spray 2 sprays into the nostril(s) as directed by provider 2 (Two) Times a Day. Use in each nostril as directed      cholecalciferol (VITAMIN D3) 25 MCG (1000 UT) tablet Take 2 tablets by mouth Every Night.      Contour Next Test test strip 1 each by Other route Daily.      [facility] CINNAMON PO Take 1,000 mg by mouth Daily.      Eliquis 5 MG tablet tablet Take 1 tablet by mouth 2 (Two) Times a Day.      famotidine (Pepcid) 20 MG tablet Take 1 tablet by mouth 2 (Two) Times a Day. 60 tablet 3    fluticasone (FLONASE) 50 MCG/ACT nasal spray 2 sprays into the nostril(s) as directed by provider Daily.      ipratropium (ATROVENT) 0.06 % nasal spray 2 sprays into the nostril(s) as directed by provider 3 (Three) Times a Day. 45 mL 3    ketoconazole (NIZORAL) 2 % cream APPLY CREAM TWICE A DAY TO AFFECTED AREAS WHEN AND WHERE RASH IS PRESENT.      ketoconazole (NIZORAL) 2 % shampoo APPLY TO SCALP 3 DAYS A WEEK. ALLOW SHAMPOO TO SIT FOR 10 MIN BEFORE WASHING OUT.      levothyroxine (SYNTHROID, LEVOTHROID) 75 MCG tablet take 1 tablet by mouth every day in the morning on empty stomach      Magnesium 250 MG tablet Take 1 tablet by mouth Daily.      metFORMIN (GLUCOPHAGE) 500 MG tablet take 1 tablet by mouth every day with meals      olmesartan (BENICAR) 40 MG tablet olmesartan 40 mg tablet   TAKE 1 TABLET BY MOUTH ONCE DAILY      rosuvastatin (CRESTOR) 40 MG tablet Take 1 tablet by mouth Daily.      tamsulosin (FLOMAX) 0.4 MG capsule 24 hr capsule Take 1 capsule by mouth Daily.      vitamin B-12 (CYANOCOBALAMIN) 1000 MCG tablet Take 1 tablet by mouth Daily.       No current facility-administered medications for this visit.     Current outpatient and discharge medications have been reconciled for the patient.  Reviewed by: SRAVAN Milan    Past Medical History:  Past Medical History:   Diagnosis Date    Atrial fibrillation     Cancer     Coronary artery disease     Diabetes mellitus     Disease  "of thyroid gland     Dizziness     High blood pressure     High cholesterol     HL (hearing loss)     Memory loss 2020    Stroke    Nosebleed     Sinusitis     Stroke     Vision loss      Past Surgical History:   Procedure Laterality Date    APPENDECTOMY      EXCISION LESION Right 2022    Procedure: Excision of basal cell carcinoma of the right jawline with complex closure;  Surgeon: Ky Godoy MD;  Location: [facility] OR;  Service: ENT;  Laterality: Right;    EYE SURGERY      HEEL SPUR SURGERY      HERNIA REPAIR      PORTACATH PLACEMENT Right     REPLACEMENT TOTAL KNEE Left     SHOULDER SURGERY Right     SKIN LESION EXCISION      TONSILLECTOMY       Family History   Problem Relation Age of Onset    Stroke Father         Passed at age 88    Cancer Brother         Twin Brother     Social History     Tobacco Use    Smoking status: Former     Packs/day: 0.50     Years: 18.00     Additional pack years: 0.00     Total pack years: 9.00     Types: Cigarettes, Pipe, Cigars     Start date: 1958     Quit date: 1976     Years since quittin.7     Passive exposure: Never    Smokeless tobacco: Never    Tobacco comments:     A crazy decision, entered [employer]   Vaping Use    Vaping Use: Never used   Substance Use Topics    Alcohol use: Not Currently    Drug use: Never     Review of Systems   Eyes:  Positive for visual disturbance.         Objective   Vital Signs:  Heart Rate:  [60] 60  BP: (130)/(50) 130/50      10/31/23  1351   Weight: 85.3 kg (188 lb)     182.9 cm (72\")  Body mass index is 25.5 kg/m².    Physical Exam  Vitals reviewed.   Constitutional:       Appearance: Normal appearance.   HENT:      Head: Normocephalic.      Mouth/Throat:      Pharynx: Oropharynx is clear.   Eyes:      General: Lids are normal.      Extraocular Movements: Extraocular movements intact.      Pupils: Pupils are equal, round, and reactive to light.   Cardiovascular:      Rate and Rhythm: Normal rate and regular " rhythm.      Pulses: Normal pulses.   Pulmonary:      Effort: Pulmonary effort is normal.   Musculoskeletal:         General: Normal range of motion.      Cervical back: Normal range of motion and neck supple.   Skin:     General: Skin is warm and dry.      Capillary Refill: Capillary refill takes less than 2 seconds.   Neurological:      Motor: Motor strength is normal.     Coordination: Coordination is intact.      Deep Tendon Reflexes: Reflexes are normal and symmetric.   Psychiatric:         Mood and Affect: Mood normal.         Speech: Speech normal.       Neurological Exam  Mental Status  Awake, alert and oriented to person, place and time. Recent and remote memory are intact. Speech is normal. Language is fluent with no aphasia. Attention and concentration are normal.    Cranial Nerves  CN II: Visual acuity is normal. Right homonymous hemianopsia.  CN III, IV, VI: Extraocular movements intact bilaterally. Normal lids and orbits bilaterally. Pupils equal round and reactive to light bilaterally.  CN V: Facial sensation is normal.  CN VII: Full and symmetric facial movement.  CN IX, X: Palate elevates symmetrically. Normal gag reflex.  CN XI: Shoulder shrug strength is normal.  CN XII: Tongue midline without atrophy or fasciculations.    Motor   Strength is 5/5 throughout all four extremities.    Sensory  Sensation is intact to light touch, pinprick, vibration and proprioception in all four extremities.    Reflexes  Deep tendon reflexes are 2+ and symmetric in all four extremities.    Coordination    Finger-to-nose, rapid alternating movements and heel-to-shin normal bilaterally without dysmetria.    Gait  Normal casual, toe, heel and tandem gait.    Results Review:    Lab Results   Component Value Date    GLUCOSE 169 (H) 06/27/2022    BUN 15 06/27/2022    CREATININE 1.30 10/18/2023    EGFRIFNONA >60 04/08/2022    EGFRIFAFRI >60 04/08/2022    BCR 16.9 06/27/2022    K 4.1 06/27/2022    CO2 29.0 06/27/2022     "CALCIUM 9.6 06/27/2022    ALBUMIN 4.10 06/27/2022    AST 33 06/27/2022    ALT 47 (H) 06/27/2022     Lab Results   Component Value Date    WBC 4.87 09/26/2023    HGB 13.0 (L) 09/26/2023    HCT 39.9 (L) 09/26/2023    .3 (H) 09/26/2023     (L) 09/26/2023     Lab Results   Component Value Date    CHLPL 129 (L) 08/09/2021    TRIG 84 08/09/2021    HDL 48 (L) 08/09/2021    LDL 64 08/09/2021     No results found for: \"TSH\"  Lab Results   Component Value Date    HGBA1C 5.5 08/09/2021     No results found for: \"FOLATE\"  No results found for: \"WPLJESXE69\"    CT Angiogram Head (10/18/2023 14:40)  CT Angiogram Neck (10/18/2023 14:40)    Chart Review:  SUMMARY OF CARE - SCAN - [facility]-10.20.23 (10/20/2023)  PROGRESS NOTES - SCAN - PROG NOTE_[facility]_10/18/23 (10/18/2023)     Plan .  Impression:  Juan Carlos Marsh is a 83 y.o. male who presents with a right homonymous hemianopsia.  He does have a history of a left occipital stroke.  I am unsure as to the exact work-up with this as it had been treated previously in [place].  He does remain on Eliquis 5 mg twice daily secondary to atrial fibrillation diagnosis.  It is possible that atrial fibrillation is the etiology to his previous stroke.  However I do remain somewhat concerned at this point for an expansion of his stroke due to the worsening of his vision.  Reportedly, he had been having some diabetic retinopathy concerns with vision loss at a 10% angle out of his right.  However, I am concerned that this may have actually been associated to stroke and this worsening could have been expansion of this.  Additionally, due to his current oncology issues also remain concerned for potential metastasis to the brain.  I think the best action moving forward is to proceed with an MRI with and without contrast for better evaluation of his current situation.  He is agreeable with doing this.  Further work-up may be necessary once we obtain " her MRI.  He is understanding and agreeable with this today.  I have recommended that he not drive secondary to his homonymous hemianopsia he is understanding.    Plan:  MRI brain with and without contrast  Continue Eliquis  Continue Crestor  Systolic blood pressure goal less than 140  LDL goal less than 70  A1c goal less than 6.5%  No driving  Present to the ED with any new signs symptoms of stroke.    The patient and I have discussed the plan of care and he is in full agreement at this time.     Follow-Up:  No follow-ups on file.         Jorge A Agosto, APRN  11/01/23  12:15 CDT

## 2023-11-20 DIAGNOSIS — Z08 ENCOUNTER FOR FOLLOW-UP SURVEILLANCE OF LYMPHOMA: Primary | ICD-10-CM

## 2023-11-20 DIAGNOSIS — Z85.72 ENCOUNTER FOR FOLLOW-UP SURVEILLANCE OF LYMPHOMA: Primary | ICD-10-CM

## 2023-11-21 ENCOUNTER — HOSPITAL ENCOUNTER (OUTPATIENT)
Dept: INFUSION THERAPY | Age: 83
Discharge: HOME | End: 2023-11-21
Payer: MEDICARE

## 2023-11-21 ENCOUNTER — OFFICE VISIT (OUTPATIENT)
Dept: HEMATOLOGY | Age: 83
End: 2023-11-21
Payer: MEDICARE

## 2023-11-21 VITALS
DIASTOLIC BLOOD PRESSURE: 72 MMHG | HEART RATE: 62 BPM | SYSTOLIC BLOOD PRESSURE: 128 MMHG | WEIGHT: 190.7 LBS | OXYGEN SATURATION: 96 % | HEIGHT: 74 IN | TEMPERATURE: 96.9 F | BODY MASS INDEX: 24.47 KG/M2

## 2023-11-21 DIAGNOSIS — C85.90 NON-HODGKIN'S LYMPHOMA, UNSPECIFIED BODY REGION, UNSPECIFIED NON-HODGKIN LYMPHOMA TYPE (HCC): ICD-10-CM

## 2023-11-21 DIAGNOSIS — Z85.72 ENCOUNTER FOR FOLLOW-UP SURVEILLANCE OF LYMPHOMA: Primary | ICD-10-CM

## 2023-11-21 DIAGNOSIS — Z08 ENCOUNTER FOR FOLLOW-UP SURVEILLANCE OF LYMPHOMA: Primary | ICD-10-CM

## 2023-11-21 DIAGNOSIS — R53.83 OTHER FATIGUE: ICD-10-CM

## 2023-11-21 DIAGNOSIS — C83.18 MANTLE CELL LYMPHOMA OF LYMPH NODES OF MULTIPLE REGIONS (HCC): Primary | ICD-10-CM

## 2023-11-21 DIAGNOSIS — Z71.89 CARE PLAN DISCUSSED WITH PATIENT: ICD-10-CM

## 2023-11-21 DIAGNOSIS — D75.89 BICYTOPENIA: ICD-10-CM

## 2023-11-21 LAB
ALBUMIN SERPL-MCNC: 3.9 G/DL (ref 3.5–5.2)
ALP SERPL-CCNC: 130 U/L (ref 40–130)
ALT SERPL-CCNC: 31 U/L (ref 21–72)
ANION GAP SERPL CALCULATED.3IONS-SCNC: 7 MMOL/L (ref 7–19)
AST SERPL-CCNC: 33 U/L (ref 17–59)
BASOPHILS # BLD: 0.03 K/UL (ref 0.01–0.08)
BASOPHILS NFR BLD: 0.6 % (ref 0.1–1.2)
BILIRUB SERPL-MCNC: 0.5 MG/DL (ref 0.2–1.3)
BUN SERPL-MCNC: 18 MG/DL (ref 9–20)
CALCIUM SERPL-MCNC: 10.2 MG/DL (ref 8.4–10.2)
CHLORIDE SERPL-SCNC: 104 MMOL/L (ref 98–111)
CO2 SERPL-SCNC: 29 MMOL/L (ref 22–29)
CREAT SERPL-MCNC: 1.2 MG/DL (ref 0.6–1.2)
EOSINOPHIL # BLD: 0.12 K/UL (ref 0.04–0.54)
EOSINOPHIL NFR BLD: 2.5 % (ref 0.7–7)
ERYTHROCYTE [DISTWIDTH] IN BLOOD BY AUTOMATED COUNT: 13.3 % (ref 11.6–14.4)
GLOBULIN: 2.4 G/DL
GLUCOSE SERPL-MCNC: 121 MG/DL (ref 74–106)
HCT VFR BLD AUTO: 34.5 % (ref 40.1–51)
HGB BLD-MCNC: 11.5 G/DL (ref 13.7–17.5)
LYMPHOCYTES # BLD: 0.76 K/UL (ref 1.18–3.74)
LYMPHOCYTES NFR BLD: 16 % (ref 19.3–53.1)
MCH RBC QN AUTO: 31.4 PG (ref 25.7–32.2)
MCHC RBC AUTO-ENTMCNC: 33.3 G/DL (ref 32.3–36.5)
MCV RBC AUTO: 94.3 FL (ref 79–92.2)
MONOCYTES # BLD: 0.42 K/UL (ref 0.24–0.82)
MONOCYTES NFR BLD: 8.9 % (ref 4.7–12.5)
NEUTROPHILS # BLD: 3.39 K/UL (ref 1.56–6.13)
NEUTS SEG NFR BLD: 71.6 % (ref 34–71.1)
PLATELET # BLD AUTO: 128 K/UL (ref 163–337)
PMV BLD AUTO: 9.4 FL (ref 7.4–10.4)
POTASSIUM SERPL-SCNC: 5.1 MMOL/L (ref 3.5–5.1)
PROT SERPL-MCNC: 6.3 G/DL (ref 6.3–8.2)
RBC # BLD AUTO: 3.66 M/UL (ref 4.63–6.08)
SODIUM SERPL-SCNC: 140 MMOL/L (ref 137–145)
TSH SERPL DL<=0.005 MIU/L-ACNC: 5.81 UIU/ML (ref 0.27–4.2)
WBC # BLD AUTO: 4.74 K/UL (ref 4.23–9.07)

## 2023-11-21 PROCEDURE — 1036F TOBACCO NON-USER: CPT | Performed by: INTERNAL MEDICINE

## 2023-11-21 PROCEDURE — 3078F DIAST BP <80 MM HG: CPT | Performed by: INTERNAL MEDICINE

## 2023-11-21 PROCEDURE — 3074F SYST BP LT 130 MM HG: CPT | Performed by: INTERNAL MEDICINE

## 2023-11-21 PROCEDURE — 96523 IRRIG DRUG DELIVERY DEVICE: CPT

## 2023-11-21 PROCEDURE — 36415 COLL VENOUS BLD VENIPUNCTURE: CPT

## 2023-11-21 PROCEDURE — 80053 COMPREHEN METABOLIC PANEL: CPT

## 2023-11-21 PROCEDURE — G8420 CALC BMI NORM PARAMETERS: HCPCS | Performed by: INTERNAL MEDICINE

## 2023-11-21 PROCEDURE — 85025 COMPLETE CBC W/AUTO DIFF WBC: CPT

## 2023-11-21 PROCEDURE — 2580000003 HC RX 258

## 2023-11-21 PROCEDURE — 99213 OFFICE O/P EST LOW 20 MIN: CPT | Performed by: INTERNAL MEDICINE

## 2023-11-21 PROCEDURE — 99212 OFFICE O/P EST SF 10 MIN: CPT

## 2023-11-21 PROCEDURE — 1123F ACP DISCUSS/DSCN MKR DOCD: CPT | Performed by: INTERNAL MEDICINE

## 2023-11-21 PROCEDURE — 6360000002 HC RX W HCPCS

## 2023-11-21 PROCEDURE — G8427 DOCREV CUR MEDS BY ELIG CLIN: HCPCS | Performed by: INTERNAL MEDICINE

## 2023-11-21 PROCEDURE — G8484 FLU IMMUNIZE NO ADMIN: HCPCS | Performed by: INTERNAL MEDICINE

## 2023-11-21 RX ORDER — HEPARIN 100 UNIT/ML
500 SYRINGE INTRAVENOUS PRN
Status: DISCONTINUED | OUTPATIENT
Start: 2023-11-21 | End: 2023-11-22 | Stop reason: HOSPADM

## 2023-11-21 RX ORDER — SODIUM CHLORIDE 0.9 % (FLUSH) 0.9 %
5-40 SYRINGE (ML) INJECTION PRN
Status: DISCONTINUED | OUTPATIENT
Start: 2023-11-21 | End: 2023-11-22 | Stop reason: HOSPADM

## 2023-11-21 RX ADMIN — HEPARIN 500 UNITS: 100 SYRINGE at 09:31

## 2023-11-21 RX ADMIN — SODIUM CHLORIDE, PRESERVATIVE FREE 10 ML: 5 INJECTION INTRAVENOUS at 09:31

## 2023-11-28 ENCOUNTER — TRANSCRIBE ORDERS (OUTPATIENT)
Dept: ADMINISTRATIVE | Facility: HOSPITAL | Age: 83
End: 2023-11-28
Payer: MEDICARE

## 2023-11-28 ENCOUNTER — HOSPITAL ENCOUNTER (OUTPATIENT)
Dept: GENERAL RADIOLOGY | Facility: HOSPITAL | Age: 83
Discharge: HOME | End: 2023-11-28
Payer: MEDICARE

## 2023-11-28 DIAGNOSIS — R05.9 COUGH, UNSPECIFIED TYPE: ICD-10-CM

## 2023-11-28 DIAGNOSIS — R05.9 COUGH, UNSPECIFIED TYPE: Primary | ICD-10-CM

## 2023-11-28 PROCEDURE — 71046 X-RAY EXAM CHEST 2 VIEWS: CPT

## 2023-12-04 ENCOUNTER — HOSPITAL ENCOUNTER (OUTPATIENT)
Dept: MRI IMAGING | Facility: HOSPITAL | Age: 83
Discharge: HOME | End: 2023-12-04
Admitting: NURSE PRACTITIONER
Payer: MEDICARE

## 2023-12-04 DIAGNOSIS — I69.30 HISTORY OF STROKE WITH RESIDUAL DEFICIT: ICD-10-CM

## 2023-12-04 DIAGNOSIS — H53.461 HOMONYMOUS HEMIANOPIA, RIGHT: ICD-10-CM

## 2023-12-04 PROCEDURE — A9577 INJ MULTIHANCE: HCPCS | Performed by: NURSE PRACTITIONER

## 2023-12-04 PROCEDURE — 70553 MRI BRAIN STEM W/O & W/DYE: CPT

## 2023-12-04 PROCEDURE — 0 GADOBENATE DIMEGLUMINE 529 MG/ML SOLUTION: Performed by: NURSE PRACTITIONER

## 2023-12-04 RX ADMIN — GADOBENATE DIMEGLUMINE 15 ML: 529 INJECTION, SOLUTION INTRAVENOUS at 08:27

## 2023-12-29 ENCOUNTER — TELEPHONE (OUTPATIENT)
Dept: NEUROLOGY | Facility: CLINIC | Age: 83
End: 2023-12-29
Payer: MEDICARE

## 2023-12-29 NOTE — TELEPHONE ENCOUNTER
----- Message from SRAVAN Milan sent at 12/4/2023 12:11 PM CST -----  No new strokes seen.  There are the areas of old stroke that were known about.  I would continue his current treatments and we can discuss this at his next visit.

## 2023-12-29 NOTE — TELEPHONE ENCOUNTER
CALLED PATIENT WITH MRI RESULTS. HE VOICED UNDERSTANDING AND I TOLD HIM HE CAN SPEAK WITH [facility] WITH ANY FURTHER QUESTIONS OR CONCERNS AT HIS VISIT ON 1/22/24

## 2024-01-09 ENCOUNTER — TELEPHONE (OUTPATIENT)
Dept: CARDIOLOGY CLINIC | Age: 84
End: 2024-01-09

## 2024-01-11 ENCOUNTER — TELEPHONE (OUTPATIENT)
Dept: NEUROLOGY | Facility: CLINIC | Age: 84
End: 2024-01-11
Payer: MEDICARE

## 2024-01-11 NOTE — TELEPHONE ENCOUNTER
CALLED PATIENT TO GET THEM RESCHEDULED FOR THEIR APPOINTMENT THEY HAVE WITH [facility] ON 1/22 DUE TO HIM BEING OUT FOR SURGERY. PATIENT DID NOT ANSWER, LEFT A DETAILED VOICEMAIL WITH A REQUEST FOR A RETURN CALL.

## 2024-01-19 ENCOUNTER — TELEPHONE (OUTPATIENT)
Dept: NEUROLOGY | Facility: CLINIC | Age: 84
End: 2024-01-19
Payer: MEDICARE

## 2024-01-19 NOTE — TELEPHONE ENCOUNTER
CALLED AND SPOKE WITH PATIENT TO GET HIS RESCHEDULED FROM APPOINTMENT WITH [facility] ON MONDAY 1/22. MOVED HIS APPOINTMENT TO THURSDAY 25TH @ 930. PATIENT IS AWARE OF DATE AND TIME.

## 2024-01-24 ENCOUNTER — OFFICE VISIT (OUTPATIENT)
Dept: CARDIOLOGY CLINIC | Age: 84
End: 2024-01-24
Payer: MEDICARE

## 2024-01-24 VITALS
DIASTOLIC BLOOD PRESSURE: 50 MMHG | BODY MASS INDEX: 23.61 KG/M2 | HEIGHT: 74 IN | WEIGHT: 184 LBS | SYSTOLIC BLOOD PRESSURE: 110 MMHG | HEART RATE: 66 BPM

## 2024-01-24 DIAGNOSIS — E78.5 HYPERLIPIDEMIA, UNSPECIFIED HYPERLIPIDEMIA TYPE: ICD-10-CM

## 2024-01-24 DIAGNOSIS — I48.0 PAROXYSMAL ATRIAL FIBRILLATION (HCC): Primary | ICD-10-CM

## 2024-01-24 DIAGNOSIS — I10 HYPERTENSION, UNSPECIFIED TYPE: ICD-10-CM

## 2024-01-24 DIAGNOSIS — Z79.01 CHRONIC ANTICOAGULATION: ICD-10-CM

## 2024-01-24 DIAGNOSIS — Z79.899 ON AMIODARONE THERAPY: ICD-10-CM

## 2024-01-24 PROCEDURE — 99214 OFFICE O/P EST MOD 30 MIN: CPT | Performed by: NURSE PRACTITIONER

## 2024-01-24 PROCEDURE — 3074F SYST BP LT 130 MM HG: CPT | Performed by: NURSE PRACTITIONER

## 2024-01-24 PROCEDURE — 93000 ELECTROCARDIOGRAM COMPLETE: CPT | Performed by: NURSE PRACTITIONER

## 2024-01-24 PROCEDURE — 1123F ACP DISCUSS/DSCN MKR DOCD: CPT | Performed by: NURSE PRACTITIONER

## 2024-01-24 PROCEDURE — G8427 DOCREV CUR MEDS BY ELIG CLIN: HCPCS | Performed by: NURSE PRACTITIONER

## 2024-01-24 PROCEDURE — G8420 CALC BMI NORM PARAMETERS: HCPCS | Performed by: NURSE PRACTITIONER

## 2024-01-24 PROCEDURE — 1036F TOBACCO NON-USER: CPT | Performed by: NURSE PRACTITIONER

## 2024-01-24 PROCEDURE — 3078F DIAST BP <80 MM HG: CPT | Performed by: NURSE PRACTITIONER

## 2024-01-24 PROCEDURE — G8484 FLU IMMUNIZE NO ADMIN: HCPCS | Performed by: NURSE PRACTITIONER

## 2024-01-24 RX ORDER — AMIODARONE HYDROCHLORIDE 200 MG/1
200 TABLET ORAL DAILY
Qty: 30 TABLET | Refills: 5 | Status: SHIPPED | OUTPATIENT
Start: 2024-01-24

## 2024-01-24 RX ORDER — AMLODIPINE BESYLATE 10 MG/1
10 TABLET ORAL DAILY
Qty: 30 TABLET | Refills: 5 | Status: SHIPPED | OUTPATIENT
Start: 2024-01-24

## 2024-01-24 RX ORDER — OLMESARTAN MEDOXOMIL 40 MG/1
40 TABLET ORAL DAILY
Qty: 30 TABLET | Refills: 5 | Status: SHIPPED | OUTPATIENT
Start: 2024-01-24

## 2024-01-24 NOTE — PROGRESS NOTES
levels 11/21/23 next due 5/21/23.     2. Chest X-ray: (done annually)  Last 11/28/23 next due 11/28/24.     3. Pulmonary Function Study: ( done annually) 6/21/22  next due Now.     4. Eye exam: (done annually) Last September 2023 next due September 2024    5. EKG: QTc: 406 msec      Assessment, Recommendations, & Plan:  83 y.o. male with PAF, Chronic anticoagulation, Amiodarone Therapy, HTN, & HLD    PAF/Chronic anticoagulation/Amiodarone therapy-In sinus rhythm on Amiodarone.  Anticoagulated on Eliquis without bleeding issues.  Continue current regimen    HTN-controlled on Norvasc, hydrochlorothiazide, & Benicar.  Continue current regimen    HLD-Followed by PCP, on Lipitor.      Disposition - RTC in 6 months with Dr. Enriquez or sooner if needed      Please do not hesitate to contact me for any questions or concerns.    Sincerely yours,    LULA Alvarado

## 2024-01-31 ENCOUNTER — OFFICE VISIT (OUTPATIENT)
Dept: NEUROLOGY | Facility: CLINIC | Age: 84
End: 2024-01-31
Payer: MEDICARE

## 2024-01-31 VITALS
BODY MASS INDEX: 24.65 KG/M2 | SYSTOLIC BLOOD PRESSURE: 150 MMHG | WEIGHT: 182 LBS | HEIGHT: 72 IN | OXYGEN SATURATION: 98 % | HEART RATE: 80 BPM | DIASTOLIC BLOOD PRESSURE: 62 MMHG

## 2024-01-31 DIAGNOSIS — I65.21 STENOSIS OF RIGHT CAROTID ARTERY: ICD-10-CM

## 2024-01-31 DIAGNOSIS — E78.5 HYPERLIPIDEMIA, UNSPECIFIED HYPERLIPIDEMIA TYPE: ICD-10-CM

## 2024-01-31 DIAGNOSIS — I48.91 ATRIAL FIBRILLATION, UNSPECIFIED TYPE: ICD-10-CM

## 2024-01-31 DIAGNOSIS — H53.461 HOMONYMOUS HEMIANOPIA, RIGHT: ICD-10-CM

## 2024-01-31 DIAGNOSIS — I10 HYPERTENSION, UNSPECIFIED TYPE: ICD-10-CM

## 2024-01-31 DIAGNOSIS — I69.30 HISTORY OF STROKE WITH RESIDUAL DEFICIT: Primary | ICD-10-CM

## 2024-01-31 PROCEDURE — 1160F RVW MEDS BY RX/DR IN RCRD: CPT | Performed by: NURSE PRACTITIONER

## 2024-01-31 PROCEDURE — 1159F MED LIST DOCD IN RCRD: CPT | Performed by: NURSE PRACTITIONER

## 2024-01-31 PROCEDURE — 99214 OFFICE O/P EST MOD 30 MIN: CPT | Performed by: NURSE PRACTITIONER

## 2024-01-31 NOTE — PROGRESS NOTES
Neurology Progress Note    Chief Complaint:    Vision Change  Right Homonymous Hemianopsia  Stroke    Subjective   History of Present Illness:  Juan Carlos Marsh is a 83 y.o. male who presents today for vision change of right homonymous Hemianopsia.  He is routinely followed by Ridge Tomas MD for primary care.     Stroke/Homonymous Hemianopsia  He continues with right sided hemianopsia without any new changes.  We did get MRI testing that shows areas of old stroke in the left cerebellum and occiput.  The area in the occiput is cortical and would make sense of his current symptoms.    He denies any new onset of symptom such as weakness, numbness/tingling, or speech changes.  Further discussion about the onset of his visual changes report that he actually had to stop his Eliquis for an abdominal procedure back in late September/early October.  It was in early October that he notes onset of the symptoms.  He does have paroxysmal atrial fibrillation.     I still have no records for review from [place] and do not have previous MRI available for comparison.    He continues with Crestor and Eliquis for secondary stroke prevention and denies any recent missed doses.  He continues to follow with PCP for his diabetes, hypertension, and hyperlipidemia.    Allergies:    Patient has no known allergies.    Medications:  Current Outpatient Medications   Medication Sig Dispense Refill    albuterol sulfate  (90 Base) MCG/ACT inhaler Inhale 1 puff Every 4 (Four) Hours As Needed.      amiodarone (PACERONE) 200 MG tablet Take 1 tablet by mouth Daily.      amLODIPine (NORVASC) 10 MG tablet Take 1 tablet by mouth Daily.      azelastine (ASTELIN) 0.1 % nasal spray 2 sprays into the nostril(s) as directed by provider 2 (Two) Times a Day. Use in each nostril as directed      cholecalciferol (VITAMIN D3) 25 MCG (1000 UT) tablet Take 2 tablets by mouth Every Night.      Contour Next Test test strip 1 each by Other route Daily.       [facility] CINNAMON PO Take 1,000 mg by mouth Daily.      Eliquis 5 MG tablet tablet Take 1 tablet by mouth 2 (Two) Times a Day.      famotidine (Pepcid) 20 MG tablet Take 1 tablet by mouth 2 (Two) Times a Day. 60 tablet 3    fluticasone (FLONASE) 50 MCG/ACT nasal spray 2 sprays into the nostril(s) as directed by provider Daily.      ipratropium (ATROVENT) 0.06 % nasal spray 2 sprays into the nostril(s) as directed by provider 3 (Three) Times a Day. 45 mL 3    ketoconazole (NIZORAL) 2 % cream APPLY CREAM TWICE A DAY TO AFFECTED AREAS WHEN AND WHERE RASH IS PRESENT.      ketoconazole (NIZORAL) 2 % shampoo APPLY TO SCALP 3 DAYS A WEEK. ALLOW SHAMPOO TO SIT FOR 10 MIN BEFORE WASHING OUT.      levothyroxine (SYNTHROID, LEVOTHROID) 75 MCG tablet take 1 tablet by mouth every day in the morning on empty stomach      Magnesium 250 MG tablet Take 1 tablet by mouth Daily.      metFORMIN (GLUCOPHAGE) 500 MG tablet take 1 tablet by mouth every day with meals      olmesartan (BENICAR) 40 MG tablet olmesartan 40 mg tablet   TAKE 1 TABLET BY MOUTH ONCE DAILY      rosuvastatin (CRESTOR) 40 MG tablet Take 1 tablet by mouth Daily.      tamsulosin (FLOMAX) 0.4 MG capsule 24 hr capsule Take 1 capsule by mouth Daily.      vitamin B-12 (CYANOCOBALAMIN) 1000 MCG tablet Take 1 tablet by mouth Daily.       No current facility-administered medications for this visit.     Current outpatient and discharge medications have been reconciled for the patient.  Reviewed by: SRAVAN Milan    Past Medical History:  Past Medical History:   Diagnosis Date    Atrial fibrillation     Cancer     Coronary artery disease     Diabetes mellitus     Disease of thyroid gland     Dizziness     High blood pressure     High cholesterol     HL (hearing loss)     Memory loss 05/2020    Stroke    Nosebleed     Sinusitis     Stroke     Vision loss      Past Surgical History:   Procedure Laterality Date    APPENDECTOMY      EXCISION LESION Right 06/29/2022     "Procedure: Excision of basal cell carcinoma of the right jawline with complex closure;  Surgeon: Ky Godoy MD;  Location: [facility] OR;  Service: ENT;  Laterality: Right;    EYE SURGERY      HEEL SPUR SURGERY      HERNIA REPAIR      PORTACATH PLACEMENT Right     REPLACEMENT TOTAL KNEE Left     SHOULDER SURGERY Right     SKIN LESION EXCISION      TONSILLECTOMY       Family History   Problem Relation Age of Onset    Stroke Father         Passed at age 88    Cancer Brother         Twin Brother     Social History     Tobacco Use    Smoking status: Former     Packs/day: 0.50     Years: 18.00     Additional pack years: 0.00     Total pack years: 9.00     Types: Cigarettes, Pipe, Cigars     Start date: 1958     Quit date: 1976     Years since quittin.9     Passive exposure: Never    Smokeless tobacco: Never    Tobacco comments:     A crazy decision, entered [employer]   Vaping Use    Vaping Use: Never used   Substance Use Topics    Alcohol use: Not Currently    Drug use: Never     Review of Systems   Eyes:  Positive for visual disturbance.         Objective   Vital Signs:  Heart Rate:  [80] 80  BP: (150)/(62) 150/62      24  1124   Weight: 82.6 kg (182 lb)     182.9 cm (72\")  Body mass index is 24.68 kg/m².    Physical Exam  Vitals reviewed.   Constitutional:       Appearance: Normal appearance.   HENT:      Head: Normocephalic.      Mouth/Throat:      Pharynx: Oropharynx is clear.   Eyes:      General: Lids are normal.      Extraocular Movements: Extraocular movements intact.      Pupils: Pupils are equal, round, and reactive to light.   Cardiovascular:      Rate and Rhythm: Normal rate and regular rhythm.      Pulses: Normal pulses.   Pulmonary:      Effort: Pulmonary effort is normal.   Musculoskeletal:         General: Normal range of motion.      Cervical back: Normal range of motion and neck supple.   Skin:     General: Skin is warm and dry.      Capillary Refill: Capillary refill takes " less than 2 seconds.   Neurological:      Motor: Motor strength is normal.     Coordination: Coordination is intact.      Deep Tendon Reflexes: Reflexes are normal and symmetric.   Psychiatric:         Mood and Affect: Mood normal.         Speech: Speech normal.       Neurological Exam  Mental Status  Awake, alert and oriented to person, place and time. Recent and remote memory are intact. Speech is normal. Language is fluent with no aphasia. Attention and concentration are normal.    Cranial Nerves  CN II: Visual acuity is normal. Right homonymous hemianopsia.  CN III, IV, VI: Extraocular movements intact bilaterally. Normal lids and orbits bilaterally. Pupils equal round and reactive to light bilaterally.  CN V: Facial sensation is normal.  CN VII: Full and symmetric facial movement.  CN IX, X: Palate elevates symmetrically. Normal gag reflex.  CN XI: Shoulder shrug strength is normal.  CN XII: Tongue midline without atrophy or fasciculations.    Motor   Strength is 5/5 throughout all four extremities.    Sensory  Sensation is intact to light touch, pinprick, vibration and proprioception in all four extremities.    Reflexes  Deep tendon reflexes are 2+ and symmetric in all four extremities.    Coordination    Finger-to-nose, rapid alternating movements and heel-to-shin normal bilaterally without dysmetria.    Gait  Normal casual, toe, heel and tandem gait.    Results Review:    Lab Results   Component Value Date    GLUCOSE 169 (H) 06/27/2022    BUN 15 06/27/2022    CREATININE 1.30 10/18/2023    EGFRIFNONA >60 04/08/2022    EGFRIFAFRI >60 04/08/2022    BCR 16.9 06/27/2022    K 4.1 06/27/2022    CO2 29.0 06/27/2022    CALCIUM 9.6 06/27/2022    ALBUMIN 4.10 06/27/2022    AST 33 06/27/2022    ALT 47 (H) 06/27/2022     Lab Results   Component Value Date    WBC 4.74 11/21/2023    HGB 11.5 (L) 11/21/2023    HCT 34.5 (L) 11/21/2023    MCV 94.3 (H) 11/21/2023     (L) 11/21/2023     Lab Results   Component Value Date  "   CHLPL 129 (L) 08/09/2021    TRIG 84 08/09/2021    HDL 48 (L) 08/09/2021    LDL 64 08/09/2021     Lab Results   Component Value Date    TSH 5.810 (H) 11/21/2023     Lab Results   Component Value Date    HGBA1C 5.5 08/09/2021     No results found for: \"FOLATE\"  No results found for: \"[id number]\"    CT Angiogram Head (10/18/2023 14:40)  CT Angiogram Neck (10/18/2023 14:40)    Chart Review:  SUMMARY OF CARE - SCAN - [facility]-10.20.23 (10/20/2023)  PROGRESS NOTES - SCAN - PROG NOTE_[facility]_10/18/23 (10/18/2023)     Plan .  Impression:  Juan Carlos Marsh is a 83 y.o. male who presents with a right homonymous hemianopsia.  He remains without any new concerns.  He had MRI of the brain that showed no acute/subacute stroke.  However, it does show left occipital chronic stroke.  He now describes a time where he was off Eliquis due to a procedure (these records are as well not available).  This was around the time that he noted the onset of the vision change. It is possible he had another stroke area in the left occiput due to being off Eliquis in the setting of atrial fibrillation. This would explain the onset of the vision changes.  Unfortunately, I do not have MRI for comparison.  We will be attempting to obtain these records to see if I can compare stroke areas.  However, his hemianopsia does correlate with the finding on the MRI of left occipital CVA.  I have recommended strict administration of his Eliquis.   He does have carotid stenosis that I feel he should see vascular surgery for maintenance. I do recommend continued secondary stroke prevention.  We will request records for labs from PCP to evaluate LDL and A1C.  He is understanding.    Plan:  MRI brain with and without contrast  Continue Eliquis  Continue Crestor  Systolic blood pressure goal less than 140  LDL goal less than 70  A1c goal less than 6.5%  No driving  Present to the ED with any new signs symptoms of " stroke.    Diagnoses and all orders for this visit:    1. History of stroke with residual deficit (Primary)    2. Homonymous hemianopia, right    3. Stenosis of right carotid artery  -     Ambulatory Referral to Vascular Surgery    4. Atrial fibrillation, unspecified type    5. Hyperlipidemia, unspecified hyperlipidemia type    6. Hypertension, unspecified type    The patient and I have discussed the plan of care and he is in full agreement at this time.     Follow-Up:  Return in about 6 months (around 7/31/2024) for Stroke.         Jorge A Agosto, APRN  01/31/24  14:05 CST

## 2024-02-01 ENCOUNTER — TELEPHONE (OUTPATIENT)
Dept: NEUROLOGY | Facility: CLINIC | Age: 84
End: 2024-02-01
Payer: MEDICARE

## 2024-02-01 ENCOUNTER — TELEPHONE (OUTPATIENT)
Dept: VASCULAR SURGERY | Facility: CLINIC | Age: 84
End: 2024-02-01
Payer: MEDICARE

## 2024-02-01 NOTE — TELEPHONE ENCOUNTER
CALLED PATIENTS PCP TO SEE WHAT RECENT LABS THAT THEY HAD ON PATIENT. SHE SAID THEY WERE DONE IN DECEMBER-FIRST OF JANUARY. I TOLD HER THOSE WOULD BE FINE AND SHE SAID SHE WOULD FAX THEM OVER.

## 2024-02-01 NOTE — TELEPHONE ENCOUNTER
PATIENT CONFIRMED  APPOINTMENT WITH [facility] ON 2/2/24.    Patient is having surgery on 12/13 and was told to call his PCP to find out what medications he should hold and when prior to surgery.

## 2024-02-01 NOTE — TELEPHONE ENCOUNTER
----- Message from SRAVAN Milan sent at 1/31/2024  2:06 PM CST -----  I need PCP latest labs please.

## 2024-02-02 ENCOUNTER — OFFICE VISIT (OUTPATIENT)
Dept: VASCULAR SURGERY | Facility: CLINIC | Age: 84
End: 2024-02-02
Payer: MEDICARE

## 2024-02-02 VITALS
DIASTOLIC BLOOD PRESSURE: 67 MMHG | WEIGHT: 182 LBS | SYSTOLIC BLOOD PRESSURE: 138 MMHG | OXYGEN SATURATION: 97 % | BODY MASS INDEX: 24.65 KG/M2 | HEIGHT: 72 IN | HEART RATE: 67 BPM

## 2024-02-02 DIAGNOSIS — E78.00 HIGH CHOLESTEROL: ICD-10-CM

## 2024-02-02 DIAGNOSIS — I65.23 BILATERAL CAROTID ARTERY STENOSIS: Primary | ICD-10-CM

## 2024-02-02 DIAGNOSIS — I10 PRIMARY HYPERTENSION: ICD-10-CM

## 2024-02-02 DIAGNOSIS — I48.91 ATRIAL FIBRILLATION, UNSPECIFIED TYPE: ICD-10-CM

## 2024-02-02 PROBLEM — I63.9 STROKE: Status: ACTIVE | Noted: 2024-02-02

## 2024-02-02 PROBLEM — E11.9 DIABETES MELLITUS: Status: ACTIVE | Noted: 2024-02-02

## 2024-02-02 PROCEDURE — 3078F DIAST BP <80 MM HG: CPT | Performed by: NURSE PRACTITIONER

## 2024-02-02 PROCEDURE — 3075F SYST BP GE 130 - 139MM HG: CPT | Performed by: NURSE PRACTITIONER

## 2024-02-02 PROCEDURE — 1160F RVW MEDS BY RX/DR IN RCRD: CPT | Performed by: NURSE PRACTITIONER

## 2024-02-02 PROCEDURE — 1159F MED LIST DOCD IN RCRD: CPT | Performed by: NURSE PRACTITIONER

## 2024-02-02 PROCEDURE — 99214 OFFICE O/P EST MOD 30 MIN: CPT | Performed by: NURSE PRACTITIONER

## 2024-02-12 ENCOUNTER — HOSPITAL ENCOUNTER (OUTPATIENT)
Dept: CT IMAGING | Age: 84
Discharge: HOME | End: 2024-02-12
Payer: MEDICARE

## 2024-02-12 DIAGNOSIS — C83.18 MANTLE CELL LYMPHOMA OF LYMPH NODES OF MULTIPLE REGIONS (HCC): ICD-10-CM

## 2024-02-12 PROCEDURE — 71260 CT THORAX DX C+: CPT

## 2024-02-12 PROCEDURE — 6360000004 HC RX CONTRAST MEDICATION: Performed by: INTERNAL MEDICINE

## 2024-02-12 RX ADMIN — IOPAMIDOL 75 ML: 755 INJECTION, SOLUTION INTRAVENOUS at 10:41

## 2024-02-19 ENCOUNTER — TELEPHONE (OUTPATIENT)
Dept: HEMATOLOGY | Age: 84
End: 2024-02-19

## 2024-02-19 DIAGNOSIS — C83.18 MANTLE CELL LYMPHOMA OF LYMPH NODES OF MULTIPLE REGIONS (HCC): Primary | ICD-10-CM

## 2024-02-19 DIAGNOSIS — R53.83 OTHER FATIGUE: ICD-10-CM

## 2024-02-19 NOTE — PROGRESS NOTES
MEDICAL ONCOLOGY PROGRESS NOTE    Pt Name: Erasto Meadows  MRN: [id number]  YOB: 1940  Date of evaluation: 2/21/2024    HISTORY OF PRESENT ILLNESS:  Reason for MD visit-surveillance follow-up of mantle cell lymphoma.  The patient has a diagnosis of mantle cell lymphoma in 2017. He received 6 cycles of bendamustine/rituximab followed by 2 years of rituximab maintenance completed December 2019.  He has been in remission.  The patient denies any B symptoms.  Denies any peripheral adenopathy.  Denies any abdominal pain.  Denies any early satiety.    Diagnosis  B-cell lymphoma, consistent with Mantle cell lymphoma, June 2017  CD20+  Translocation 11, 14  Cyclin D1 positive  SOX-11 positive    Treatment Summary  8/2/17 - 12/19/17 Completion Bendamustine/Rituxan x6 cycles  2/16/18 - 12/18/19 Completion Maintenance Rituxan every 2 months    Hematology/Cancer History  Erasto Meadows was first seen by me on 12/8/2021.  He presents to [facility] of care of a history of mantle cell lymphoma.  He was treated in [place], [place].  He received induction chemotherapy with bendamustine/rituximab followed by 2 years of maintenance rituximab.  12/16/16 US Abdominal ([facility], [place]):  20 cm x 9.7 cm splenomegaly  5/19/17 CT chest, abdomen, pelvis ([facility], [place]): Splenomegaly is similar to the previous exam. No evidence of adenopathy involving the chest abdomen or pelvis. Small nonspecific granular nodule the right upper lobe. Follow-up imaging to assess stability recommended.   6/26/17 Bone marrow ([facility] [place], [place]): Mildly hypercellular, 40%. CD20+ B-cell lymphoma consistent with mantle cell lymphoma. B-cell lymphoma involves 45%-50% of marrow cellularity. Trilineage hematopoiesis with adequate megakaryocytes. Iron increased (3+/4+). FLOW: CD20+, CD5+, monoclonal B-cell population. Monoclonal B-cell population is CD5+, CD19+, Bright  CD20+, Lambda+, CD23 dim and

## 2024-02-19 NOTE — TELEPHONE ENCOUNTER
Called pt. to remind them of appointment on 02/21/2024 and had to leave a detailed voicemail with appointment date and time.

## 2024-02-21 ENCOUNTER — OFFICE VISIT (OUTPATIENT)
Dept: HEMATOLOGY | Age: 84
End: 2024-02-21

## 2024-02-21 ENCOUNTER — HOSPITAL ENCOUNTER (OUTPATIENT)
Dept: INFUSION THERAPY | Age: 84
Discharge: HOME | End: 2024-02-21
Payer: MEDICARE

## 2024-02-21 VITALS
BODY MASS INDEX: 24.65 KG/M2 | OXYGEN SATURATION: 98 % | TEMPERATURE: 98.8 F | WEIGHT: 186 LBS | HEART RATE: 56 BPM | DIASTOLIC BLOOD PRESSURE: 62 MMHG | SYSTOLIC BLOOD PRESSURE: 126 MMHG | HEIGHT: 73 IN

## 2024-02-21 DIAGNOSIS — C83.18 MANTLE CELL LYMPHOMA OF LYMPH NODES OF MULTIPLE REGIONS (HCC): ICD-10-CM

## 2024-02-21 DIAGNOSIS — Z71.89 CARE PLAN DISCUSSED WITH PATIENT: ICD-10-CM

## 2024-02-21 DIAGNOSIS — R53.83 OTHER FATIGUE: ICD-10-CM

## 2024-02-21 DIAGNOSIS — C83.18 MANTLE CELL LYMPHOMA OF LYMPH NODES OF MULTIPLE REGIONS (HCC): Primary | ICD-10-CM

## 2024-02-21 DIAGNOSIS — D64.9 NORMOCYTIC ANEMIA: ICD-10-CM

## 2024-02-21 DIAGNOSIS — C85.90 NON-HODGKIN'S LYMPHOMA, UNSPECIFIED BODY REGION, UNSPECIFIED NON-HODGKIN LYMPHOMA TYPE (HCC): Primary | ICD-10-CM

## 2024-02-21 DIAGNOSIS — Z85.72 ENCOUNTER FOR FOLLOW-UP SURVEILLANCE OF LYMPHOMA: ICD-10-CM

## 2024-02-21 DIAGNOSIS — Z08 ENCOUNTER FOR FOLLOW-UP SURVEILLANCE OF LYMPHOMA: ICD-10-CM

## 2024-02-21 LAB
ALBUMIN SERPL-MCNC: 4.4 G/DL (ref 3.5–5.2)
ALP SERPL-CCNC: 142 U/L (ref 40–130)
ALT SERPL-CCNC: 60 U/L (ref 21–72)
ANION GAP SERPL CALCULATED.3IONS-SCNC: 9 MMOL/L (ref 7–19)
AST SERPL-CCNC: 43 U/L (ref 17–59)
BASOPHILS # BLD: 0.04 K/UL (ref 0.01–0.08)
BASOPHILS NFR BLD: 1 % (ref 0.1–1.2)
BILIRUB SERPL-MCNC: 0.5 MG/DL (ref 0.2–1.3)
BUN SERPL-MCNC: 19 MG/DL (ref 9–20)
CALCIUM SERPL-MCNC: 9.8 MG/DL (ref 8.4–10.2)
CHLORIDE SERPL-SCNC: 105 MMOL/L (ref 98–111)
CO2 SERPL-SCNC: 28 MMOL/L (ref 22–29)
CREAT SERPL-MCNC: 0.9 MG/DL (ref 0.6–1.2)
EOSINOPHIL # BLD: 0.14 K/UL (ref 0.04–0.54)
EOSINOPHIL NFR BLD: 3.4 % (ref 0.7–7)
ERYTHROCYTE [DISTWIDTH] IN BLOOD BY AUTOMATED COUNT: 13.6 % (ref 11.6–14.4)
GLOBULIN: 2.7 G/DL
GLUCOSE SERPL-MCNC: 138 MG/DL (ref 74–106)
HCT VFR BLD AUTO: 35.9 % (ref 40.1–51)
HGB BLD-MCNC: 12.3 G/DL (ref 13.7–17.5)
LYMPHOCYTES # BLD: 0.93 K/UL (ref 1.18–3.74)
LYMPHOCYTES NFR BLD: 22.5 % (ref 19.3–53.1)
MCH RBC QN AUTO: 31.1 PG (ref 25.7–32.2)
MCHC RBC AUTO-ENTMCNC: 34.3 G/DL (ref 32.3–36.5)
MCV RBC AUTO: 90.7 FL (ref 79–92.2)
MONOCYTES # BLD: 0.4 K/UL (ref 0.24–0.82)
MONOCYTES NFR BLD: 9.7 % (ref 4.7–12.5)
NEUTROPHILS # BLD: 2.61 K/UL (ref 1.56–6.13)
NEUTS SEG NFR BLD: 63.2 % (ref 34–71.1)
PLATELET # BLD AUTO: 149 K/UL (ref 163–337)
PMV BLD AUTO: 9.3 FL (ref 7.4–10.4)
POTASSIUM SERPL-SCNC: 3.9 MMOL/L (ref 3.5–5.1)
PROT SERPL-MCNC: 7.2 G/DL (ref 6.3–8.2)
RBC # BLD AUTO: 3.96 M/UL (ref 4.63–6.08)
SODIUM SERPL-SCNC: 142 MMOL/L (ref 137–145)
TSH SERPL DL<=0.005 MIU/L-ACNC: 6.47 UIU/ML (ref 0.27–4.2)
WBC # BLD AUTO: 4.13 K/UL (ref 4.23–9.07)

## 2024-02-21 PROCEDURE — 36415 COLL VENOUS BLD VENIPUNCTURE: CPT

## 2024-02-21 PROCEDURE — 6360000002 HC RX W HCPCS: Performed by: INTERNAL MEDICINE

## 2024-02-21 PROCEDURE — 85025 COMPLETE CBC W/AUTO DIFF WBC: CPT

## 2024-02-21 PROCEDURE — 99212 OFFICE O/P EST SF 10 MIN: CPT

## 2024-02-21 PROCEDURE — 2580000003 HC RX 258: Performed by: INTERNAL MEDICINE

## 2024-02-21 PROCEDURE — 96523 IRRIG DRUG DELIVERY DEVICE: CPT

## 2024-02-21 PROCEDURE — 80053 COMPREHEN METABOLIC PANEL: CPT

## 2024-02-21 RX ORDER — KETOCONAZOLE 20 MG/G
CREAM TOPICAL DAILY
COMMUNITY

## 2024-02-21 RX ORDER — OXYMETAZOLINE HYDROCHLORIDE 0.05 G/100ML
2 SPRAY NASAL 2 TIMES DAILY
COMMUNITY

## 2024-02-21 RX ORDER — WATER 10 ML/10ML
2.2 INJECTION INTRAMUSCULAR; INTRAVENOUS; SUBCUTANEOUS ONCE
OUTPATIENT
Start: 2024-02-21 | End: 2024-02-21

## 2024-02-21 RX ORDER — SODIUM CHLORIDE 0.9 % (FLUSH) 0.9 %
5-40 SYRINGE (ML) INJECTION PRN
OUTPATIENT
Start: 2024-02-21

## 2024-02-21 RX ORDER — SODIUM CHLORIDE 0.9 % (FLUSH) 0.9 %
5-40 SYRINGE (ML) INJECTION PRN
Status: DISCONTINUED | OUTPATIENT
Start: 2024-02-21 | End: 2024-02-22 | Stop reason: HOSPADM

## 2024-02-21 RX ORDER — HEPARIN 100 UNIT/ML
500 SYRINGE INTRAVENOUS PRN
OUTPATIENT
Start: 2024-02-21

## 2024-02-21 RX ORDER — HEPARIN 100 UNIT/ML
500 SYRINGE INTRAVENOUS PRN
Status: DISCONTINUED | OUTPATIENT
Start: 2024-02-21 | End: 2024-02-22 | Stop reason: HOSPADM

## 2024-02-21 RX ORDER — SODIUM CHLORIDE 9 MG/ML
25 INJECTION, SOLUTION INTRAVENOUS PRN
OUTPATIENT
Start: 2024-02-21

## 2024-02-21 RX ADMIN — SODIUM CHLORIDE, PRESERVATIVE FREE 10 ML: 5 INJECTION INTRAVENOUS at 09:15

## 2024-02-21 RX ADMIN — HEPARIN 500 UNITS: 100 SYRINGE at 09:15

## 2024-04-17 ENCOUNTER — HOSPITAL ENCOUNTER (OUTPATIENT)
Dept: INFUSION THERAPY | Age: 84
Discharge: HOME | End: 2024-04-17
Payer: MEDICARE

## 2024-04-17 DIAGNOSIS — C85.90 NON-HODGKIN'S LYMPHOMA, UNSPECIFIED BODY REGION, UNSPECIFIED NON-HODGKIN LYMPHOMA TYPE (HCC): Primary | ICD-10-CM

## 2024-04-17 PROCEDURE — 2580000003 HC RX 258: Performed by: INTERNAL MEDICINE

## 2024-04-17 PROCEDURE — 96523 IRRIG DRUG DELIVERY DEVICE: CPT

## 2024-04-17 PROCEDURE — 6360000002 HC RX W HCPCS: Performed by: INTERNAL MEDICINE

## 2024-04-17 RX ORDER — WATER 10 ML/10ML
2.2 INJECTION INTRAMUSCULAR; INTRAVENOUS; SUBCUTANEOUS ONCE
OUTPATIENT
Start: 2024-04-17 | End: 2024-04-17

## 2024-04-17 RX ORDER — HEPARIN 100 UNIT/ML
500 SYRINGE INTRAVENOUS PRN
OUTPATIENT
Start: 2024-04-17

## 2024-04-17 RX ORDER — SODIUM CHLORIDE 0.9 % (FLUSH) 0.9 %
5-40 SYRINGE (ML) INJECTION PRN
Status: DISCONTINUED | OUTPATIENT
Start: 2024-04-17 | End: 2024-04-18 | Stop reason: HOSPADM

## 2024-04-17 RX ORDER — SODIUM CHLORIDE 0.9 % (FLUSH) 0.9 %
5-40 SYRINGE (ML) INJECTION PRN
OUTPATIENT
Start: 2024-04-17

## 2024-04-17 RX ORDER — HEPARIN 100 UNIT/ML
500 SYRINGE INTRAVENOUS PRN
Status: DISCONTINUED | OUTPATIENT
Start: 2024-04-17 | End: 2024-04-18 | Stop reason: HOSPADM

## 2024-04-17 RX ORDER — SODIUM CHLORIDE 9 MG/ML
25 INJECTION, SOLUTION INTRAVENOUS PRN
OUTPATIENT
Start: 2024-04-17

## 2024-04-17 RX ADMIN — HEPARIN 500 UNITS: 100 SYRINGE at 08:29

## 2024-04-17 RX ADMIN — SODIUM CHLORIDE, PRESERVATIVE FREE 10 ML: 5 INJECTION INTRAVENOUS at 08:28

## 2024-05-02 ENCOUNTER — TELEPHONE (OUTPATIENT)
Dept: CARDIOLOGY CLINIC | Age: 84
End: 2024-05-02

## 2024-05-02 NOTE — TELEPHONE ENCOUNTER
I called patients number and left a voice message to reschedule their July appointment as Dr. Enriquez will not be in office that week.

## 2024-06-12 ENCOUNTER — HOSPITAL ENCOUNTER (OUTPATIENT)
Dept: INFUSION THERAPY | Age: 84
Discharge: HOME | End: 2024-06-12
Payer: MEDICARE

## 2024-06-12 DIAGNOSIS — C85.90 NON-HODGKIN'S LYMPHOMA, UNSPECIFIED BODY REGION, UNSPECIFIED NON-HODGKIN LYMPHOMA TYPE (HCC): Primary | ICD-10-CM

## 2024-06-12 PROCEDURE — 6360000002 HC RX W HCPCS: Performed by: INTERNAL MEDICINE

## 2024-06-12 PROCEDURE — 2580000003 HC RX 258: Performed by: INTERNAL MEDICINE

## 2024-06-12 PROCEDURE — 96523 IRRIG DRUG DELIVERY DEVICE: CPT

## 2024-06-12 RX ORDER — SODIUM CHLORIDE 0.9 % (FLUSH) 0.9 %
5-40 SYRINGE (ML) INJECTION PRN
Status: DISCONTINUED | OUTPATIENT
Start: 2024-06-12 | End: 2024-06-13 | Stop reason: HOSPADM

## 2024-06-12 RX ORDER — HEPARIN 100 UNIT/ML
500 SYRINGE INTRAVENOUS PRN
OUTPATIENT
Start: 2024-06-12

## 2024-06-12 RX ORDER — WATER 10 ML/10ML
2.2 INJECTION INTRAMUSCULAR; INTRAVENOUS; SUBCUTANEOUS ONCE
OUTPATIENT
Start: 2024-06-12 | End: 2024-06-12

## 2024-06-12 RX ORDER — HEPARIN 100 UNIT/ML
500 SYRINGE INTRAVENOUS PRN
Status: DISCONTINUED | OUTPATIENT
Start: 2024-06-12 | End: 2024-06-13 | Stop reason: HOSPADM

## 2024-06-12 RX ORDER — SODIUM CHLORIDE 9 MG/ML
25 INJECTION, SOLUTION INTRAVENOUS PRN
OUTPATIENT
Start: 2024-06-12

## 2024-06-12 RX ORDER — SODIUM CHLORIDE 0.9 % (FLUSH) 0.9 %
5-40 SYRINGE (ML) INJECTION PRN
OUTPATIENT
Start: 2024-06-12

## 2024-06-12 RX ADMIN — HEPARIN 500 UNITS: 100 SYRINGE at 08:26

## 2024-06-12 RX ADMIN — SODIUM CHLORIDE, PRESERVATIVE FREE 10 ML: 5 INJECTION INTRAVENOUS at 08:26

## 2024-07-12 RX ORDER — AMIODARONE HYDROCHLORIDE 200 MG/1
200 TABLET ORAL DAILY
Qty: 90 TABLET | Refills: 1 | Status: SHIPPED | OUTPATIENT
Start: 2024-07-12

## 2024-08-01 ENCOUNTER — TELEPHONE (OUTPATIENT)
Dept: NEUROLOGY | Facility: CLINIC | Age: 84
End: 2024-08-01
Payer: MEDICARE

## 2024-08-01 NOTE — TELEPHONE ENCOUNTER
CALLED PATIENT TO LET THEM KNOW THAT KYRIE SCHEDULE HAS HAD TO BE CHANGED FROM THURSDAY/FRIDAY TO TUESDAY/WEDNESDAY SO I WOULD NEED TO MOVE THEIR APPOINTMENT THEY HAVE NEXT WEEK TO A DIFFERENT DAY. PATIENT DID NOT ANSWER, LEFT A DETAILED VOICEMAIL WITH A REQUEST FOR A RETURN CALL.

## 2024-08-01 NOTE — TELEPHONE ENCOUNTER
Caller: Juan Carlos Marsh    Relationship: Self    Best call back number: [phone]    What is the best time to reach you: ANY    Who are you requesting to speak with (clinical staff, provider,  specific staff member): JEANNE    Do you know the name of the person who called: JEANNE    What was the call regarding: APPT - HUB UNABLE TO RESCHEDULE OR WARM TRANSFER.

## 2024-08-06 ENCOUNTER — OFFICE VISIT (OUTPATIENT)
Dept: NEUROLOGY | Facility: CLINIC | Age: 84
End: 2024-08-06
Payer: MEDICARE

## 2024-08-06 VITALS
OXYGEN SATURATION: 97 % | SYSTOLIC BLOOD PRESSURE: 116 MMHG | WEIGHT: 182 LBS | BODY MASS INDEX: 24.65 KG/M2 | HEIGHT: 72 IN | DIASTOLIC BLOOD PRESSURE: 58 MMHG | HEART RATE: 55 BPM

## 2024-08-06 DIAGNOSIS — I65.21 STENOSIS OF RIGHT CAROTID ARTERY: ICD-10-CM

## 2024-08-06 DIAGNOSIS — I69.30 HISTORY OF STROKE WITH RESIDUAL DEFICIT: Primary | ICD-10-CM

## 2024-08-06 DIAGNOSIS — I48.91 ATRIAL FIBRILLATION, UNSPECIFIED TYPE: ICD-10-CM

## 2024-08-06 DIAGNOSIS — I10 HYPERTENSION, UNSPECIFIED TYPE: ICD-10-CM

## 2024-08-06 DIAGNOSIS — H53.461 HOMONYMOUS HEMIANOPIA, RIGHT: ICD-10-CM

## 2024-08-06 DIAGNOSIS — E78.5 HYPERLIPIDEMIA, UNSPECIFIED HYPERLIPIDEMIA TYPE: ICD-10-CM

## 2024-08-06 PROCEDURE — 1159F MED LIST DOCD IN RCRD: CPT | Performed by: NURSE PRACTITIONER

## 2024-08-06 PROCEDURE — 1160F RVW MEDS BY RX/DR IN RCRD: CPT | Performed by: NURSE PRACTITIONER

## 2024-08-06 PROCEDURE — 99213 OFFICE O/P EST LOW 20 MIN: CPT | Performed by: NURSE PRACTITIONER

## 2024-08-06 PROCEDURE — 3074F SYST BP LT 130 MM HG: CPT | Performed by: NURSE PRACTITIONER

## 2024-08-06 PROCEDURE — 3078F DIAST BP <80 MM HG: CPT | Performed by: NURSE PRACTITIONER

## 2024-08-06 NOTE — PROGRESS NOTES
Neurology Progress Note    Chief Complaint:    Vision Change  Right Homonymous Hemianopsia  Stroke    Subjective   History of Present Illness:  Juan Carlos Marsh is a 84 y.o. male who presents today for vision change of right homonymous Hemianopsia.  He is routinely followed by Ridge Tomas MD for primary care.     Stroke/Homonymous Hemianopsia  He denies any new symptoms of stroke.  He does remain He remains on Eliquis 5mg BID and Crestor 40mg daily.  He is doing well without any new concerns.  He does continue with homonymous hemianopsia that hasn't changed.  He continues with Vascular who is monitoring his carotid artery stenosis.  BP is controlled at this time. He denies any issues with his A1C. He has recently had labs performed with his PCP that he notes were the best he's had.       Allergies:    Patient has no known allergies.    Medications:  Current Outpatient Medications   Medication Sig Dispense Refill    amiodarone (PACERONE) 200 MG tablet Take 1 tablet by mouth Daily.      amLODIPine (NORVASC) 10 MG tablet Take 1 tablet by mouth Daily.      azelastine (ASTELIN) 0.1 % nasal spray 2 sprays into the nostril(s) as directed by provider 2 (Two) Times a Day. Use in each nostril as directed      cholecalciferol (VITAMIN D3) 25 MCG (1000 UT) tablet Take 2 tablets by mouth Every Night.      Contour Next Test test strip 1 each by Other route Daily.      [facility] CINNAMON PO Take 1,000 mg by mouth Daily.      Eliquis 5 MG tablet tablet Take 1 tablet by mouth 2 (Two) Times a Day.      famotidine (Pepcid) 20 MG tablet Take 1 tablet by mouth 2 (Two) Times a Day. 60 tablet 3    fluticasone (FLONASE) 50 MCG/ACT nasal spray 2 sprays into the nostril(s) as directed by provider Daily.      ipratropium (ATROVENT) 0.06 % nasal spray 2 sprays into the nostril(s) as directed by provider 3 (Three) Times a Day. 45 mL 3    ketoconazole (NIZORAL) 2 % cream APPLY CREAM TWICE A DAY TO AFFECTED AREAS WHEN AND WHERE RASH IS PRESENT.       levothyroxine (SYNTHROID, LEVOTHROID) 75 MCG tablet take 1 tablet by mouth every day in the morning on empty stomach      Magnesium 250 MG tablet Take 1 tablet by mouth Daily.      metFORMIN (GLUCOPHAGE) 500 MG tablet take 1 tablet by mouth every day with meals      olmesartan (BENICAR) 40 MG tablet olmesartan 40 mg tablet   TAKE 1 TABLET BY MOUTH ONCE DAILY      rosuvastatin (CRESTOR) 40 MG tablet Take 1 tablet by mouth Daily.      tamsulosin (FLOMAX) 0.4 MG capsule 24 hr capsule Take 1 capsule by mouth Daily.      vitamin B-12 (CYANOCOBALAMIN) 1000 MCG tablet Take 1 tablet by mouth Daily.       No current facility-administered medications for this visit.     Current outpatient and discharge medications have been reconciled for the patient.  Reviewed by: SRAVAN Milan    Past Medical History:  Past Medical History:   Diagnosis Date    Atrial fibrillation     Cancer     Coronary artery disease     Diabetes mellitus     Disease of thyroid gland     Dizziness     High blood pressure     High cholesterol     HL (hearing loss)     Memory loss 05/2020    Stroke    Nosebleed     Sinusitis     Stroke     Vision loss      Past Surgical History:   Procedure Laterality Date    APPENDECTOMY  2023    EXCISION LESION Right 06/29/2022    Procedure: Excision of basal cell carcinoma of the right jawline with complex closure;  Surgeon: Ky Godoy MD;  Location: [facility] [place] OR;  Service: ENT;  Laterality: Right;    EYE SURGERY Bilateral     HEEL SPUR SURGERY      HERNIA REPAIR      PORTACATH PLACEMENT Right     REPLACEMENT TOTAL KNEE Left     SHOULDER SURGERY Right     SKIN LESION EXCISION      TONSILLECTOMY       Family History   Problem Relation Age of Onset    Stroke Father         Passed at age 88    Cancer Brother         Twin Brother     Social History     Tobacco Use    Smoking status: Former     Current packs/day: 0.00     Average packs/day: 0.5 packs/day for 18.0 years (9.0 ttl pk-yrs)     Types:  "Cigarettes, Pipe, Cigars     Start date: 1958     Quit date: 1976     Years since quittin.4     Passive exposure: Never    Smokeless tobacco: Never    Tobacco comments:     A crazy decision, entered    Vaping Use    Vaping status: Never Used   Substance Use Topics    Alcohol use: Not Currently    Drug use: Never     Review of Systems   Eyes:  Positive for visual disturbance.         Objective   Vital Signs:  Heart Rate:  [55] 55  BP: (116)/(58) 116/58      24  1105   Weight: 82.6 kg (182 lb)     182.9 cm (72\")  Body mass index is 24.68 kg/m².    Physical Exam  Vitals reviewed.   Constitutional:       Appearance: Normal appearance.   HENT:      Head: Normocephalic.      Mouth/Throat:      Pharynx: Oropharynx is clear.   Eyes:      General: Lids are normal.      Extraocular Movements: Extraocular movements intact.      Pupils: Pupils are equal, round, and reactive to light.   Cardiovascular:      Rate and Rhythm: Normal rate and regular rhythm.      Pulses: Normal pulses.   Pulmonary:      Effort: Pulmonary effort is normal.   Musculoskeletal:         General: Normal range of motion.      Cervical back: Normal range of motion and neck supple.   Skin:     General: Skin is warm and dry.      Capillary Refill: Capillary refill takes less than 2 seconds.   Neurological:      Motor: Motor strength is normal.     Coordination: Coordination is intact.      Deep Tendon Reflexes: Reflexes are normal and symmetric.   Psychiatric:         Mood and Affect: Mood normal.         Speech: Speech normal.       Neurological Exam  Mental Status  Awake, alert and oriented to person, place and time. Recent and remote memory are intact. Speech is normal. Language is fluent with no aphasia. Attention and concentration are normal.    Cranial Nerves  CN II: Visual acuity is normal. Right homonymous hemianopsia.  CN III, IV, VI: Extraocular movements intact bilaterally. Normal lids and orbits bilaterally. Pupils " "equal round and reactive to light bilaterally.  CN V: Facial sensation is normal.  CN VII: Full and symmetric facial movement.  CN IX, X: Palate elevates symmetrically. Normal gag reflex.  CN XI: Shoulder shrug strength is normal.  CN XII: Tongue midline without atrophy or fasciculations.    Motor   Strength is 5/5 throughout all four extremities.    Sensory  Sensation is intact to light touch, pinprick, vibration and proprioception in all four extremities.    Reflexes  Deep tendon reflexes are 2+ and symmetric in all four extremities.    Coordination    Finger-to-nose, rapid alternating movements and heel-to-shin normal bilaterally without dysmetria.    Gait  Normal casual, toe, heel and tandem gait.    Results Review:    Lab Results   Component Value Date    GLUCOSE 169 (H) 06/27/2022    BUN 15 06/27/2022    CREATININE 1.30 10/18/2023    EGFRIFNONA >60 04/08/2022    EGFRIFAFRI >60 04/08/2022    BCR 16.9 06/27/2022    K 4.1 06/27/2022    CO2 29.0 06/27/2022    CALCIUM 9.6 06/27/2022    ALBUMIN 4.10 06/27/2022    AST 33 06/27/2022    ALT 47 (H) 06/27/2022     Lab Results   Component Value Date    WBC 4.13 (L) 02/21/2024    HGB 12.3 (L) 02/21/2024    HCT 35.9 (L) 02/21/2024    MCV 90.7 02/21/2024     (L) 02/21/2024     Lab Results   Component Value Date    CHLPL 129 (L) 08/09/2021    TRIG 84 08/09/2021    HDL 48 (L) 08/09/2021    LDL 64 08/09/2021     Lab Results   Component Value Date    TSH 6.470 (H) 02/21/2024     Lab Results   Component Value Date    HGBA1C 5.5 08/09/2021     No results found for: \"FOLATE\"  No results found for: \"[id number]\"    CT Angiogram Head (10/18/2023 14:40)  CT Angiogram Neck (10/18/2023 14:40)    Chart Review:  SUMMARY OF CARE - SCAN - [facility]-10.20.23 (10/20/2023)  PROGRESS NOTES - SCAN - PROG NOTE_[facility]_10/18/23 (10/18/2023)     Plan   Diagnoses and all orders for this visit:    1. History of stroke with residual deficit (Primary)  2. " Homonymous hemianopia, right  Denies any new symptoms.  He continues secondary prevention at this time without complaints.  He indicates his health is best spending quite some time.  At this point see no other further changes to make with his care and continue to recommend secondary stroke prevention as detailed below.  I continue to recommend no driving due to his severe homonymous hemianopsia.    - Continue Eliquis 5 mg twice daily  - Continue Crestor 40 mg daily  - LDL goal less than 70  - A1c goal less than 6.5%  - Szell blood pressure goal less than 140  - Recommend no driving    3. Stenosis of right carotid artery  At this point he is following with vascular surgery with conservative measures.  I continue to recommend him following with vascular surgery at this time    - Continue with vascular surgery    4. Atrial fibrillation, unspecified type  Atrial fibrillation as a potential etiology to his stroke and I continue to recommend anticoagulation for prevention of stroke.    - Continue Eliquis 5 mg twice daily  - Continue with PCP/cardiology for management of atrial fibrillation    5. Hypertension, unspecified type  6. Hyperlipidemia, unspecified hyperlipidemia type  Continue with secondary stroke prevention measures to help prevent stroke at this time.    - Systolic blood pressure goal less than 140  - LDL goal less than 70  - Continue Crestor    At this point has been doing well for quite some time.  I continue recommend secondary stroke prevention but he does not need to continue to follow with neurology on a routine basis.  He can follow with PCP at this time.  Had a discussion with him regarding this and he states understanding.  He will continue to follow with his PCP at this time.  He will call the office with any new concerns.      Follow-Up:  Return if symptoms worsen or fail to improve.         Jorge A Agosto, SRAVAN  08/06/24  12:41 CDT

## 2024-08-09 ENCOUNTER — TELEPHONE (OUTPATIENT)
Dept: VASCULAR SURGERY | Facility: CLINIC | Age: 84
End: 2024-08-09
Payer: MEDICARE

## 2024-08-12 ENCOUNTER — HOSPITAL ENCOUNTER (OUTPATIENT)
Dept: ULTRASOUND IMAGING | Facility: HOSPITAL | Age: 84
Discharge: HOME | End: 2024-08-12
Admitting: NURSE PRACTITIONER
Payer: MEDICARE

## 2024-08-12 ENCOUNTER — OFFICE VISIT (OUTPATIENT)
Dept: VASCULAR SURGERY | Facility: CLINIC | Age: 84
End: 2024-08-12
Payer: MEDICARE

## 2024-08-12 VITALS
BODY MASS INDEX: 24.11 KG/M2 | HEART RATE: 82 BPM | HEIGHT: 72 IN | DIASTOLIC BLOOD PRESSURE: 62 MMHG | OXYGEN SATURATION: 96 % | WEIGHT: 178 LBS | SYSTOLIC BLOOD PRESSURE: 128 MMHG

## 2024-08-12 DIAGNOSIS — I10 PRIMARY HYPERTENSION: ICD-10-CM

## 2024-08-12 DIAGNOSIS — E78.00 HIGH CHOLESTEROL: ICD-10-CM

## 2024-08-12 DIAGNOSIS — I65.23 BILATERAL CAROTID ARTERY STENOSIS: Primary | ICD-10-CM

## 2024-08-12 DIAGNOSIS — I65.23 BILATERAL CAROTID ARTERY STENOSIS: ICD-10-CM

## 2024-08-12 DIAGNOSIS — I48.91 ATRIAL FIBRILLATION, UNSPECIFIED TYPE: ICD-10-CM

## 2024-08-12 PROCEDURE — 3074F SYST BP LT 130 MM HG: CPT | Performed by: NURSE PRACTITIONER

## 2024-08-12 PROCEDURE — 1160F RVW MEDS BY RX/DR IN RCRD: CPT | Performed by: NURSE PRACTITIONER

## 2024-08-12 PROCEDURE — 93880 EXTRACRANIAL BILAT STUDY: CPT | Performed by: SURGERY

## 2024-08-12 PROCEDURE — 1159F MED LIST DOCD IN RCRD: CPT | Performed by: NURSE PRACTITIONER

## 2024-08-12 PROCEDURE — 99214 OFFICE O/P EST MOD 30 MIN: CPT | Performed by: NURSE PRACTITIONER

## 2024-08-12 PROCEDURE — 3078F DIAST BP <80 MM HG: CPT | Performed by: NURSE PRACTITIONER

## 2024-08-12 PROCEDURE — 93880 EXTRACRANIAL BILAT STUDY: CPT

## 2024-08-12 RX ORDER — MULTIPLE VITAMINS W/ MINERALS TAB 9MG-400MCG
1 TAB ORAL DAILY
COMMUNITY

## 2024-08-12 NOTE — LETTER
August 12, 2024     Ridge Tomas MD  [address] [facility] [address]    Patient: Juan Carlos Marsh   YOB: 1940   Date of Visit: 8/12/2024     Dear Ridge Tomas MD:       Thank you for referring Juan Carlos Marsh to me for evaluation. Below are the relevant portions of my assessment and plan of care.    If you have questions, please do not hesitate to call me. I look forward to following Juan Carlos along with you.         Sincerely,        Grace SRAVAN Locke        CC: No Recipients    Grace Locke APRN  08/12/24 [id number]  Sign when Signing Visit  8/12/2024       Ridge Tomas MD  [address] [facility] [place] [address]      Juan Carlos Marsh  1940    Chief Complaint   Patient presents with   • Bilateral carotid artery stenosis     Testing this am, no stroke symptoms and no complaints       Dear Ridge Tomas MD       HPI  I had the pleasure of seeing your patient Juan Carlos Marsh in the office today.   As you recall, Juan Carlos Marsh is a 84 y.o.  male who you are currently following for stroke and visual changes.  Per records he previously had to stop his Eliquis for a procedure back in October which is when he notes the onset of his visual disturbance.  He does have atrial fibrillation.  He denies any unilateral weakness, numbness or tingling or speech difficulties.  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  He is doing well and denies any changes since he was last seen.  He is maintained on Eliquis and Crestor.  He did have noninvasive testing performed today, which I did review in office.      Review of Systems   Constitutional: Negative.    HENT: Negative.     Eyes:  Positive for visual disturbance (Right eye).   Respiratory: Negative.     Cardiovascular: Negative.    Gastrointestinal: Negative.    Endocrine: Negative.    Genitourinary: Negative.    Musculoskeletal: Negative.    Skin: Negative.   "  Allergic/Immunologic: Negative.    Neurological: Negative.    Hematological: Negative.    Psychiatric/Behavioral: Negative.     All other systems reviewed and are negative.      /62   Pulse 82   Ht 182.9 cm (72\")   Wt 80.7 kg (178 lb)   SpO2 96%   BMI 24.14 kg/m²   Physical Exam  Vitals and nursing note reviewed.   Constitutional:       Appearance: Normal appearance. He is well-developed and normal weight.   HENT:      Head: Normocephalic and atraumatic.   Eyes:      General: Visual field deficit (Right) present. No scleral icterus.     Pupils: Pupils are equal, round, and reactive to light.   Neck:      Thyroid: No thyromegaly.   Cardiovascular:      Rate and Rhythm: Normal rate and regular rhythm.      Heart sounds: Normal heart sounds.   Pulmonary:      Effort: Pulmonary effort is normal.      Breath sounds: Normal breath sounds.   Abdominal:      General: Bowel sounds are normal.      Palpations: Abdomen is soft.   Musculoskeletal:         General: Normal range of motion.      Cervical back: Normal range of motion and neck supple.   Skin:     General: Skin is warm and dry.   Neurological:      General: No focal deficit present.      Mental Status: He is alert and oriented to person, place, and time.   Psychiatric:         Mood and Affect: Mood normal.         Behavior: Behavior normal.         Thought Content: Thought content normal.         Judgment: Judgment normal.       US Carotid Bilateral    Result Date: 8/12/2024  Narrative: History: Carotid occlusive disease      Impression: Impression: 1. There is less than 50% stenosis of the right internal carotid artery. 2. There is less than 50% stenosis of the left internal carotid artery. 3. Antegrade flow is demonstrated in the right vertebral. 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  Comments: Bilateral carotid vertebral arterial duplex scan was performed.  Grayscale imaging shows intimal thickening and calcified elements at the carotid " bifurcation. The right internal carotid artery peak systolic velocity is 79.1 cm/sec. The end-diastolic velocity is 13.4 cm/sec. The right ICA/CCA ratio is approximately 1.3. These findings correlate with less than 50% stenosis of the right internal carotid artery.  Grayscale imaging shows intimal thickening and calcified elements at the carotid bifurcation. The left internal carotid artery peak systolic velocity is 82.6 cm/sec. The end-diastolic velocity is 16.8 cm/sec. The left ICA/CCA ratio is approximately 1.5. These findings correlate with less than 50% stenosis of the left internal carotid artery.    This report was signed and finalized on 8/12/2024 9:45 AM by Dr. rDew Rivera MD.        Patient Active Problem List   Diagnosis   • Neoplasm of uncertain behavior   • Basal cell carcinoma   • Atrial fibrillation   • Diabetes mellitus   • High blood pressure   • Hyperlipidemia   • Stroke         ICD-10-CM ICD-9-CM   1. Bilateral carotid artery stenosis  I65.23 433.10     433.30   2. Primary hypertension  I10 401.9   3. High cholesterol  E78.00 272.0   4. Atrial fibrillation, unspecified type  I48.91 427.31             Plan: After thoroughly evaluating Juan Carlos Marsh, I believe the best course of action is to remain conservative from vascular surgery standpoint.  Currently he is doing well and denies any strokelike symptoms.  I did review his testing which shows less than 50% carotid stenosis bilaterally.  His previous CTA showed about 60% right carotid stenosis.  We will see him back in 1 year with repeat noninvasive testing including a carotid duplex for continued surveillance.  I did discuss vascular risk factors as they pertain to the progression of vascular disease including controlling his hypertension and cholesterol.  His blood pressure stable on his current medication.  He should continue his Eliquis 5 mg twice daily and Crestor 40 mg daily in addition to his other medications.  This was all discussed in  full with complete understanding.    Thank you for allowing me to participate in the care of your patient.  Please do not hesitate with any questions or concerns.  I will keep you aware of any further encounters with Juan Carlos Marsh.        Sincerely yours,         SRAVAN Pina

## 2024-08-12 NOTE — PROGRESS NOTES
"8/12/2024       Ridge Tomas MD  [address] [place] [address]      Juan Carlos Marsh  1940    Chief Complaint   Patient presents with    Bilateral carotid artery stenosis     Testing this am, no stroke symptoms and no complaints       Dear Ridge Tomas MD       HPI  I had the pleasure of seeing your patient Juan Carlos Marsh in the office today.   As you recall, Juan Carlos Marsh is a 84 y.o.  male who you are currently following for stroke and visual changes.  Per records he previously had to stop his Eliquis for a procedure back in October which is when he notes the onset of his visual disturbance.  He does have atrial fibrillation.  He denies any unilateral weakness, numbness or tingling or speech difficulties.  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  He is doing well and denies any changes since he was last seen.  He is maintained on Eliquis and Crestor.  He did have noninvasive testing performed today, which I did review in office.      Review of Systems   Constitutional: Negative.    HENT: Negative.     Eyes:  Positive for visual disturbance (Right eye).   Respiratory: Negative.     Cardiovascular: Negative.    Gastrointestinal: Negative.    Endocrine: Negative.    Genitourinary: Negative.    Musculoskeletal: Negative.    Skin: Negative.    Allergic/Immunologic: Negative.    Neurological: Negative.    Hematological: Negative.    Psychiatric/Behavioral: Negative.     All other systems reviewed and are negative.      /62   Pulse 82   Ht 182.9 cm (72\")   Wt 80.7 kg (178 lb)   SpO2 96%   BMI 24.14 kg/m²   Physical Exam  Vitals and nursing note reviewed.   Constitutional:       Appearance: Normal appearance. He is well-developed and normal weight.   HENT:      Head: Normocephalic and atraumatic.   Eyes:      General: Visual field deficit (Right) present. No scleral icterus.     Pupils: Pupils are " equal, round, and reactive to light.   Neck:      Thyroid: No thyromegaly.   Cardiovascular:      Rate and Rhythm: Normal rate and regular rhythm.      Heart sounds: Normal heart sounds.   Pulmonary:      Effort: Pulmonary effort is normal.      Breath sounds: Normal breath sounds.   Abdominal:      General: Bowel sounds are normal.      Palpations: Abdomen is soft.   Musculoskeletal:         General: Normal range of motion.      Cervical back: Normal range of motion and neck supple.   Skin:     General: Skin is warm and dry.   Neurological:      General: No focal deficit present.      Mental Status: He is alert and oriented to person, place, and time.   Psychiatric:         Mood and Affect: Mood normal.         Behavior: Behavior normal.         Thought Content: Thought content normal.         Judgment: Judgment normal.       US Carotid Bilateral    Result Date: 8/12/2024  Narrative: History: Carotid occlusive disease      Impression: Impression: 1. There is less than 50% stenosis of the right internal carotid artery. 2. There is less than 50% stenosis of the left internal carotid artery. 3. Antegrade flow is demonstrated in the right vertebral. 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  Comments: Bilateral carotid vertebral arterial duplex scan was performed.  Grayscale imaging shows intimal thickening and calcified elements at the carotid bifurcation. The right internal carotid artery peak systolic velocity is 79.1 cm/sec. The end-diastolic velocity is 13.4 cm/sec. The right ICA/CCA ratio is approximately 1.3. These findings correlate with less than 50% stenosis of the right internal carotid artery.  Grayscale imaging shows intimal thickening and calcified elements at the carotid bifurcation. The left internal carotid artery peak systolic velocity is 82.6 cm/sec. The end-diastolic velocity is 16.8 cm/sec. The left ICA/CCA ratio is approximately 1.5. These findings correlate with less than 50% stenosis of the left  internal carotid artery.    This report was signed and finalized on 8/12/2024 9:45 AM by Dr. Drew Rivera MD.        Patient Active Problem List   Diagnosis    Neoplasm of uncertain behavior    Basal cell carcinoma    Atrial fibrillation    Diabetes mellitus    High blood pressure    Hyperlipidemia    Stroke         ICD-10-CM ICD-9-CM   1. Bilateral carotid artery stenosis  I65.23 433.10     433.30   2. Primary hypertension  I10 401.9   3. High cholesterol  E78.00 272.0   4. Atrial fibrillation, unspecified type  I48.91 427.31             Plan: After thoroughly evaluating Juan Carlos Marsh, I believe the best course of action is to remain conservative from vascular surgery standpoint.  Currently he is doing well and denies any strokelike symptoms.  I did review his testing which shows less than 50% carotid stenosis bilaterally.  His previous CTA showed about 60% right carotid stenosis.  We will see him back in 1 year with repeat noninvasive testing including a carotid duplex for continued surveillance.  I did discuss vascular risk factors as they pertain to the progression of vascular disease including controlling his hypertension and cholesterol.  His blood pressure stable on his current medication.  He should continue his Eliquis 5 mg twice daily and Crestor 40 mg daily in addition to his other medications.  This was all discussed in full with complete understanding.    Thank you for allowing me to participate in the care of your patient.  Please do not hesitate with any questions or concerns.  I will keep you aware of any further encounters with Juan Carlos Marsh.        Sincerely yours,         SRAVAN Pina

## 2024-08-21 ENCOUNTER — HOSPITAL ENCOUNTER (OUTPATIENT)
Dept: CT IMAGING | Age: 84
Discharge: HOME | End: 2024-08-21
Payer: MEDICARE

## 2024-08-21 DIAGNOSIS — C83.18 MANTLE CELL LYMPHOMA OF LYMPH NODES OF MULTIPLE REGIONS (HCC): ICD-10-CM

## 2024-08-21 LAB — CREAT SERPL-MCNC: 1.4 MG/DL (ref 0.3–1.3)

## 2024-08-21 PROCEDURE — 74177 CT ABD & PELVIS W/CONTRAST: CPT

## 2024-08-21 PROCEDURE — 82565 ASSAY OF CREATININE: CPT

## 2024-08-21 PROCEDURE — 71260 CT THORAX DX C+: CPT

## 2024-08-21 PROCEDURE — 6360000004 HC RX CONTRAST MEDICATION: Performed by: INTERNAL MEDICINE

## 2024-08-21 RX ADMIN — IOPAMIDOL 75 ML: 755 INJECTION, SOLUTION INTRAVENOUS at 08:26

## 2024-09-04 ENCOUNTER — TELEPHONE (OUTPATIENT)
Dept: HEMATOLOGY | Age: 84
End: 2024-09-04

## 2024-09-04 NOTE — TELEPHONE ENCOUNTER
Called patient today for their upcoming appointment on 09/06/2024 and patient needed to be rescheduled. Patient's name and number was taken and given to  staff ( sent to  via teams    )  so that the patient could be rescheduled to a better date & time for the patient. We have now moved to the [facility] [email or web address]] that is located between our old office and the ER at the [facility]

## 2024-09-09 ENCOUNTER — OFFICE VISIT (OUTPATIENT)
Dept: CARDIOLOGY CLINIC | Age: 84
End: 2024-09-09
Payer: MEDICARE

## 2024-09-09 VITALS
WEIGHT: 187 LBS | BODY MASS INDEX: 24.67 KG/M2 | HEART RATE: 57 BPM | SYSTOLIC BLOOD PRESSURE: 144 MMHG | DIASTOLIC BLOOD PRESSURE: 76 MMHG

## 2024-09-09 DIAGNOSIS — R19.5 DARK STOOLS: ICD-10-CM

## 2024-09-09 DIAGNOSIS — I48.0 PAROXYSMAL ATRIAL FIBRILLATION (HCC): Primary | ICD-10-CM

## 2024-09-09 PROCEDURE — 1123F ACP DISCUSS/DSCN MKR DOCD: CPT | Performed by: INTERNAL MEDICINE

## 2024-09-09 PROCEDURE — G8420 CALC BMI NORM PARAMETERS: HCPCS | Performed by: INTERNAL MEDICINE

## 2024-09-09 PROCEDURE — 3078F DIAST BP <80 MM HG: CPT | Performed by: INTERNAL MEDICINE

## 2024-09-09 PROCEDURE — 93000 ELECTROCARDIOGRAM COMPLETE: CPT | Performed by: INTERNAL MEDICINE

## 2024-09-09 PROCEDURE — 99214 OFFICE O/P EST MOD 30 MIN: CPT | Performed by: INTERNAL MEDICINE

## 2024-09-09 PROCEDURE — 3077F SYST BP >= 140 MM HG: CPT | Performed by: INTERNAL MEDICINE

## 2024-09-09 PROCEDURE — G8427 DOCREV CUR MEDS BY ELIG CLIN: HCPCS | Performed by: INTERNAL MEDICINE

## 2024-09-09 PROCEDURE — 1036F TOBACCO NON-USER: CPT | Performed by: INTERNAL MEDICINE

## 2024-09-09 ASSESSMENT — ENCOUNTER SYMPTOMS
DIARRHEA: 0
CONSTIPATION: 0
SHORTNESS OF BREATH: 0
ABDOMINAL PAIN: 0
EYE PAIN: 0
NAUSEA: 0
ABDOMINAL DISTENTION: 0
EYE REDNESS: 0
APNEA: 0
EYE DISCHARGE: 0
WHEEZING: 0
CHEST TIGHTNESS: 0
COUGH: 0
VOMITING: 0
FACIAL SWELLING: 0
SORE THROAT: 0
BLOOD IN STOOL: 0

## 2024-09-12 DIAGNOSIS — I48.0 PAROXYSMAL ATRIAL FIBRILLATION (HCC): ICD-10-CM

## 2024-09-12 DIAGNOSIS — R19.5 DARK STOOLS: ICD-10-CM

## 2024-09-12 LAB
HEMOCCULT SP2 STL QL: NORMAL
HEMOCCULT SP3 STL QL: NORMAL
HEMOCCULT STL QL: NORMAL

## 2024-09-18 ENCOUNTER — TELEPHONE (OUTPATIENT)
Dept: HEMATOLOGY | Age: 84
End: 2024-09-18

## 2024-09-20 DIAGNOSIS — C83.18 MANTLE CELL LYMPHOMA OF LYMPH NODES OF MULTIPLE REGIONS (HCC): Primary | ICD-10-CM

## 2024-09-23 ENCOUNTER — OFFICE VISIT (OUTPATIENT)
Dept: HEMATOLOGY | Age: 84
End: 2024-09-23
Payer: MEDICARE

## 2024-09-23 ENCOUNTER — HOSPITAL ENCOUNTER (OUTPATIENT)
Dept: INFUSION THERAPY | Age: 84
Discharge: HOME | End: 2024-09-23
Payer: MEDICARE

## 2024-09-23 VITALS
WEIGHT: 181.9 LBS | BODY MASS INDEX: 24.11 KG/M2 | HEART RATE: 66 BPM | SYSTOLIC BLOOD PRESSURE: 138 MMHG | OXYGEN SATURATION: 97 % | DIASTOLIC BLOOD PRESSURE: 60 MMHG | HEIGHT: 73 IN | TEMPERATURE: 97.9 F

## 2024-09-23 DIAGNOSIS — Z85.72 ENCOUNTER FOR FOLLOW-UP SURVEILLANCE OF LYMPHOMA: ICD-10-CM

## 2024-09-23 DIAGNOSIS — R53.83 OTHER FATIGUE: ICD-10-CM

## 2024-09-23 DIAGNOSIS — E87.6 HYPOKALEMIA: ICD-10-CM

## 2024-09-23 DIAGNOSIS — D69.6 ACQUIRED THROMBOCYTOPENIA (HCC): ICD-10-CM

## 2024-09-23 DIAGNOSIS — N28.9 RENAL IMPAIRMENT: ICD-10-CM

## 2024-09-23 DIAGNOSIS — D64.9 NORMOCHROMIC ANEMIA: ICD-10-CM

## 2024-09-23 DIAGNOSIS — Z08 ENCOUNTER FOR FOLLOW-UP SURVEILLANCE OF LYMPHOMA: ICD-10-CM

## 2024-09-23 DIAGNOSIS — C83.18 MANTLE CELL LYMPHOMA OF LYMPH NODES OF MULTIPLE REGIONS (HCC): ICD-10-CM

## 2024-09-23 DIAGNOSIS — D64.9 NORMOCYTIC ANEMIA: ICD-10-CM

## 2024-09-23 DIAGNOSIS — Z71.89 CARE PLAN DISCUSSED WITH PATIENT: ICD-10-CM

## 2024-09-23 DIAGNOSIS — C83.18 MANTLE CELL LYMPHOMA OF LYMPH NODES OF MULTIPLE REGIONS (HCC): Primary | ICD-10-CM

## 2024-09-23 LAB
ALBUMIN SERPL-MCNC: 4.2 G/DL (ref 3.5–5.2)
ALP SERPL-CCNC: 148 U/L (ref 40–129)
ALT SERPL-CCNC: 34 U/L (ref 5–41)
ANION GAP SERPL CALCULATED.3IONS-SCNC: 10 MMOL/L (ref 7–19)
AST SERPL-CCNC: 32 U/L (ref 5–40)
BASOPHILS # BLD: 0.03 K/UL (ref 0.01–0.08)
BASOPHILS NFR BLD: 0.7 % (ref 0.1–1.2)
BILIRUB SERPL-MCNC: 0.6 MG/DL (ref 0–1.2)
BUN SERPL-MCNC: 18 MG/DL (ref 8–23)
CALCIUM SERPL-MCNC: 10.1 MG/DL (ref 8.8–10.2)
CHLORIDE SERPL-SCNC: 104 MMOL/L (ref 98–107)
CO2 SERPL-SCNC: 29 MMOL/L (ref 22–29)
CREAT SERPL-MCNC: 1.3 MG/DL (ref 0.7–1.2)
EOSINOPHIL # BLD: 0.12 K/UL (ref 0.04–0.54)
EOSINOPHIL NFR BLD: 2.6 % (ref 0.7–7)
ERYTHROCYTE [DISTWIDTH] IN BLOOD BY AUTOMATED COUNT: 13.9 % (ref 11.6–14.4)
GLUCOSE SERPL-MCNC: 115 MG/DL (ref 70–99)
HCT VFR BLD AUTO: 37.1 % (ref 40.1–51)
HGB BLD-MCNC: 12.7 G/DL (ref 13.7–17.5)
LDH SERPL-CCNC: 159 U/L (ref 135–225)
LYMPHOCYTES # BLD: 1.07 K/UL (ref 1.18–3.74)
LYMPHOCYTES NFR BLD: 23.5 % (ref 19.3–53.1)
MCH RBC QN AUTO: 32.5 PG (ref 25.7–32.2)
MCHC RBC AUTO-ENTMCNC: 34.2 G/DL (ref 32.3–36.5)
MCV RBC AUTO: 94.9 FL (ref 79–92.2)
MONOCYTES # BLD: 0.5 K/UL (ref 0.24–0.82)
MONOCYTES NFR BLD: 11 % (ref 4.7–12.5)
NEUTROPHILS # BLD: 2.81 K/UL (ref 1.56–6.13)
NEUTS SEG NFR BLD: 61.8 % (ref 34–71.1)
PLATELET # BLD AUTO: 142 K/UL (ref 163–337)
PMV BLD AUTO: 9.8 FL (ref 7.4–10.4)
POTASSIUM SERPL-SCNC: 3.4 MMOL/L (ref 3.5–5.1)
PROT SERPL-MCNC: 6.6 G/DL (ref 6.4–8.3)
RBC # BLD AUTO: 3.91 M/UL (ref 4.63–6.08)
SODIUM SERPL-SCNC: 143 MMOL/L (ref 136–145)
WBC # BLD AUTO: 4.55 K/UL (ref 4.23–9.07)

## 2024-09-23 PROCEDURE — 3078F DIAST BP <80 MM HG: CPT | Performed by: INTERNAL MEDICINE

## 2024-09-23 PROCEDURE — 80053 COMPREHEN METABOLIC PANEL: CPT

## 2024-09-23 PROCEDURE — 83615 LACTATE (LD) (LDH) ENZYME: CPT

## 2024-09-23 PROCEDURE — G8427 DOCREV CUR MEDS BY ELIG CLIN: HCPCS | Performed by: INTERNAL MEDICINE

## 2024-09-23 PROCEDURE — 3075F SYST BP GE 130 - 139MM HG: CPT | Performed by: INTERNAL MEDICINE

## 2024-09-23 PROCEDURE — 1036F TOBACCO NON-USER: CPT | Performed by: INTERNAL MEDICINE

## 2024-09-23 PROCEDURE — 1123F ACP DISCUSS/DSCN MKR DOCD: CPT | Performed by: INTERNAL MEDICINE

## 2024-09-23 PROCEDURE — 99213 OFFICE O/P EST LOW 20 MIN: CPT

## 2024-09-23 PROCEDURE — 85025 COMPLETE CBC W/AUTO DIFF WBC: CPT

## 2024-09-23 PROCEDURE — 99213 OFFICE O/P EST LOW 20 MIN: CPT | Performed by: INTERNAL MEDICINE

## 2024-09-23 PROCEDURE — G2211 COMPLEX E/M VISIT ADD ON: HCPCS | Performed by: INTERNAL MEDICINE

## 2024-09-23 PROCEDURE — 36415 COLL VENOUS BLD VENIPUNCTURE: CPT

## 2024-09-23 PROCEDURE — G8420 CALC BMI NORM PARAMETERS: HCPCS | Performed by: INTERNAL MEDICINE

## 2024-09-23 RX ORDER — SODIUM CHLORIDE 0.9 % (FLUSH) 0.9 %
5-40 SYRINGE (ML) INJECTION PRN
OUTPATIENT
Start: 2024-09-23

## 2024-09-23 RX ORDER — HEPARIN SODIUM (PORCINE) LOCK FLUSH IV SOLN 100 UNIT/ML 100 UNIT/ML
500 SOLUTION INTRAVENOUS PRN
OUTPATIENT
Start: 2024-09-23

## 2024-09-23 RX ORDER — SODIUM CHLORIDE 9 MG/ML
25 INJECTION, SOLUTION INTRAVENOUS PRN
OUTPATIENT
Start: 2024-09-23

## 2024-09-23 RX ORDER — WATER 10 ML/10ML
2.2 INJECTION INTRAMUSCULAR; INTRAVENOUS; SUBCUTANEOUS ONCE
OUTPATIENT
Start: 2024-09-23 | End: 2024-09-23

## 2024-10-16 RX ORDER — OLMESARTAN MEDOXOMIL 40 MG/1
40 TABLET ORAL DAILY
Qty: 90 TABLET | Refills: 1 | Status: SHIPPED | OUTPATIENT
Start: 2024-10-16

## 2024-11-12 ENCOUNTER — HOSPITAL ENCOUNTER (OUTPATIENT)
Dept: CT IMAGING | Facility: HOSPITAL | Age: 84
Discharge: HOME | End: 2024-11-12
Admitting: FAMILY MEDICINE
Payer: MEDICARE

## 2024-11-12 ENCOUNTER — TRANSCRIBE ORDERS (OUTPATIENT)
Dept: ADMINISTRATIVE | Facility: HOSPITAL | Age: 84
End: 2024-11-12
Payer: MEDICARE

## 2024-11-12 DIAGNOSIS — S09.90XA CLOSED HEAD INJURY, INITIAL ENCOUNTER: ICD-10-CM

## 2024-11-12 DIAGNOSIS — S09.90XA CLOSED HEAD INJURY, INITIAL ENCOUNTER: Primary | ICD-10-CM

## 2024-11-12 PROCEDURE — 70450 CT HEAD/BRAIN W/O DYE: CPT

## (undated) DEVICE — SEAL

## (undated) DEVICE — GLV SURG BIOGEL M LTX PF 7 1/2

## (undated) DEVICE — TROCAR: Brand: KII FIOS FIRST ENTRY

## (undated) DEVICE — COVER,MAYO STAND,STERILE: Brand: MEDLINE

## (undated) DEVICE — SUT ETHLN 6/0 P3 18IN 1698G

## (undated) DEVICE — RELOAD STPL SZ 0 L45MM DIA3.5MM 0DEG STD REG TISS BLU TI

## (undated) DEVICE — SUT ETHLN 5/0 P3 18IN 698H

## (undated) DEVICE — GOWN,PREVENTION PLUS,XL,ST,24/CS: Brand: MEDLINE

## (undated) DEVICE — LARYNGOSCOPE BLDE MAC HNDL M SZ 35 ST CURAPLEX CURAVIEW LED

## (undated) DEVICE — BAG SPEC REM 224ML W4XL6IN DIA10MM 1 HND GYN DISP ENDOPCH

## (undated) DEVICE — LIQUIBAND RAPID ADHESIVE 36/CS 0.8ML: Brand: MEDLINE

## (undated) DEVICE — REDUCER: Brand: ENDOWRIST

## (undated) DEVICE — SOLUTION ANTIFOG VIS SYS CLEARIFY LAPSCP

## (undated) DEVICE — SUT MNCRYL 4/0 P3 18IN UD MCP494G

## (undated) DEVICE — PUMP SUC IRR TBNG L10FT W/ HNDPC ASSEMB STRYKEFLOW 2

## (undated) DEVICE — INSUFFLATION NEEDLE TO ESTABLISH PNEUMOPERITONEUM.: Brand: INSUFFLATION NEEDLE

## (undated) DEVICE — CANNULA SEAL

## (undated) DEVICE — BLADELESS OBTURATOR: Brand: WECK VISTA

## (undated) DEVICE — GENERAL LAP CDS

## (undated) DEVICE — PK ENT HD AND NK 30

## (undated) DEVICE — GLOVE SURG SZ 7 CRM LTX FREE POLYISOPRENE POLYMER BEAD ANTI

## (undated) DEVICE — SPNG GZ WOVN 4X4IN 12PLY 10/BX STRL

## (undated) DEVICE — GLOVE SURG SZ 85 L12IN FNGR THK79MIL GRN LTX FREE

## (undated) DEVICE — BAPTIST TURNOVER KIT: Brand: MEDLINE INDUSTRIES, INC.

## (undated) DEVICE — ARM DRAPE

## (undated) DEVICE — SUTURE VCRL SZ 0 L27IN ABSRB VLT L36MM UR-5 5/8 CIR J376H

## (undated) DEVICE — CUTTER ENDOSCP L340MM LIN ARTC SGL STROKE FIRING ENDOPATH

## (undated) DEVICE — COVER LT HNDL BLU PLAS

## (undated) DEVICE — APPLIER CLP M/L SHFT DIA5MM 15 LIG LIGAMAX 5

## (undated) DEVICE — TUBE ET 7.5MM NSL ORAL BASIC CUF INTMED MURPHY EYE RADPQ

## (undated) DEVICE — SUTURE MCRYL SZ 4-0 L18IN ABSRB UD L19MM PS-2 3/8 CIR PRIM Y496G

## (undated) DEVICE — PREP SOL POVIDONE/IODINE BT 4OZ